# Patient Record
Sex: FEMALE | Race: WHITE | Employment: FULL TIME | URBAN - METROPOLITAN AREA
[De-identification: names, ages, dates, MRNs, and addresses within clinical notes are randomized per-mention and may not be internally consistent; named-entity substitution may affect disease eponyms.]

---

## 2017-01-17 ENCOUNTER — MYC MEDICAL ADVICE (OUTPATIENT)
Dept: FAMILY MEDICINE | Facility: CLINIC | Age: 47
End: 2017-01-17

## 2017-02-15 ENCOUNTER — OFFICE VISIT (OUTPATIENT)
Dept: FAMILY MEDICINE | Facility: CLINIC | Age: 47
End: 2017-02-15
Payer: COMMERCIAL

## 2017-02-15 VITALS
BODY MASS INDEX: 31.5 KG/M2 | HEIGHT: 66 IN | SYSTOLIC BLOOD PRESSURE: 122 MMHG | OXYGEN SATURATION: 98 % | DIASTOLIC BLOOD PRESSURE: 82 MMHG | HEART RATE: 96 BPM | TEMPERATURE: 98.6 F | WEIGHT: 196 LBS

## 2017-02-15 DIAGNOSIS — R52 BODY ACHES: ICD-10-CM

## 2017-02-15 DIAGNOSIS — R50.9 FEVER, UNSPECIFIED: Primary | ICD-10-CM

## 2017-02-15 DIAGNOSIS — R05.9 COUGH: ICD-10-CM

## 2017-02-15 DIAGNOSIS — R09.81 NASAL CONGESTION: ICD-10-CM

## 2017-02-15 LAB
FLUAV+FLUBV AG SPEC QL: NORMAL
FLUAV+FLUBV AG SPEC QL: NORMAL
SPECIMEN SOURCE: NORMAL

## 2017-02-15 PROCEDURE — 87804 INFLUENZA ASSAY W/OPTIC: CPT | Performed by: PHYSICIAN ASSISTANT

## 2017-02-15 PROCEDURE — 99213 OFFICE O/P EST LOW 20 MIN: CPT | Performed by: PHYSICIAN ASSISTANT

## 2017-02-15 NOTE — NURSING NOTE
Chief Complaint   Patient presents with     Otalgia     Generalized Body Aches     Fever     Cough    There were no vitals taken for this visit. Body Mass Index is There is no height or weight on file to calculate BMI.  BP completed using cuff size : large right arm  Natali Ortega MA

## 2017-02-15 NOTE — LETTER
[facility]  [address]  [facility] [address]  Phone: [phone]  Fax: [phone]    February 15, 2017        Milagro Frye  [address]   [facility] [address]          To whom it may concern:    RE: Milagro Frye    Patient was seen and treated today at our clinic. Please excuse from until afebrile for 24 hours not on fever reducers.    Please contact me for questions or concerns.      Sincerely,        Darline Anthony PA-C

## 2017-02-15 NOTE — MR AVS SNAPSHOT
After Visit Summary   2/15/2017    Milagro Frye    MRN: [id number]           Patient Information     Date Of Birth          1970        Visit Information        Provider Department      2/15/2017 3:40 PM Darline Anthony PA-C [facility]        Today's Diagnoses     Fever, unspecified    -  1    Body aches        Nasal congestion        Cough          Care Instructions    Neg for influenza.  Hx/exam still suggest this can be the case despite negative test.  Reviewed natural progression, potential complications, hygiene measures and public health risks.  Note provided for work.  Treatment is supportive.  Re-check if not improving as expected.    Electronically Signed By: Darline Anthony PA-C          Follow-ups after your visit        Who to contact     If you have questions or need follow up information about today's clinic visit or your schedule please contact [facility] directly at [phone].  Normal or non-critical lab and imaging results will be communicated to you by independenceIThart, letter or phone within 4 business days after the clinic has received the results. If you do not hear from us within 7 days, please contact the clinic through independenceIThart or phone. If you have a critical or abnormal lab result, we will notify you by phone as soon as possible.  Submit refill requests through "[employer]" or call your pharmacy and they will forward the refill request to us. Please allow 3 business days for your refill to be completed.          Additional Information About Your Visit        MyChart Information     "[employer]" gives you secure access to your electronic health record. If you see a primary care provider, you can also send messages to your care team and make appointments. If you have questions, please call your primary care clinic.  If you do not have a primary care provider, please call [phone] and they will assist you.        Care EveryWhere ID     This  "is your Care EveryWhere ID. This could be used by other organizations to access your [facility] medical records  [id number]        Your Vitals Were     Pulse Temperature Height Pulse Oximetry Breastfeeding? BMI (Body Mass Index)    96 98.6  F (37  C) (Oral) 5' 6\" (1.676 m) 98% No 31.64 kg/m2       Blood Pressure from Last 3 Encounters:   02/15/17 122/82   11/06/16 125/71   09/29/16 120/72    Weight from Last 3 Encounters:   02/15/17 196 lb (88.9 kg)   09/29/16 194 lb (88 kg)   04/06/16 199 lb (90.3 kg)              We Performed the Following     Influenza A/B antigen        Primary Care Provider    [facility]       No address on file        Thank you!     Thank you for choosing [facility]  for your care. Our goal is always to provide you with excellent care. Hearing back from our patients is one way we can continue to improve our services. Please take a few minutes to complete the written survey that you may receive in the mail after your visit with us. Thank you!             Your Updated Medication List - Protect others around you: Learn how to safely use, store and throw away your medicines at www.disposemymeds.org.          This list is accurate as of: 2/15/17  4:21 PM.  Always use your most recent med list.                   Brand Name Dispense Instructions for use    cetirizine 10 MG tablet    zyrTEC    30 tablet    Take 1 tablet (10 mg) by mouth every evening       DULoxetine 60 MG EC capsule    CYMBALTA    90 capsule    Take 1 capsule (60 mg) by mouth daily       GLUCOSAMINE SULFATE PO          HYDROcodone-acetaminophen 5-325 MG per tablet    NORCO    20 tablet    Take 1 tablet by mouth every 6 hours as needed for moderate to severe pain       meclizine 25 MG tablet    ANTIVERT    30 tablet    Take 1 tablet (25 mg) by mouth every 6 hours as needed for dizziness       nitroglycerin 0.1 MG/HR 24 hr patch    NITRODUR         ondansetron 8 MG ODT tab    ZOFRAN ODT    20 tablet    Take " 1 tablet (8 mg) by mouth every 8 hours as needed for nausea       tobramycin 0.3 % Oint ophthalmic ointment    TOBREX    3.5 g    Apply 1/2 inch ribbon of ointment       TYLENOL PO

## 2017-02-15 NOTE — PATIENT INSTRUCTIONS
Neg for influenza.  Hx/exam still suggest this can be the case despite negative test.  Reviewed natural progression, potential complications, hygiene measures and public health risks.  Note provided for work.  Treatment is supportive.  Re-check if not improving as expected.    Electronically Signed By: Darline Anthony PA-C

## 2017-02-15 NOTE — PROGRESS NOTES
SUBJECTIVE:                                                    Milagro Frye is a 46 year old female who presents to clinic today for the following health issues:      Acute Illness   Acute illness concerns: fever, headache, sore throat, cough.  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    Onset: started Sunday morning, but full blown awful by 3pm with body aches and respiratory sx.  Received flu vaccine this year.      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     Therapies Tried and outcome: tylenol and mucinex, taken last at 2    Problem list and histories reviewed & adjusted, as indicated.  Additional history: as documented  Problem list, Medication list, Allergies, and Medical/Social/Surgical histories reviewed in [facility] and updated as appropriate.    Current Outpatient Prescriptions   Medication     tobramycin (TOBREX) 0.3 % OINT ophthalmic ointment     DULoxetine (CYMBALTA) 60 MG capsule     meclizine (ANTIVERT) 25 MG tablet     ondansetron (ZOFRAN ODT) 8 MG disintegrating tablet     HYDROcodone-acetaminophen (NORCO) 5-325 MG per tablet     nitroglycerin (NITRODUR) 0.1 MG/HR     cetirizine (ZYRTEC) 10 MG tablet     GLUCOSAMINE SULFATE PO     Acetaminophen (TYLENOL PO)     No current facility-administered medications for this visit.        Allergies   Allergen Reactions     Sporanox [Imidazole Antifungals] Hives     Aspartame      Headaches and occasional rash     Bee Venom       "Coconut Oil      NOTE. Only allergic to meat of fruit.     Indomethacin      personality changes     Meperidine Hcl      Metronidazole      severe headaches     ROS:  Constitutional, HEENT, cardiovascular, pulmonary, gi and gu systems are negative, except as otherwise noted.    OBJECTIVE:                                                    /82 (BP Location: Right arm, Cuff Size: Adult Large)  Pulse 96  Temp 98.6  F (37  C) (Oral)  Ht 5' 6\" (1.676 m)  Wt 196 lb (88.9 kg)  SpO2 98%  Breastfeeding? No  BMI 31.64 kg/m2  Body mass index is 31.64 kg/(m^2).  GENERAL: healthy, alert and no distress  EYES: Eyes grossly normal to inspection, PERRL and conjunctivae and sclerae normal  HENT: ear canals and TM's normal, nose and mouth without ulcers or lesions  NECK: no adenopathy, no asymmetry, masses, or scars and thyroid normal to palpation  RESP: lungs clear to auscultation - no rales, rhonchi or wheezes  CV: regular rate and rhythm, normal S1 S2, no S3 or S4, no murmur, click or rub, no peripheral edema and peripheral pulses strong.    Diagnostic Test Results:  Results for orders placed or performed in visit on 02/15/17 (from the past 24 hour(s))   Influenza A/B antigen   Result Value Ref Range    Influenza A/B Agn Specimen Nasal     Influenza A  NEG     Negative   Test results must be correlated with clinical data. If necessary, results   should be confirmed by a molecular assay or viral culture.      Influenza B  NEG     Negative   Test results must be correlated with clinical data. If necessary, results   should be confirmed by a molecular assay or viral culture.          ASSESSMENT/PLAN:                                                        ICD-10-CM    1. Fever, unspecified R50.9 Influenza A/B antigen   2. Body aches R52    3. Nasal congestion R09.81    4. Cough R05    Outside treatment range for tamiflu.  See Patient Instructions  Patient Instructions   Neg for influenza.  Hx/exam still suggest this can be the " case despite negative test.  Reviewed natural progression, potential complications, hygiene measures and public health risks.  Note provided for work.  Treatment is supportive.  Re-check if not improving as expected.    Electronically Signed By: Darline Anthony PA-C

## 2017-06-01 ENCOUNTER — MYC MEDICAL ADVICE (OUTPATIENT)
Dept: FAMILY MEDICINE | Facility: CLINIC | Age: 47
End: 2017-06-01

## 2017-06-01 DIAGNOSIS — F33.1 MAJOR DEPRESSIVE DISORDER, RECURRENT EPISODE, MODERATE (H): ICD-10-CM

## 2017-06-01 DIAGNOSIS — F33.1 MAJOR DEPRESSIVE DISORDER, RECURRENT EPISODE, MODERATE (H): Primary | ICD-10-CM

## 2017-06-01 ASSESSMENT — ANXIETY QUESTIONNAIRES
4. TROUBLE RELAXING: SEVERAL DAYS
1. FEELING NERVOUS, ANXIOUS, OR ON EDGE: SEVERAL DAYS
GAD7 TOTAL SCORE: 8
3. WORRYING TOO MUCH ABOUT DIFFERENT THINGS: MORE THAN HALF THE DAYS
7. FEELING AFRAID AS IF SOMETHING AWFUL MIGHT HAPPEN: SEVERAL DAYS
5. BEING SO RESTLESS THAT IT IS HARD TO SIT STILL: NOT AT ALL
2. NOT BEING ABLE TO STOP OR CONTROL WORRYING: MORE THAN HALF THE DAYS
7. FEELING AFRAID AS IF SOMETHING AWFUL MIGHT HAPPEN: SEVERAL DAYS
6. BECOMING EASILY ANNOYED OR IRRITABLE: SEVERAL DAYS
GAD7 TOTAL SCORE: 0
GAD7 TOTAL SCORE: 0

## 2017-06-01 ASSESSMENT — PATIENT HEALTH QUESTIONNAIRE - PHQ9
10. IF YOU CHECKED OFF ANY PROBLEMS, HOW DIFFICULT HAVE THESE PROBLEMS MADE IT FOR YOU TO DO YOUR WORK, TAKE CARE OF THINGS AT HOME, OR GET ALONG WITH OTHER PEOPLE: SOMEWHAT DIFFICULT
SUM OF ALL RESPONSES TO PHQ QUESTIONS 1-9: 10
SUM OF ALL RESPONSES TO PHQ QUESTIONS 1-9: 10

## 2017-06-01 NOTE — TELEPHONE ENCOUNTER
DULoxetine (CYMBALTA) 60 MG capsule  Last Written Prescription Date: 10/15/2016  Last Fill Quantity: 90, # refills: 1  Last Office Visit with [facility] primary care provider:  2/15/2017        Last PHQ-9 score on record=   PHQ-9 SCORE 9/9/2016   Total Score -   Total Score 8       Due for a phq-9 and viraj 7   My chart message sent     Michelle Saez RN, BSN  [place]

## 2017-06-02 RX ORDER — DULOXETIN HYDROCHLORIDE 60 MG/1
CAPSULE, DELAYED RELEASE ORAL
Qty: 90 CAPSULE | Refills: 0 | Status: SHIPPED | OUTPATIENT
Start: 2017-06-02 | End: 2017-09-06

## 2017-06-02 RX ORDER — DULOXETIN HYDROCHLORIDE 30 MG/1
30 CAPSULE, DELAYED RELEASE ORAL DAILY
Qty: 30 CAPSULE | Refills: 1 | Status: SHIPPED | OUTPATIENT
Start: 2017-06-02 | End: 2017-09-06

## 2017-06-02 ASSESSMENT — PATIENT HEALTH QUESTIONNAIRE - PHQ9: SUM OF ALL RESPONSES TO PHQ QUESTIONS 1-9: 10

## 2017-06-02 NOTE — TELEPHONE ENCOUNTER
Please see refill request and my chart message below     Please advise     Thank you     Michelle Saez RN, BSN  [facility]

## 2017-06-02 NOTE — TELEPHONE ENCOUNTER
PHQ-9 SCORE 1/6/2016 9/9/2016 6/1/2017   Total Score - - -   Total Score MyChart - - 10 (Moderate depression)   Total Score 7 8 10     SELENA-7 SCORE 5/22/2015 1/6/2016 6/1/2017   Total Score 5 - -   Total Score - - 0 (minimal anxiety)   Total Score - 6 8       Please review and advise on refill     Thank you     Michelle Saez RN, BSN  [place] [facility]

## 2017-07-15 ENCOUNTER — OFFICE VISIT (OUTPATIENT)
Dept: URGENT CARE | Facility: URGENT CARE | Age: 47
End: 2017-07-15
Payer: OTHER MISCELLANEOUS

## 2017-07-15 VITALS
OXYGEN SATURATION: 97 % | HEART RATE: 77 BPM | BODY MASS INDEX: 31.64 KG/M2 | WEIGHT: 196 LBS | TEMPERATURE: 98 F | DIASTOLIC BLOOD PRESSURE: 70 MMHG | SYSTOLIC BLOOD PRESSURE: 110 MMHG

## 2017-07-15 DIAGNOSIS — M54.6 ACUTE MIDLINE THORACIC BACK PAIN: Primary | ICD-10-CM

## 2017-07-15 PROCEDURE — 99213 OFFICE O/P EST LOW 20 MIN: CPT | Performed by: NURSE PRACTITIONER

## 2017-07-15 RX ORDER — CYCLOBENZAPRINE HCL 10 MG
10 TABLET ORAL
Qty: 14 TABLET | Refills: 0 | Status: SHIPPED | OUTPATIENT
Start: 2017-07-15 | End: 2017-09-06

## 2017-07-15 NOTE — NURSING NOTE
"Milagro Frye is a 46 year old female.      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    Weight as of this encounter: 196 lb (88.9 kg).  Medication Reconciliation: complete      Questioned patient about current smoking habits.  Pt. has never smoked.      Tammie Varghese CMA      "

## 2017-07-15 NOTE — LETTER
[facility]  [address]  [facility] [address]  [phone]    July 15, 2017    Milagro Frye  [address]   [facility] [address]  [phone] (home) none (work)    : 1970      To Whom it may concern:    Milagro Frye was seen in Urgent care  today, July 15, 2017.  I expect her condition to improve over the next few days.  She may return to work next scheduled shift if she is feeling better.  Follow up with primary care provider as needed.     Sincerely,          Sulma HAMLIN

## 2017-07-15 NOTE — PROGRESS NOTES
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 She denies bowel and bladder problem no weakness or numbness of upper or lower  limbs. The pain is positional with lifting, without radiation down the skip or  legs. Prior history of back problems: recurrent self limited episodes of low back pain in the past.     OBJECTIVE:  Vital signs as noted above./70 (BP Location: Right arm, Cuff Size: Adult Large)  Pulse 77  Temp 98  F (36.7  C) (Oral)  Wt 196 lb (88.9 kg)  SpO2 97%  BMI 31.64 kg/m2  Patient appears well in no acute distress. There is mild paraspinal tenderness with palpation low thoracic spine  No antalgic gait noted.  Spine reveals no local tenderness or mass. Straight leg raise is negative at 90 degrees  bilaterally. DTR's, motor strength and sensation normal, including heel and toe gait.      ASSESSMENT:   Mid and low back pain vs spasm vs strain     PLAN:Rx Flexeril OTC Ibuprofen. No focal neurological finding on exam. For acute pain, rest, intermittent application of cold.  Proper lifting with avoidance of heavy lifting discussed. She is off the work for the remaining of weekend. Return  if these symptoms worsen f/u with primary care provider. Given RTW letter.

## 2017-07-15 NOTE — MR AVS SNAPSHOT
After Visit Summary   7/15/2017    Milagro Frye    MRN: [id number]           Patient Information     Date Of Birth          1970        Visit Information        Provider Department      7/15/2017 2:30 PM Sulma Henderson APRN CNP [facility]        Today's Diagnoses     Acute midline thoracic back pain    -  1       Follow-ups after your visit        Who to contact     If you have questions or need follow up information about today's clinic visit or your schedule please contact [facility] directly at [phone].  Normal or non-critical lab and imaging results will be communicated to you by Doc[employer], letter or phone within 4 business days after the clinic has received the results. If you do not hear from us within 7 days, please contact the clinic through [employer] or phone. If you have a critical or abnormal lab result, we will notify you by phone as soon as possible.  Submit refill requests through [employer] or call your pharmacy and they will forward the refill request to us. Please allow 3 business days for your refill to be completed.          Additional Information About Your Visit        MyChart Information     [employer] gives you secure access to your electronic health record. If you see a primary care provider, you can also send messages to your care team and make appointments. If you have questions, please call your primary care clinic.  If you do not have a primary care provider, please call [phone] and they will assist you.        Care EveryWhere ID     This is your Care EveryWhere ID. This could be used by other organizations to access your [facility] medical records  [id number]        Your Vitals Were     Pulse Temperature Pulse Oximetry BMI (Body Mass Index)          77 98  F (36.7  C) (Oral) 97% 31.64 kg/m2         Blood Pressure from Last 3 Encounters:   07/15/17 110/70   02/15/17 122/82   11/06/16 125/71    Weight from Last 3  Encounters:   07/15/17 196 lb (88.9 kg)   02/15/17 196 lb (88.9 kg)   09/29/16 194 lb (88 kg)              Today, you had the following     No orders found for display         Today's Medication Changes          These changes are accurate as of: 7/15/17  4:50 PM.  If you have any questions, ask your nurse or doctor.               Start taking these medicines.        Dose/Directions    cyclobenzaprine 10 MG tablet   Commonly known as:  FLEXERIL   Used for:  Acute midline thoracic back pain   Started by:  uSlma Henderson APRN CNP        Dose:  10 mg   Take 1 tablet (10 mg) by mouth nightly as needed for muscle spasms   Quantity:  14 tablet   Refills:  0            Where to get your medicines      These medications were sent to [facility] [address] [address] AT [facility] [address] [place] [address], [facility] [address]    Hours:  24-hours Phone:  [phone]     cyclobenzaprine 10 MG tablet                Primary Care Provider    [facility]       No address on file        Equal Access to Services     HARVEY VENEGAS AH: Hadii micah virgen hadasho Sojarett, waaxda luqadaha, qaybta kaalmada adeegyalauren, rj guillaume. So [facility] [phone].    ATENCIÓN: Si habla español, tiene a higgins disposición servicios gratuitos de asistencia lingüística. Llame al [phone].    We comply with applicable federal civil rights laws and [place] laws. We do not discriminate on the basis of race, color, national origin, age, disability sex, sexual orientation or gender identity.            Thank you!     Thank you for choosing [facility]  for your care. Our goal is always to provide you with excellent care. Hearing back from our patients is one way we can continue to improve our services. Please take a few minutes to complete the written survey that you may receive in the mail after your visit with us. Thank you!             Your Updated  Medication List - Protect others around you: Learn how to safely use, store and throw away your medicines at www.disposemymeds.org.          This list is accurate as of: 7/15/17  4:50 PM.  Always use your most recent med list.                   Brand Name Dispense Instructions for use Diagnosis    cetirizine 10 MG tablet    zyrTEC    30 tablet    Take 1 tablet (10 mg) by mouth every evening        cyclobenzaprine 10 MG tablet    FLEXERIL    14 tablet    Take 1 tablet (10 mg) by mouth nightly as needed for muscle spasms    Acute midline thoracic back pain       * DULoxetine 60 MG EC capsule    CYMBALTA    90 capsule    TAKE ONE CAPSULE BY MOUTH DAILY    Major depressive disorder, recurrent episode, moderate (H)       * DULoxetine 30 MG EC capsule    CYMBALTA    30 capsule    Take 1 capsule (30 mg) by mouth daily    Major depressive disorder, recurrent episode, moderate (H)       HYDROcodone-acetaminophen 5-325 MG per tablet    NORCO    20 tablet    Take 1 tablet by mouth every 6 hours as needed for moderate to severe pain    Flank pain       meclizine 25 MG tablet    ANTIVERT    30 tablet    Take 1 tablet (25 mg) by mouth every 6 hours as needed for dizziness    BPPV (benign paroxysmal positional vertigo), left       nitroGLYcerin 0.1 MG/HR 24 hr patch    NITRODUR          ondansetron 8 MG ODT tab    ZOFRAN ODT    20 tablet    Take 1 tablet (8 mg) by mouth every 8 hours as needed for nausea    BPPV (benign paroxysmal positional vertigo), left       TYLENOL PO           * Notice:  This list has 2 medication(s) that are the same as other medications prescribed for you. Read the directions carefully, and ask your doctor or other care provider to review them with you.

## 2017-07-18 ENCOUNTER — OFFICE VISIT (OUTPATIENT)
Dept: FAMILY MEDICINE | Facility: CLINIC | Age: 47
End: 2017-07-18
Payer: OTHER MISCELLANEOUS

## 2017-07-18 VITALS
HEART RATE: 96 BPM | HEIGHT: 66 IN | OXYGEN SATURATION: 99 % | SYSTOLIC BLOOD PRESSURE: 108 MMHG | DIASTOLIC BLOOD PRESSURE: 62 MMHG | BODY MASS INDEX: 31.57 KG/M2 | TEMPERATURE: 98 F | WEIGHT: 196.4 LBS

## 2017-07-18 DIAGNOSIS — M54.50 RIGHT-SIDED LOW BACK PAIN WITHOUT SCIATICA, UNSPECIFIED CHRONICITY: Primary | ICD-10-CM

## 2017-07-18 DIAGNOSIS — Z02.6 ENCOUNTER RELATED TO WORKER'S COMPENSATION CLAIM: ICD-10-CM

## 2017-07-18 PROCEDURE — 99213 OFFICE O/P EST LOW 20 MIN: CPT | Performed by: PHYSICIAN ASSISTANT

## 2017-07-18 RX ORDER — CYCLOBENZAPRINE HCL 10 MG
5-10 TABLET ORAL 3 TIMES DAILY PRN
Qty: 30 TABLET | Refills: 1 | Status: SHIPPED | OUTPATIENT
Start: 2017-07-18 | End: 2017-07-31

## 2017-07-18 NOTE — MR AVS SNAPSHOT
"              After Visit Summary   7/18/2017    Milagro Frye    MRN: [id number]           Patient Information     Date Of Birth          1970        Visit Information        Provider Department      7/18/2017 9:40 AM Bridgett Barry PA-C [facility]        Today's Diagnoses     Right-sided low back pain without sciatica, unspecified chronicity    -  1    Encounter related to worker's compensation claim           Follow-ups after your visit        Who to contact     If you have questions or need follow up information about today's clinic visit or your schedule please contact [facility] [place] directly at [phone].  Normal or non-critical lab and imaging results will be communicated to you by [employer], letter or phone within 4 business days after the clinic has received the results. If you do not hear from us within 7 days, please contact the clinic through [employer] or phone. If you have a critical or abnormal lab result, we will notify you by phone as soon as possible.  Submit refill requests through [email or web address] or call your pharmacy and they will forward the refill request to us. Please allow 3 business days for your refill to be completed.          Additional Information About Your Visit        MyChart Information     [email or web address] gives you secure access to your electronic health record. If you see a primary care provider, you can also send messages to your care team and make appointments. If you have questions, please call your primary care clinic.  If you do not have a primary care provider, please call [phone] and they will assist you.        Care EveryWhere ID     This is your Care EveryWhere ID. This could be used by other organizations to access your [place] medical records  [id number]        Your Vitals Were     Pulse Temperature Height Pulse Oximetry BMI (Body Mass Index)       96 98  F (36.7  C) (Oral) 5' 6\" (1.676 m) 99% 31.7 kg/m2        Blood Pressure from Last 3 " Encounters:   07/18/17 108/62   07/15/17 110/70   02/15/17 122/82    Weight from Last 3 Encounters:   07/18/17 196 lb 6.4 oz (89.1 kg)   07/15/17 196 lb (88.9 kg)   02/15/17 196 lb (88.9 kg)              Today, you had the following     No orders found for display         Today's Medication Changes          These changes are accurate as of: 7/18/17 12:54 PM.  If you have any questions, ask your nurse or doctor.               These medicines have changed or have updated prescriptions.        Dose/Directions    * cyclobenzaprine 10 MG tablet   Commonly known as:  FLEXERIL   This may have changed:  Another medication with the same name was added. Make sure you understand how and when to take each.   Used for:  Acute midline thoracic back pain        Dose:  10 mg   Take 1 tablet (10 mg) by mouth nightly as needed for muscle spasms   Quantity:  14 tablet   Refills:  0       * cyclobenzaprine 10 MG tablet   Commonly known as:  FLEXERIL   This may have changed:  You were already taking a medication with the same name, and this prescription was added. Make sure you understand how and when to take each.   Used for:  Right-sided low back pain without sciatica, unspecified chronicity        Dose:  5-10 mg   Take 0.5-1 tablets (5-10 mg) by mouth 3 times daily as needed for muscle spasms   Quantity:  30 tablet   Refills:  1       * Notice:  This list has 2 medication(s) that are the same as other medications prescribed for you. Read the directions carefully, and ask your doctor or other care provider to review them with you.         Where to get your medicines      These medications were sent to [facility] [id number]  [place], [place] - [address], [facility] [place], [place] [address]    Hours:  24-hours Phone:  [phone]     cyclobenzaprine 10 MG tablet                Primary Care Provider    [facility]       No address on file        Equal Access to Services      [facility]: Hadii aad ku rico Sogerardali, waaxda luqadaha, qaybta kaalmada adeegrg, rj mariein hayaaashlie ramoswilma moses matteo . So [facility] [phone].    ATENCIÓN: Si habla abilio, tiene a higgins disposición servicios gratuitos de asistencia lingüística. Llame al [phone].    We comply with applicable federal civil rights laws and [place] laws. We do not discriminate on the basis of race, color, national origin, age, disability sex, sexual orientation or gender identity.            Thank you!     Thank you for choosing [facility]  for your care. Our goal is always to provide you with excellent care. Hearing back from our patients is one way we can continue to improve our services. Please take a few minutes to complete the written survey that you may receive in the mail after your visit with us. Thank you!             Your Updated Medication List - Protect others around you: Learn how to safely use, store and throw away your medicines at www.disposemymeds.org.          This list is accurate as of: 7/18/17 12:54 PM.  Always use your most recent med list.                   Brand Name Dispense Instructions for use Diagnosis    cetirizine 10 MG tablet    zyrTEC    30 tablet    Take 1 tablet (10 mg) by mouth every evening        * cyclobenzaprine 10 MG tablet    FLEXERIL    14 tablet    Take 1 tablet (10 mg) by mouth nightly as needed for muscle spasms    Acute midline thoracic back pain       * cyclobenzaprine 10 MG tablet    FLEXERIL    30 tablet    Take 0.5-1 tablets (5-10 mg) by mouth 3 times daily as needed for muscle spasms    Right-sided low back pain without sciatica, unspecified chronicity       * DULoxetine 60 MG EC capsule    CYMBALTA    90 capsule    TAKE ONE CAPSULE BY MOUTH DAILY    Major depressive disorder, recurrent episode, moderate (H)       * DULoxetine 30 MG EC capsule    CYMBALTA    30 capsule    Take 1 capsule (30 mg) by mouth daily    Major depressive disorder, recurrent  episode, moderate (H)       HYDROcodone-acetaminophen 5-325 MG per tablet    NORCO    20 tablet    Take 1 tablet by mouth every 6 hours as needed for moderate to severe pain    Flank pain       meclizine 25 MG tablet    ANTIVERT    30 tablet    Take 1 tablet (25 mg) by mouth every 6 hours as needed for dizziness    BPPV (benign paroxysmal positional vertigo), left       nitroGLYcerin 0.1 MG/HR 24 hr patch    NITRODUR          ondansetron 8 MG ODT tab    ZOFRAN ODT    20 tablet    Take 1 tablet (8 mg) by mouth every 8 hours as needed for nausea    BPPV (benign paroxysmal positional vertigo), left       TYLENOL PO           * Notice:  This list has 4 medication(s) that are the same as other medications prescribed for you. Read the directions carefully, and ask your doctor or other care provider to review them with you.

## 2017-07-18 NOTE — LETTER
[facility]  [address]  [facility] [address] [phone]    REPORT OF WORK ABILITY    NOTE TO EMPLOYEE: You must promptly provide a copy of this report to your  employer or worker's compensation insurer, and Qualified Rehabilitation Consultant.    Date: 7/18/2017                     Employee Name: Milagro Frye         YOB: 1970  Medical Record Number: [id number]   Soc.Sec.No: [id number]  Employer: [employer]                 Date of Injury: 7/15/17    Diagnosis: Acute midline thoracic back pain  Work Related: yes    Permanent Partial Disability(PPD) likely: NO    EMPLOYEE IS ABLE TO WORK: with restrictions from 7/19/17 to 7/23/17 -  Full shift     RESTRICTIONS IF ANY:   Stand: Occasionally (2-4 hours)   Sit: Occasionally (2-4 hours)   Walk: Occasionally (2-4 hours)   Kneel/[place]: Not at all (0 hours)      Lift, carry no more than:  10 - 20 lb. Occasionally (2-4 hours)   May use hands repetitively for:    Simple grasping: yes      Pushing/pulling: yes    Fine manipulation:  yes    Firm grasping:  yes     Operate Foot controls:  Right foot: not applicable   Left foot: not applicable     Bend:  Not at all (0 hours)     Stoop: Not at all (0 hours)    Twist: Not at all (0 hours)     Reach Above Shoulders: Occasionally (2-4 hours)    OTHER RESTRICTIONS: None    TREATMENT PLAN/NOTES: change work position for comfort.    If work restrictions cannot be accommodated, patient is unable to work.    Bridgett Barry PA-C
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
## 2017-07-21 ENCOUNTER — OFFICE VISIT (OUTPATIENT)
Dept: FAMILY MEDICINE | Facility: CLINIC | Age: 47
End: 2017-07-21
Payer: COMMERCIAL

## 2017-07-21 ENCOUNTER — TELEPHONE (OUTPATIENT)
Dept: FAMILY MEDICINE | Facility: CLINIC | Age: 47
End: 2017-07-21

## 2017-07-21 VITALS
SYSTOLIC BLOOD PRESSURE: 122 MMHG | TEMPERATURE: 98.4 F | WEIGHT: 195.8 LBS | OXYGEN SATURATION: 97 % | HEART RATE: 103 BPM | BODY MASS INDEX: 31.47 KG/M2 | DIASTOLIC BLOOD PRESSURE: 72 MMHG | HEIGHT: 66 IN

## 2017-07-21 DIAGNOSIS — M54.50 ACUTE LEFT-SIDED LOW BACK PAIN WITHOUT SCIATICA: Primary | ICD-10-CM

## 2017-07-21 PROCEDURE — 99213 OFFICE O/P EST LOW 20 MIN: CPT | Performed by: PHYSICIAN ASSISTANT

## 2017-07-21 RX ORDER — METHYLPREDNISOLONE 4 MG
TABLET, DOSE PACK ORAL
Qty: 21 TABLET | Refills: 0 | Status: SHIPPED | OUTPATIENT
Start: 2017-07-21 | End: 2017-09-06

## 2017-07-21 NOTE — TELEPHONE ENCOUNTER
Please contact patient's pharmacy regarding Flexeril Rx sent on 7/18/17. I changed patient's dose to every 8 hours PRN at office visit on 7/18/17, so she is out of her initial Flexeril Rx. She should be allowed to fill new Flexeril Rx which was sent on 7/18/17.    Bridgett Barry PA-C

## 2017-07-21 NOTE — NURSING NOTE
"Chief Complaint   Patient presents with     Work Comp       Initial /72 (BP Location: Right arm, Patient Position: Sitting, Cuff Size: Adult Regular)  Pulse 103  Temp 98.4  F (36.9  C) (Oral)  Ht 5' 6\" (1.676 m)  Wt 195 lb 12.8 oz (88.8 kg)  SpO2 97%  BMI 31.6 kg/m2 Estimated body mass index is 31.6 kg/(m^2) as calculated from the following:    Height as of this encounter: 5' 6\" (1.676 m).    Weight as of this encounter: 195 lb 12.8 oz (88.8 kg).  Medication Reconciliation: complete   Patsy Lin CMA    "

## 2017-07-21 NOTE — MR AVS SNAPSHOT
After Visit Summary   7/21/2017    Milagro Frye    MRN: [id number]           Patient Information     Date Of Birth          1970        Visit Information        Provider Department      7/21/2017 10:40 AM Bridgett Barry PA-C [facility]        Today's Diagnoses     Right-sided low back pain without sciatica, unspecified chronicity    -  1       Follow-ups after your visit        Additional Services     [facility] PT, HAND, AND CHIROPRACTIC REFERRAL       **This order will print in the [facility] Scheduling Office**    Physical Therapy, Hand Therapy and Chiropractic Care are available through:    *[facility]  *[facility]  *[facility]    Call one number to schedule at any of the above locations: [phone].    Your provider has referred you to: Physical Therapy at [facility] or [facility]    Indication/Reason for Referral: Low Back Pain and Thoracic Pain  Onset of Illness: 7/15/17  Therapy Orders: Evaluate and Treat  Special Programs: None  Special Request: None    Mario Martin      Additional Comments for the Therapist or Chiropractor: Work comp    Please be aware that coverage of these services is subject to the terms and limitations of your health insurance plan.  Call member services at your health plan with any benefit or coverage questions.      Please bring the following to your appointment:    *Your personal calendar for scheduling future appointments  *Comfortable clothing                  Who to contact     If you have questions or need follow up information about today's clinic visit or your schedule please contact [facility] directly at [phone].  Normal or non-critical lab and imaging results will be communicated to you by MyChart, letter or phone within 4 business days after the clinic has received the results. If you do not hear from us within 7 days, please contact the clinic through MyChart or phone. If you  "have a critical or abnormal lab result, we will notify you by phone as soon as possible.  Submit refill requests through [employer] or call your pharmacy and they will forward the refill request to us. Please allow 3 business days for your refill to be completed.          Additional Information About Your Visit        ZAINA PHARMA[employer] Information     [employer] gives you secure access to your electronic health record. If you see a primary care provider, you can also send messages to your care team and make appointments. If you have questions, please call your primary care clinic.  If you do not have a primary care provider, please call [phone] and they will assist you.        Care EveryWhere ID     This is your Care EveryWhere ID. This could be used by other organizations to access your [facility] medical records  [id number]        Your Vitals Were     Pulse Temperature Height Pulse Oximetry BMI (Body Mass Index)       103 98.4  F (36.9  C) (Oral) 5' 6\" (1.676 m) 97% 31.6 kg/m2        Blood Pressure from Last 3 Encounters:   07/21/17 122/72   07/18/17 108/62   07/15/17 110/70    Weight from Last 3 Encounters:   07/21/17 195 lb 12.8 oz (88.8 kg)   07/18/17 196 lb 6.4 oz (89.1 kg)   07/15/17 196 lb (88.9 kg)              We Performed the Following     [facility] PT, HAND, AND CHIROPRACTIC REFERRAL          Today's Medication Changes          These changes are accurate as of: 7/21/17 11:25 AM.  If you have any questions, ask your nurse or doctor.               Start taking these medicines.        Dose/Directions    methylPREDNISolone 4 MG tablet   Commonly known as:  MEDROL DOSEPAK   Used for:  Right-sided low back pain without sciatica, unspecified chronicity        Follow package instructions   Quantity:  21 tablet   Refills:  0            Where to get your medicines      These medications were sent to [facility] [address] [address] AT [facility] [address] [address], [facility] " [id number]    Hours:  24-hours Phone:  [phone]     methylPREDNISolone 4 MG tablet                Primary Care Provider    [facility]       No address on file        Equal Access to Services     HARVEY VENEGAS : Ivan virgen rico Dumont, warodrickda luqaurelia, kelleta kachacorta hudson, rj moses laMamtaute guillaume. So [facility] [phone].    ATENCIÓN: Si habla español, tiene a higgins disposición servicios gratuitos de asistencia lingüística. Llame al [phone].    We comply with applicable federal civil rights laws and [place] laws. We do not discriminate on the basis of race, color, national origin, age, disability sex, sexual orientation or gender identity.            Thank you!     Thank you for choosing [facility]  for your care. Our goal is always to provide you with excellent care. Hearing back from our patients is one way we can continue to improve our services. Please take a few minutes to complete the written survey that you may receive in the mail after your visit with us. Thank you!             Your Updated Medication List - Protect others around you: Learn how to safely use, store and throw away your medicines at www.disposemymeds.org.          This list is accurate as of: 7/21/17 11:25 AM.  Always use your most recent med list.                   Brand Name Dispense Instructions for use Diagnosis    cetirizine 10 MG tablet    zyrTEC    30 tablet    Take 1 tablet (10 mg) by mouth every evening        * cyclobenzaprine 10 MG tablet    FLEXERIL    14 tablet    Take 1 tablet (10 mg) by mouth nightly as needed for muscle spasms    Acute midline thoracic back pain       * cyclobenzaprine 10 MG tablet    FLEXERIL    30 tablet    Take 0.5-1 tablets (5-10 mg) by mouth 3 times daily as needed for muscle spasms    Right-sided low back pain without sciatica, unspecified chronicity       * DULoxetine 60 MG EC capsule    CYMBALTA    90 capsule    TAKE ONE CAPSULE BY MOUTH  DAILY    Major depressive disorder, recurrent episode, moderate (H)       * DULoxetine 30 MG EC capsule    CYMBALTA    30 capsule    Take 1 capsule (30 mg) by mouth daily    Major depressive disorder, recurrent episode, moderate (H)       HYDROcodone-acetaminophen 5-325 MG per tablet    NORCO    20 tablet    Take 1 tablet by mouth every 6 hours as needed for moderate to severe pain    Flank pain       meclizine 25 MG tablet    ANTIVERT    30 tablet    Take 1 tablet (25 mg) by mouth every 6 hours as needed for dizziness    BPPV (benign paroxysmal positional vertigo), left       methylPREDNISolone 4 MG tablet    MEDROL DOSEPAK    21 tablet    Follow package instructions    Right-sided low back pain without sciatica, unspecified chronicity       nitroGLYcerin 0.1 MG/HR 24 hr patch    NITRODUR          ondansetron 8 MG ODT tab    ZOFRAN ODT    20 tablet    Take 1 tablet (8 mg) by mouth every 8 hours as needed for nausea    BPPV (benign paroxysmal positional vertigo), left       TYLENOL PO           * Notice:  This list has 4 medication(s) that are the same as other medications prescribed for you. Read the directions carefully, and ask your doctor or other care provider to review them with you.

## 2017-07-21 NOTE — LETTER
[facility]  [address]  [facility] [address] [phone]    REPORT OF WORK ABILITY    NOTE TO EMPLOYEE: You must promptly provide a copy of this report to your  employer or worker's compensation insurer, and Qualified Rehabilitation Consultant.    Date: 7/21/2017                     Employee Name: Milagro Frye         YOB: 1970  Medical Record Number: [id number]   Soc.Sec.No: [id number]  Employer: [employer]                 Date of Injury: 7/15/17    Diagnosis: Acute midline thoracic back pain  Work Related: yes    Permanent Partial Disability(PPD) likely: NO    EMPLOYEE IS ABLE TO WORK: with restrictions from 7/21/17 to 7/28/17 -  Full shift     RESTRICTIONS IF ANY:   Stand: Occasionally (2-4 hours)   Sit: Occasionally (2-4 hours)   Walk: Occasionally (2-4 hours)   Kneel/Foss: Not at all (0 hours)      Lift, carry no more than:  10 - 20 lb. Occasionally (2-4 hours)   May use hands repetitively for:    Simple grasping: yes      Pushing/pulling: yes    Fine manipulation:  yes    Firm grasping:  yes     Operate Foot controls:  Right foot: not applicable   Left foot: not applicable     Bend:  Not at all (0 hours)     Stoop: Not at all (0 hours)    Twist: Not at all (0 hours)     Reach Above Shoulders: Occasionally (2-4 hours)    OTHER RESTRICTIONS: None    TREATMENT PLAN/NOTES: change work position for comfort.    If work restrictions cannot be accommodated, patient is unable to work.    Bridgett Barry PA-C

## 2017-07-22 ENCOUNTER — HEALTH MAINTENANCE LETTER (OUTPATIENT)
Age: 47
End: 2017-07-22

## 2017-07-27 ENCOUNTER — MYC MEDICAL ADVICE (OUTPATIENT)
Dept: FAMILY MEDICINE | Facility: CLINIC | Age: 47
End: 2017-07-27

## 2017-07-31 ENCOUNTER — THERAPY VISIT (OUTPATIENT)
Dept: PHYSICAL THERAPY | Facility: CLINIC | Age: 47
End: 2017-07-31
Payer: OTHER MISCELLANEOUS

## 2017-07-31 ENCOUNTER — OFFICE VISIT (OUTPATIENT)
Dept: FAMILY MEDICINE | Facility: CLINIC | Age: 47
End: 2017-07-31
Payer: COMMERCIAL

## 2017-07-31 VITALS
WEIGHT: 198.7 LBS | SYSTOLIC BLOOD PRESSURE: 118 MMHG | BODY MASS INDEX: 31.93 KG/M2 | DIASTOLIC BLOOD PRESSURE: 76 MMHG | HEIGHT: 66 IN | HEART RATE: 104 BPM | OXYGEN SATURATION: 98 % | TEMPERATURE: 98.7 F

## 2017-07-31 DIAGNOSIS — M54.50 RIGHT-SIDED LOW BACK PAIN WITHOUT SCIATICA, UNSPECIFIED CHRONICITY: Primary | ICD-10-CM

## 2017-07-31 DIAGNOSIS — Z02.6 ENCOUNTER RELATED TO WORKER'S COMPENSATION CLAIM: ICD-10-CM

## 2017-07-31 DIAGNOSIS — M54.9 BACK PAIN: Primary | ICD-10-CM

## 2017-07-31 PROCEDURE — 97140 MANUAL THERAPY 1/> REGIONS: CPT | Mod: GP | Performed by: PHYSICAL THERAPIST

## 2017-07-31 PROCEDURE — 97161 PT EVAL LOW COMPLEX 20 MIN: CPT | Mod: GP | Performed by: PHYSICAL THERAPIST

## 2017-07-31 PROCEDURE — 97035 APP MDLTY 1+ULTRASOUND EA 15: CPT | Mod: GP | Performed by: PHYSICAL THERAPIST

## 2017-07-31 PROCEDURE — 99213 OFFICE O/P EST LOW 20 MIN: CPT | Performed by: PHYSICIAN ASSISTANT

## 2017-07-31 PROCEDURE — 97110 THERAPEUTIC EXERCISES: CPT | Mod: GP | Performed by: PHYSICAL THERAPIST

## 2017-07-31 RX ORDER — CYCLOBENZAPRINE HCL 10 MG
5-10 TABLET ORAL 3 TIMES DAILY PRN
Qty: 30 TABLET | Refills: 1 | Status: SHIPPED | OUTPATIENT
Start: 2017-07-31 | End: 2017-08-24

## 2017-07-31 NOTE — MR AVS SNAPSHOT
"              After Visit Summary   7/31/2017    Milagro Frye    MRN: [id number]           Patient Information     Date Of Birth          1970        Visit Information        Provider Department      7/31/2017 4:40 PM Bridgett Barry PA-C [facility]        Today's Diagnoses     Right-sided low back pain without sciatica, unspecified chronicity    -  1    Encounter related to worker's compensation claim           Follow-ups after your visit        Who to contact     If you have questions or need follow up information about today's clinic visit or your schedule please contact [facility] directly at [phone].  Normal or non-critical lab and imaging results will be communicated to you by [employer], letter or phone within 4 business days after the clinic has received the results. If you do not hear from us within 7 days, please contact the clinic through [employer] or phone. If you have a critical or abnormal lab result, we will notify you by phone as soon as possible.  Submit refill requests through [employer] or call your pharmacy and they will forward the refill request to us. Please allow 3 business days for your refill to be completed.          Additional Information About Your Visit        MyChart Information     [employer] gives you secure access to your electronic health record. If you see a primary care provider, you can also send messages to your care team and make appointments. If you have questions, please call your primary care clinic.  If you do not have a primary care provider, please call [phone] and they will assist you.        Care EveryWhere ID     This is your Care EveryWhere ID. This could be used by other organizations to access your [facility] medical records  [id number]        Your Vitals Were     Pulse Temperature Height Pulse Oximetry BMI (Body Mass Index)       104 98.7  F (37.1  C) (Oral) 5' 6\" (1.676 m) 98% 32.07 kg/m2        Blood Pressure from Last 3 " Encounters:   07/31/17 118/76   07/21/17 122/72   07/18/17 108/62    Weight from Last 3 Encounters:   07/31/17 198 lb 11.2 oz (90.1 kg)   07/21/17 195 lb 12.8 oz (88.8 kg)   07/18/17 196 lb 6.4 oz (89.1 kg)              Today, you had the following     No orders found for display         Where to get your medicines      These medications were sent to [facility] [address] [facility] [address] [place] [address] [address] AT [facility] [address] [address], [facility] [address]    Hours:  24-hours Phone:  [phone]     cyclobenzaprine 10 MG tablet          Primary Care Provider    [facility]       No address on file        Equal Access to Services     HARVEY VENEGAS AH: Ivan Dumont, waaxda luqadaha, qaybta kaalmada becca, rj guillaume. So [facility] [phone].    ATENCIÓN: Si habla español, tiene a higgins disposición servicios gratuitos de asistencia lingüística. Maggie al [phone].    We comply with applicable federal civil rights laws and [place] laws. We do not discriminate on the basis of race, color, national origin, age, disability sex, sexual orientation or gender identity.            Thank you!     Thank you for choosing [facility]  for your care. Our goal is always to provide you with excellent care. Hearing back from our patients is one way we can continue to improve our services. Please take a few minutes to complete the written survey that you may receive in the mail after your visit with us. Thank you!             Your Updated Medication List - Protect others around you: Learn how to safely use, store and throw away your medicines at www.disposemymeds.org.          This list is accurate as of: 7/31/17  7:28 PM.  Always use your most recent med list.                   Brand Name Dispense Instructions for use Diagnosis    cetirizine 10 MG tablet    zyrTEC    30 tablet    Take 1 tablet (10 mg) by mouth every evening         * cyclobenzaprine 10 MG tablet    FLEXERIL    14 tablet    Take 1 tablet (10 mg) by mouth nightly as needed for muscle spasms    Acute midline thoracic back pain       * cyclobenzaprine 10 MG tablet    FLEXERIL    30 tablet    Take 0.5-1 tablets (5-10 mg) by mouth 3 times daily as needed for muscle spasms    Right-sided low back pain without sciatica, unspecified chronicity       * DULoxetine 60 MG EC capsule    CYMBALTA    90 capsule    TAKE ONE CAPSULE BY MOUTH DAILY    Major depressive disorder, recurrent episode, moderate (H)       * DULoxetine 30 MG EC capsule    CYMBALTA    30 capsule    Take 1 capsule (30 mg) by mouth daily    Major depressive disorder, recurrent episode, moderate (H)       HYDROcodone-acetaminophen 5-325 MG per tablet    NORCO    20 tablet    Take 1 tablet by mouth every 6 hours as needed for moderate to severe pain    Flank pain       meclizine 25 MG tablet    ANTIVERT    30 tablet    Take 1 tablet (25 mg) by mouth every 6 hours as needed for dizziness    BPPV (benign paroxysmal positional vertigo), left       methylPREDNISolone 4 MG tablet    MEDROL DOSEPAK    21 tablet    Follow package instructions    Acute left-sided low back pain without sciatica       nitroGLYcerin 0.1 MG/HR 24 hr patch    NITRODUR          ondansetron 8 MG ODT tab    ZOFRAN ODT    20 tablet    Take 1 tablet (8 mg) by mouth every 8 hours as needed for nausea    BPPV (benign paroxysmal positional vertigo), left       TYLENOL PO           * Notice:  This list has 4 medication(s) that are the same as other medications prescribed for you. Read the directions carefully, and ask your doctor or other care provider to review them with you.

## 2017-07-31 NOTE — LETTER
[facility]  [address]  [facility] [address] [phone]    REPORT OF WORK ABILITY    NOTE TO EMPLOYEE: You must promptly provide a copy of this report to your  employer or worker's compensation insurer, and Qualified Rehabilitation Consultant.    Date: 7/31/2017                     Employee Name: Milagro Frye         YOB: 1970  Medical Record Number: [id number]   Soc.Sec.No: [id number]  Employer: [employer]                 Date of Injury: 7/15/17    Diagnosis: Acute midline thoracic back pain  Work Related: yes    Permanent Partial Disability(PPD) likely: NO    EMPLOYEE IS ABLE TO WORK: with restrictions from 7/28/17 to 8/7/17 -  Full shift--light duty     RESTRICTIONS IF ANY:   Stand: Occasionally (2-4 hours)   Sit: Occasionally (2-4 hours)   Walk: Occasionally (2-4 hours)   Kneel/[place]: Not at all (0 hours)      Lift, carry no more than:  10 - 20 lb. Occasionally (2-4 hours)   May use hands repetitively for:    Simple grasping: yes      Pushing/pulling: yes    Fine manipulation:  yes    Firm grasping:  yes     Operate Foot controls:  Right foot: not applicable   Left foot: not applicable     Bend:  Not at all (0 hours)     Stoop: Not at all (0 hours)    Twist: Not at all (0 hours)     Reach Above Shoulders: Occasionally (2-4 hours)    OTHER RESTRICTIONS: None    TREATMENT PLAN/NOTES: change work position for comfort.    If work restrictions cannot be accommodated, patient is unable to work.    Bridgett Barry PA-C

## 2017-07-31 NOTE — NURSING NOTE
"Chief Complaint   Patient presents with     Work Comp       Initial /76 (BP Location: Right arm, Patient Position: Sitting, Cuff Size: Adult Regular)  Pulse 104  Temp 98.7  F (37.1  C) (Oral)  Ht 5' 6\" (1.676 m)  Wt 198 lb 11.2 oz (90.1 kg)  SpO2 98%  BMI 32.07 kg/m2 Estimated body mass index is 32.07 kg/(m^2) as calculated from the following:    Height as of this encounter: 5' 6\" (1.676 m).    Weight as of this encounter: 198 lb 11.2 oz (90.1 kg).  Medication Reconciliation: complete   Patsy Lin MA  "
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
## 2017-07-31 NOTE — LETTER
My Depression Action Plan  Name: Milagro Frye   Date of Birth 1970  Date: 7/31/2017    My doctor: [facility] [place] [facility]   My clinic: [facility] [place]  [address]  [place] [address] [phone]          GREEN    ZONE   Good Control    What it looks like:     Things are going generally well. You have normal up s and down s. You may even feel depressed from time to time, but bad moods usually last less than a day.   What you need to do:  1. Continue to care for yourself (see self care plan)  2. Check your depression survival kit and update it as needed  3. Follow your physician s recommendations including any medication.  4. Do not stop taking medication unless you consult with your physician first.           YELLOW         ZONE Getting Worse    What it looks like:     Depression is starting to interfere with your life.     It may be hard to get out of bed; you may be starting to isolate yourself from others.    Symptoms of depression are starting to last most all day and this has happened for several days.     You may have suicidal thoughts but they are not constant.   What you need to do:     1. Call your care team, your response to treatment will improve if you keep your care team informed of your progress. Yellow periods are signs an adjustment may need to be made.     2. Continue your self-care, even if you have to fake it!    3. Talk to someone in your support network    4. Open up your depression survival kit           RED    ZONE Medical Alert - Get Help    What it looks like:     Depression is seriously interfering with your life.     You may experience these or other symptoms: You can t get out of bed most days, can t work or engage in other necessary activities, you have trouble taking care of basic hygiene, or basic responsibilities, thoughts of suicide or death that will not go away, self-injurious behavior.     What you need to do:  1. Call your care team and  request a same-day appointment. If they are not available (weekends or after hours) call your local crisis line, emergency room or 911.      Electronically signed by: Patsy Lni, July 31, 2017    Depression Self Care Plan / Survival Kit    Self-Care for Depression  Here s the deal. Your body and mind are really not as separate as most people think.  What you do and think affects how you feel and how you feel influences what you do and think. This means if you do things that people who feel good do, it will help you feel better.  Sometimes this is all it takes.  There is also a place for medication and therapy depending on how severe your depression is, so be sure to consult with your medical provider and/ or Behavioral Health Consultant if your symptoms are worsening or not improving.     In order to better manage my stress, I will:    Exercise  Get some form of exercise, every day. This will help reduce pain and release endorphins, the  feel good  chemicals in your brain. This is almost as good as taking antidepressants!  This is not the same as joining a gym and then never going! (they count on that by the way ) It can be as simple as just going for a walk or doing some gardening, anything that will get you moving.      Hygiene   Maintain good hygiene (Get out of bed in the morning, Make your bed, Brush your teeth, Take a shower, and Get dressed like you were going to work, even if you are unemployed).  If your clothes don't fit try to get ones that do.    Diet  I will strive to eat foods that are good for me, drink plenty of water, and avoid excessive sugar, caffeine, alcohol, and other mood-altering substances.  Some foods that are helpful in depression are: complex carbohydrates, B vitamins, flaxseed, fish or fish oil, fresh fruits and vegetables.    Psychotherapy  I agree to participate in Individual Therapy (if recommended).    Medication  If prescribed medications, I agree to take them.  Missing doses  can result in serious side effects.  I understand that drinking alcohol, or other illicit drug use, may cause potential side effects.  I will not stop my medication abruptly without first discussing it with my provider.    Staying Connected With Others  I will stay in touch with my friends, family members, and my primary care provider/team.    Use your imagination  Be creative.  We all have a creative side; it doesn t matter if it s oil painting, sand castles, or mud pies! This will also kick up the endorphins.    Witness Beauty  (AKA stop and smell the roses) Take a look outside, even in mid-winter. Notice colors, textures. Watch the squirrels and birds.     Service to others  Be of service to others.  There is always someone else in need.  By helping others we can  get out of ourselves  and remember the really important things.  This also provides opportunities for practicing all the other parts of the program.    Humor  Laugh and be silly!  Adjust your TV habits for less news and crime-drama and more comedy.    Control your stress  Try breathing deep, massage therapy, biofeedback, and meditation. Find time to relax each day.     My support system    Clinic Contact:  Phone number:    Contact 1:  Phone number:    Contact 2:  Phone number:    Sabianist/:  Phone number:    Therapist:  Phone number:    Local crisis center:    Phone number:    Other community support:  Phone number:

## 2017-07-31 NOTE — MR AVS SNAPSHOT
After Visit Summary   7/31/2017    Milagro Frye    MRN: [id number]           Patient Information     Date Of Birth          1970        Visit Information        Provider Department      7/31/2017 8:10 AM Niesha Acosta PT [facility]        Today's Diagnoses     Back pain    -  1       Follow-ups after your visit        Your next 10 appointments already scheduled     Jul 31, 2017  4:40 PM CDT   Office Visit with Bridgett Barry PA-C   [facility]    [address] [facility] [address] [phone]           Bring a current list of meds and any records pertaining to this visit. For Physicals, please bring immunization records and any forms needing to be filled out. Please arrive 10 minutes early to complete paperwork.              Who to contact     If you have questions or need follow up information about today's clinic visit or your schedule please contact [facility] [place] directly at [phone].  Normal or non-critical lab and imaging results will be communicated to you by CureTechhart, letter or phone within 4 business days after the clinic has received the results. If you do not hear from us within 7 days, please contact the clinic through Tapad or phone. If you have a critical or abnormal lab result, we will notify you by phone as soon as possible.  Submit refill requests through Tapad or call your pharmacy and they will forward the refill request to us. Please allow 3 business days for your refill to be completed.          Additional Information About Your Visit        CureTechharDhir [employer]s Information     Tapad gives you secure access to your electronic health record. If you see a primary care provider, you can also send messages to your care team and make appointments. If you have questions, please call your primary care clinic.  If you do not have a primary care provider, please call [phone] and  they will assist you.        Care EveryWhere ID     This is your Care EveryWhere ID. This could be used by other organizations to access your [facility] medical records  [id number]         Blood Pressure from Last 3 Encounters:   07/21/17 122/72   07/18/17 108/62   07/15/17 110/70    Weight from Last 3 Encounters:   07/21/17 88.8 kg (195 lb 12.8 oz)   07/18/17 89.1 kg (196 lb 6.4 oz)   07/15/17 88.9 kg (196 lb)              We Performed the Following     Manual Ther Tech, 1+Regions, EA 15 min     PT Eval, Low Complexity (57037)     Therapeutic Exercises     Ultrasound Therapy        Primary Care Provider    [facility]       No address on file        Equal Access to Services     HARVEY VENEGAS : Hadii micah pandeyo Sogerardali, waaxda luqadaha, qaybta kaalmada adeegyada, rj felipe . So [facility] [phone].    ATENCIÓN: Si habla español, tiene a higgins disposición servicios gratuitos de asistencia lingüística. Llame al [phone].    We comply with applicable federal civil rights laws and [place] laws. We do not discriminate on the basis of race, color, national origin, age, disability sex, sexual orientation or gender identity.            Thank you!     Thank you for choosing [facility] [place]  for your care. Our goal is always to provide you with excellent care. Hearing back from our patients is one way we can continue to improve our services. Please take a few minutes to complete the written survey that you may receive in the mail after your visit with us. Thank you!             Your Updated Medication List - Protect others around you: Learn how to safely use, store and throw away your medicines at www.disposemymeds.org.          This list is accurate as of: 7/31/17 10:13 AM.  Always use your most recent med list.                   Brand Name Dispense Instructions for use Diagnosis    cetirizine 10 MG tablet    zyrTEC    30 tablet    Take 1 tablet (10  mg) by mouth every evening        * cyclobenzaprine 10 MG tablet    FLEXERIL    14 tablet    Take 1 tablet (10 mg) by mouth nightly as needed for muscle spasms    Acute midline thoracic back pain       * cyclobenzaprine 10 MG tablet    FLEXERIL    30 tablet    Take 0.5-1 tablets (5-10 mg) by mouth 3 times daily as needed for muscle spasms    Right-sided low back pain without sciatica, unspecified chronicity       * DULoxetine 60 MG EC capsule    CYMBALTA    90 capsule    TAKE ONE CAPSULE BY MOUTH DAILY    Major depressive disorder, recurrent episode, moderate (H)       * DULoxetine 30 MG EC capsule    CYMBALTA    30 capsule    Take 1 capsule (30 mg) by mouth daily    Major depressive disorder, recurrent episode, moderate (H)       HYDROcodone-acetaminophen 5-325 MG per tablet    NORCO    20 tablet    Take 1 tablet by mouth every 6 hours as needed for moderate to severe pain    Flank pain       meclizine 25 MG tablet    ANTIVERT    30 tablet    Take 1 tablet (25 mg) by mouth every 6 hours as needed for dizziness    BPPV (benign paroxysmal positional vertigo), left       methylPREDNISolone 4 MG tablet    MEDROL DOSEPAK    21 tablet    Follow package instructions    Acute left-sided low back pain without sciatica       nitroGLYcerin 0.1 MG/HR 24 hr patch    NITRODUR          ondansetron 8 MG ODT tab    ZOFRAN ODT    20 tablet    Take 1 tablet (8 mg) by mouth every 8 hours as needed for nausea    BPPV (benign paroxysmal positional vertigo), left       TYLENOL PO           * Notice:  This list has 4 medication(s) that are the same as other medications prescribed for you. Read the directions carefully, and ask your doctor or other care provider to review them with you.

## 2017-07-31 NOTE — PROGRESS NOTES
Subjective:    Patient is a 46 year old female presenting with rehab back hpi. The history is provided by the patient. No  was used.   Milagro Frye is a 46 year old female with a lumbar condition.  Condition occurred with:  Lifting.  Condition occurred: at work.  This is a new condition  July 15th, 2017.    Patient reports pain:  Central lumbar spine and central thoracic spine.  Radiates to:  Other (wraps around to lower abdominal area).  Pain is described as sharp, shooting, stabbing and aching and is intermittent   Associated symptoms:  Loss of motion/stiffness. Pain is the same all the time.  Symptoms are exacerbated by bending, twisting, lifting, sitting and standing and relieved by ice, analgesics, muscle relaxants and rest.  Since onset symptoms are gradually improving.        General health as reported by patient is good.  Pertinent medical history includes:  Depression.  Medical allergies: yes.  Other surgeries include:  Orthopedic surgery and other.  Current medications:  Muscle relaxants, pain medication and anti-depressants.  Current occupation is Nurse  .  Patient is working in an alternate job.  Primary job tasks include:  Prolonged sitting, repetitive tasks, prolonged standing and lifting.    Barriers include:  None as reported by the patient.    Red flags:  Pain at rest/night.                        Objective:    System         Lumbar/SI Evaluation  ROM:    AROM Lumbar:   Flexion:            50% +  Ext:                    Min +   Side Bend:        Left:  Min +    Right:  50% +  Rotation:           Left:  Min+    Right:  Wnl,   Side Glide:        Left:     Right:         Strength: L hip ER 4/5 +, L knee flex 4/5 +, ext 4+/5  Lumbar Myotomes:  not assessed                    Lumbar Palpation:    Tenderness present at Left:    Erector Spinae  Tenderness present at Right: Erector Spinae      Spinal Segmental Conclusions:     Level: Hypo noted at T12 and L1                                                    General     ROS    Assessment/Plan:      Patient is a 46 year old female with lumbar complaints.    Patient has the following significant findings with corresponding treatment plan.                Diagnosis 1:  LBP  Pain -  hot/cold therapy, US and manual therapy  Decreased ROM/flexibility - manual therapy and therapeutic exercise  Decreased strength - therapeutic exercise and therapeutic activities    Therapy Evaluation Codes:   1) History comprised of:   Personal factors that impact the plan of care:      None.    Comorbidity factors that impact the plan of care are:      None.     Medications impacting care: None.  2) Examination of Body Systems comprised of:   Body structures and functions that impact the plan of care:      Lumbar spine.   Activity limitations that impact the plan of care are:      Bending, Sitting and Standing.  3) Clinical presentation characteristics are:   Stable/Uncomplicated.  4) Decision-Making    Low complexity using standardized patient assessment instrument and/or measureable assessment of functional outcome.  Cumulative Therapy Evaluation is: Low complexity.    Previous and current functional limitations:  (See Goal Flow Sheet for this information)    Short term and Long term goals: (See Goal Flow Sheet for this information)     Communication ability:  Patient appears to be able to clearly communicate and understand verbal and written communication and follow directions correctly.  Treatment Explanation - The following has been discussed with the patient:   RX ordered/plan of care  Anticipated outcomes  Possible risks and side effects  This patient would benefit from PT intervention to resume normal activities.   Rehab potential is good.    Frequency:  1 X week, once daily  Duration:  for 6 weeks  Discharge Plan:  Achieve all LTG.  Independent in home treatment program.  Reach maximal therapeutic benefit.    Please refer to the daily flowsheet for treatment  today, total treatment time and time spent performing 1:1 timed codes.

## 2017-08-10 ENCOUNTER — OFFICE VISIT (OUTPATIENT)
Dept: FAMILY MEDICINE | Facility: CLINIC | Age: 47
End: 2017-08-10
Payer: OTHER MISCELLANEOUS

## 2017-08-10 VITALS
HEIGHT: 66 IN | BODY MASS INDEX: 32 KG/M2 | HEART RATE: 115 BPM | DIASTOLIC BLOOD PRESSURE: 68 MMHG | OXYGEN SATURATION: 97 % | TEMPERATURE: 98.9 F | WEIGHT: 199.1 LBS | SYSTOLIC BLOOD PRESSURE: 114 MMHG

## 2017-08-10 DIAGNOSIS — M54.50 ACUTE MIDLINE LOW BACK PAIN WITHOUT SCIATICA: Primary | ICD-10-CM

## 2017-08-10 DIAGNOSIS — Z02.6 ENCOUNTER RELATED TO WORKER'S COMPENSATION CLAIM: ICD-10-CM

## 2017-08-10 PROCEDURE — 99213 OFFICE O/P EST LOW 20 MIN: CPT | Performed by: PHYSICIAN ASSISTANT

## 2017-08-10 NOTE — LETTER
[facility]  [address]  [facility] [address] [phone]    REPORT OF WORK ABILITY    NOTE TO EMPLOYEE: You must promptly provide a copy of this report to your  employer or worker's compensation insurer, and Qualified Rehabilitation Consultant.    Date: 8/10/17                   Employee Name: Milagro Frye         YOB: 1970  Medical Record Number: [id number]   Soc.Sec.No: [id number]  Employer: [employer]                 Date of Injury: 7/15/17    Diagnosis: Acute midline thoracic back pain  Work Related: yes    Permanent Partial Disability(PPD) likely: NO    EMPLOYEE IS ABLE TO WORK: with restrictions from 8/10/17 to 8/20/17 -  Full shift     RESTRICTIONS IF ANY:   Stand: Frequently (4-6 hours)   Sit: Full shift   Walk: Frequently (4-6 hours)   Kneel/Proctor: Occasionally (2-4 hours)    Lift, carry no more than:  10 - 20 lb. Occasionally (2-4 hours)   May use hands repetitively for:    Simple grasping: yes      Pushing/pulling: yes    Fine manipulation:  yes    Firm grasping:  yes     Operate Foot controls:  Right foot: not applicable   Left foot: not applicable     Bend:  Occasionally (2-4 hours)     Stoop: Occasionally (2-4 hours)   Twist: Not at all (0 hours)     Reach Above Shoulders: Frequently (4-6 hours)    OTHER RESTRICTIONS: Unable to transfer patients    TREATMENT PLAN/NOTES: change work position for comfort.    If work restrictions cannot be accommodated, patient is unable to work.    Bridgett Barry PA-C
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
## 2017-08-10 NOTE — MR AVS SNAPSHOT
"              After Visit Summary   8/10/2017    Milagro Frye    MRN: [id number]           Patient Information     Date Of Birth          1970        Visit Information        Provider Department      8/10/2017 1:40 PM Bridgett Barry PA-C [facility] [place]        Today's Diagnoses     Acute midline low back pain without sciatica    -  1    Encounter related to worker's compensation claim           Follow-ups after your visit        Who to contact     If you have questions or need follow up information about today's clinic visit or your schedule please contact [facility] [place] directly at [phone].  Normal or non-critical lab and imaging results will be communicated to you by EARTHTORYhart, letter or phone within 4 business days after the clinic has received the results. If you do not hear from us within 7 days, please contact the clinic through [facility] or phone. If you have a critical or abnormal lab result, we will notify you by phone as soon as possible.  Submit refill requests through [employer] or call your pharmacy and they will forward the refill request to us. Please allow 3 business days for your refill to be completed.          Additional Information About Your Visit        MyChart Information     [employer] gives you secure access to your electronic health record. If you see a primary care provider, you can also send messages to your care team and make appointments. If you have questions, please call your primary care clinic.  If you do not have a primary care provider, please call [phone] and they will assist you.        Care EveryWhere ID     This is your Care EveryWhere ID. This could be used by other organizations to access your [facility] medical records  [id number]        Your Vitals Were     Pulse Temperature Height Pulse Oximetry BMI (Body Mass Index)       115 98.9  F (37.2  C) (Oral) 5' 6\" (1.676 m) 97% 32.14 kg/m2        Blood Pressure from Last 3 Encounters:   08/10/17 " 114/68   07/31/17 118/76   07/21/17 122/72    Weight from Last 3 Encounters:   08/10/17 199 lb 1.6 oz (90.3 kg)   07/31/17 198 lb 11.2 oz (90.1 kg)   07/21/17 195 lb 12.8 oz (88.8 kg)              Today, you had the following     No orders found for display       Primary Care Provider    [facility]       No address on file        Equal Access to Services     HARVEY VENEGAS : Hadii aad ku hadasho Soomaali, waaxda luqadaha, qaybta kaalmada adeegyada, waxay mariein hayshaunn jono guillaume. So [facility] [phone].    ATENCIÓN: Si habla español, tiene a higgins disposición servicios gratuitos de asistencia lingüística. Llame al [phone].    We comply with applicable federal civil rights laws and [place] laws. We do not discriminate on the basis of race, color, national origin, age, disability sex, sexual orientation or gender identity.            Thank you!     Thank you for choosing [facility]  for your care. Our goal is always to provide you with excellent care. Hearing back from our patients is one way we can continue to improve our services. Please take a few minutes to complete the written survey that you may receive in the mail after your visit with us. Thank you!             Your Updated Medication List - Protect others around you: Learn how to safely use, store and throw away your medicines at www.disposemymeds.org.          This list is accurate as of: 8/10/17 11:59 PM.  Always use your most recent med list.                   Brand Name Dispense Instructions for use Diagnosis    cetirizine 10 MG tablet    zyrTEC    30 tablet    Take 1 tablet (10 mg) by mouth every evening        * cyclobenzaprine 10 MG tablet    FLEXERIL    14 tablet    Take 1 tablet (10 mg) by mouth nightly as needed for muscle spasms    Acute midline thoracic back pain       * cyclobenzaprine 10 MG tablet    FLEXERIL    30 tablet    Take 0.5-1 tablets (5-10 mg) by mouth 3 times daily as needed for muscle spasms     Right-sided low back pain without sciatica, unspecified chronicity       * DULoxetine 60 MG EC capsule    CYMBALTA    90 capsule    TAKE ONE CAPSULE BY MOUTH DAILY    Major depressive disorder, recurrent episode, moderate (H)       * DULoxetine 30 MG EC capsule    CYMBALTA    30 capsule    Take 1 capsule (30 mg) by mouth daily    Major depressive disorder, recurrent episode, moderate (H)       HYDROcodone-acetaminophen 5-325 MG per tablet    NORCO    20 tablet    Take 1 tablet by mouth every 6 hours as needed for moderate to severe pain    Flank pain       meclizine 25 MG tablet    ANTIVERT    30 tablet    Take 1 tablet (25 mg) by mouth every 6 hours as needed for dizziness    BPPV (benign paroxysmal positional vertigo), left       methylPREDNISolone 4 MG tablet    MEDROL DOSEPAK    21 tablet    Follow package instructions    Acute left-sided low back pain without sciatica       nitroGLYcerin 0.1 MG/HR 24 hr patch    NITRODUR          ondansetron 8 MG ODT tab    ZOFRAN ODT    20 tablet    Take 1 tablet (8 mg) by mouth every 8 hours as needed for nausea    BPPV (benign paroxysmal positional vertigo), left       TYLENOL PO           * Notice:  This list has 4 medication(s) that are the same as other medications prescribed for you. Read the directions carefully, and ask your doctor or other care provider to review them with you.

## 2017-08-10 NOTE — NURSING NOTE
"Chief Complaint   Patient presents with     Work Comp       Initial /68 (BP Location: Right arm, Patient Position: Sitting, Cuff Size: Adult Regular)  Pulse 115  Temp 98.9  F (37.2  C) (Oral)  Ht 5' 6\" (1.676 m)  Wt 199 lb 1.6 oz (90.3 kg)  SpO2 97%  BMI 32.14 kg/m2 Estimated body mass index is 32.14 kg/(m^2) as calculated from the following:    Height as of this encounter: 5' 6\" (1.676 m).    Weight as of this encounter: 199 lb 1.6 oz (90.3 kg).  Medication Reconciliation: complete   Patsy Lin MA  "

## 2017-08-18 DIAGNOSIS — F33.1 MAJOR DEPRESSIVE DISORDER, RECURRENT EPISODE, MODERATE (H): ICD-10-CM

## 2017-08-21 RX ORDER — DULOXETIN HYDROCHLORIDE 30 MG/1
CAPSULE, DELAYED RELEASE ORAL
Qty: 30 CAPSULE | Refills: 0 | OUTPATIENT
Start: 2017-08-21

## 2017-08-21 NOTE — TELEPHONE ENCOUNTER
Cymbalta    Last Written Prescription Date: 6/2/2017  Last Fill Quantity: 90, # refills: 0  Last Office Visit with [facility], [facility] or [facility] prescribing provider: 8/10/2017        BP Readings from Last 3 Encounters:   08/10/17 114/68   07/31/17 118/76   07/21/17 122/72     Pulse: (for Fetzima)  Creatinine   Date Value Ref Range Status   02/19/2016 0.98 0.52 - 1.04 mg/dL Final   ]    Last PHQ-9 score on record=   PHQ-9 SCORE 6/1/2017   Total Score -   Total Score MyChart 10 (Moderate depression)   Total Score 10

## 2017-08-24 ENCOUNTER — OFFICE VISIT (OUTPATIENT)
Dept: FAMILY MEDICINE | Facility: CLINIC | Age: 47
End: 2017-08-24
Payer: COMMERCIAL

## 2017-08-24 VITALS
WEIGHT: 202.2 LBS | OXYGEN SATURATION: 97 % | SYSTOLIC BLOOD PRESSURE: 112 MMHG | DIASTOLIC BLOOD PRESSURE: 78 MMHG | TEMPERATURE: 98.6 F | HEIGHT: 66 IN | HEART RATE: 117 BPM | BODY MASS INDEX: 32.5 KG/M2

## 2017-08-24 DIAGNOSIS — F33.1 MAJOR DEPRESSIVE DISORDER, RECURRENT EPISODE, MODERATE (H): ICD-10-CM

## 2017-08-24 DIAGNOSIS — Z02.6 ENCOUNTER RELATED TO WORKER'S COMPENSATION CLAIM: ICD-10-CM

## 2017-08-24 DIAGNOSIS — M54.50 RIGHT-SIDED LOW BACK PAIN WITHOUT SCIATICA, UNSPECIFIED CHRONICITY: Primary | ICD-10-CM

## 2017-08-24 PROCEDURE — 99213 OFFICE O/P EST LOW 20 MIN: CPT | Performed by: PHYSICIAN ASSISTANT

## 2017-08-24 RX ORDER — CYCLOBENZAPRINE HCL 10 MG
5-10 TABLET ORAL 3 TIMES DAILY PRN
Qty: 30 TABLET | Refills: 1 | Status: SHIPPED | OUTPATIENT
Start: 2017-08-24 | End: 2018-07-13

## 2017-08-24 RX ORDER — METHYLPREDNISOLONE 4 MG
TABLET, DOSE PACK ORAL
Qty: 21 TABLET | Refills: 0 | Status: SHIPPED | OUTPATIENT
Start: 2017-08-24 | End: 2017-09-06

## 2017-08-24 ASSESSMENT — ANXIETY QUESTIONNAIRES
6. BECOMING EASILY ANNOYED OR IRRITABLE: MORE THAN HALF THE DAYS
GAD7 TOTAL SCORE: 11
IF YOU CHECKED OFF ANY PROBLEMS ON THIS QUESTIONNAIRE, HOW DIFFICULT HAVE THESE PROBLEMS MADE IT FOR YOU TO DO YOUR WORK, TAKE CARE OF THINGS AT HOME, OR GET ALONG WITH OTHER PEOPLE: SOMEWHAT DIFFICULT
3. WORRYING TOO MUCH ABOUT DIFFERENT THINGS: MORE THAN HALF THE DAYS
7. FEELING AFRAID AS IF SOMETHING AWFUL MIGHT HAPPEN: SEVERAL DAYS
5. BEING SO RESTLESS THAT IT IS HARD TO SIT STILL: NOT AT ALL
2. NOT BEING ABLE TO STOP OR CONTROL WORRYING: MORE THAN HALF THE DAYS
1. FEELING NERVOUS, ANXIOUS, OR ON EDGE: MORE THAN HALF THE DAYS

## 2017-08-24 ASSESSMENT — PATIENT HEALTH QUESTIONNAIRE - PHQ9
5. POOR APPETITE OR OVEREATING: MORE THAN HALF THE DAYS
SUM OF ALL RESPONSES TO PHQ QUESTIONS 1-9: 5

## 2017-08-24 NOTE — LETTER
My Depression Action Plan  Name: Milagro Frye   Date of Birth 1970  Date: 8/24/2017    My doctor: [facility] [place] [facility]   My clinic: [facility] [place]  [address]  [place] [address] [phone]          GREEN    ZONE   Good Control    What it looks like:     Things are going generally well. You have normal up s and down s. You may even feel depressed from time to time, but bad moods usually last less than a day.   What you need to do:  1. Continue to care for yourself (see self care plan)  2. Check your depression survival kit and update it as needed  3. Follow your physician s recommendations including any medication.  4. Do not stop taking medication unless you consult with your physician first.           YELLOW         ZONE Getting Worse    What it looks like:     Depression is starting to interfere with your life.     It may be hard to get out of bed; you may be starting to isolate yourself from others.    Symptoms of depression are starting to last most all day and this has happened for several days.     You may have suicidal thoughts but they are not constant.   What you need to do:     1. Call your care team, your response to treatment will improve if you keep your care team informed of your progress. Yellow periods are signs an adjustment may need to be made.     2. Continue your self-care, even if you have to fake it!    3. Talk to someone in your support network    4. Open up your depression survival kit           RED    ZONE Medical Alert - Get Help    What it looks like:     Depression is seriously interfering with your life.     You may experience these or other symptoms: You can t get out of bed most days, can t work or engage in other necessary activities, you have trouble taking care of basic hygiene, or basic responsibilities, thoughts of suicide or death that will not go away, self-injurious behavior.     What you need to do:  1. Call your care team and  request a same-day appointment. If they are not available (weekends or after hours) call your local crisis line, emergency room or 911.      Electronically signed by: Patsy Lin, August 24, 2017    Depression Self Care Plan / Survival Kit    Self-Care for Depression  Here s the deal. Your body and mind are really not as separate as most people think.  What you do and think affects how you feel and how you feel influences what you do and think. This means if you do things that people who feel good do, it will help you feel better.  Sometimes this is all it takes.  There is also a place for medication and therapy depending on how severe your depression is, so be sure to consult with your medical provider and/ or Behavioral Health Consultant if your symptoms are worsening or not improving.     In order to better manage my stress, I will:    Exercise  Get some form of exercise, every day. This will help reduce pain and release endorphins, the  feel good  chemicals in your brain. This is almost as good as taking antidepressants!  This is not the same as joining a gym and then never going! (they count on that by the way ) It can be as simple as just going for a walk or doing some gardening, anything that will get you moving.      Hygiene   Maintain good hygiene (Get out of bed in the morning, Make your bed, Brush your teeth, Take a shower, and Get dressed like you were going to work, even if you are unemployed).  If your clothes don't fit try to get ones that do.    Diet  I will strive to eat foods that are good for me, drink plenty of water, and avoid excessive sugar, caffeine, alcohol, and other mood-altering substances.  Some foods that are helpful in depression are: complex carbohydrates, B vitamins, flaxseed, fish or fish oil, fresh fruits and vegetables.    Psychotherapy  I agree to participate in Individual Therapy (if recommended).    Medication  If prescribed medications, I agree to take them.  Missing  doses can result in serious side effects.  I understand that drinking alcohol, or other illicit drug use, may cause potential side effects.  I will not stop my medication abruptly without first discussing it with my provider.    Staying Connected With Others  I will stay in touch with my friends, family members, and my primary care provider/team.    Use your imagination  Be creative.  We all have a creative side; it doesn t matter if it s oil painting, sand castles, or mud pies! This will also kick up the endorphins.    Witness Beauty  (AKA stop and smell the roses) Take a look outside, even in mid-winter. Notice colors, textures. Watch the squirrels and birds.     Service to others  Be of service to others.  There is always someone else in need.  By helping others we can  get out of ourselves  and remember the really important things.  This also provides opportunities for practicing all the other parts of the program.    Humor  Laugh and be silly!  Adjust your TV habits for less news and crime-drama and more comedy.    Control your stress  Try breathing deep, massage therapy, biofeedback, and meditation. Find time to relax each day.     My support system    Clinic Contact:  Phone number:    Contact 1:  Phone number:    Contact 2:  Phone number:    Gnosticism/:  Phone number:    Therapist:  Phone number:    Local crisis center:    Phone number:    Other community support:  Phone number:

## 2017-08-24 NOTE — LETTER
[facility]  [address]  [facility] [id number] [phone]    REPORT OF WORK ABILITY    NOTE TO EMPLOYEE: You must promptly provide a copy of this report to your  employer or worker's compensation insurer, and Qualified Rehabilitation Consultant.    Date: 8/24/17                   Employee Name: Milagro Frye         YOB: 1970  Medical Record Number: [id number]   Soc.Sec.No: [id number]  Employer: [employer]                 Date of Injury: 7/15/17    Diagnosis: Acute midline thoracic back pain  Work Related: yes    Permanent Partial Disability(PPD) likely: NO    EMPLOYEE IS ABLE TO WORK: with restrictions from 8/24/17 - 9/3/17  Full shift     RESTRICTIONS IF ANY:   Stand: Frequently (4-6 hours)   Sit: Full shift   Walk: Frequently (4-6 hours)   Kneel/[place]: Occasionally (2-4 hours)    Lift, carry no more than:  10 - 20 lb. Occasionally (2-4 hours)   May use hands repetitively for:    Simple grasping: yes      Pushing/pulling: yes    Fine manipulation:  yes    Firm grasping:  yes     Operate Foot controls:  Right foot: not applicable   Left foot: not applicable     Bend:  Occasionally (2-4 hours)     Stoop: Occasionally (2-4 hours)   Twist: Not at all (0 hours)     Reach Above Shoulders: Frequently (4-6 hours)    OTHER RESTRICTIONS: Unable to transfer patients    TREATMENT PLAN/NOTES: change work position for comfort.    If work restrictions cannot be accommodated, patient is unable to work.    Bridgett Barry PA-C

## 2017-08-24 NOTE — NURSING NOTE
"Chief Complaint   Patient presents with     Work Comp       Initial /78 (BP Location: Right arm, Patient Position: Sitting, Cuff Size: Adult Regular)  Pulse 117  Temp 98.6  F (37  C) (Oral)  Ht 5' 6\" (1.676 m)  Wt 202 lb 3.2 oz (91.7 kg)  SpO2 97%  BMI 32.64 kg/m2 Estimated body mass index is 32.64 kg/(m^2) as calculated from the following:    Height as of this encounter: 5' 6\" (1.676 m).    Weight as of this encounter: 202 lb 3.2 oz (91.7 kg).  Medication Reconciliation: complete   Patsy Lin MA  "

## 2017-08-24 NOTE — MR AVS SNAPSHOT
"              After Visit Summary   8/24/2017    Milagro Frye    MRN: [id number]           Patient Information     Date Of Birth          1970        Visit Information        Provider Department      8/24/2017 8:40 AM Bridgett Barry [facility]        Today's Diagnoses     Right-sided low back pain without sciatica, unspecified chronicity    -  1    Major depressive disorder, recurrent episode, moderate (H)        Encounter related to worker's compensation claim           Follow-ups after your visit        Who to contact     If you have questions or need follow up information about today's clinic visit or your schedule please contact [facility] [place] directly at [phone].  Normal or non-critical lab and imaging results will be communicated to you by Spoolhart, letter or phone within 4 business days after the clinic has received the results. If you do not hear from us within 7 days, please contact the clinic through Spoolhart or phone. If you have a critical or abnormal lab result, we will notify you by phone as soon as possible.  Submit refill requests through [employer] or call your pharmacy and they will forward the refill request to us. Please allow 3 business days for your refill to be completed.          Additional Information About Your Visit        MyChart Information     [employer] gives you secure access to your electronic health record. If you see a primary care provider, you can also send messages to your care team and make appointments. If you have questions, please call your primary care clinic.  If you do not have a primary care provider, please call [phone] and they will assist you.        Care EveryWhere ID     This is your Care EveryWhere ID. This could be used by other organizations to access your [place] medical records  [id number]        Your Vitals Were     Pulse Temperature Height Pulse Oximetry BMI (Body Mass Index)       117 98.6  F (37  C) (Oral) 5' 6\" " (1.676 m) 97% 32.64 kg/m2        Blood Pressure from Last 3 Encounters:   08/24/17 112/78   08/10/17 114/68   07/31/17 118/76    Weight from Last 3 Encounters:   08/24/17 202 lb 3.2 oz (91.7 kg)   08/10/17 199 lb 1.6 oz (90.3 kg)   07/31/17 198 lb 11.2 oz (90.1 kg)              We Performed the Following     DEPRESSION ACTION PLAN (DAP)          Today's Medication Changes          These changes are accurate as of: 8/24/17  9:37 AM.  If you have any questions, ask your nurse or doctor.               These medicines have changed or have updated prescriptions.        Dose/Directions    * methylPREDNISolone 4 MG tablet   Commonly known as:  MEDROL DOSEPAK   This may have changed:  Another medication with the same name was added. Make sure you understand how and when to take each.   Used for:  Acute left-sided low back pain without sciatica   Changed by:  Bridgett Barry PA-C        Follow package instructions   Quantity:  21 tablet   Refills:  0       * methylPREDNISolone 4 MG tablet   Commonly known as:  MEDROL DOSEPAK   This may have changed:  You were already taking a medication with the same name, and this prescription was added. Make sure you understand how and when to take each.   Used for:  Right-sided low back pain without sciatica, unspecified chronicity   Changed by:  Bridgett Barry PA-C        Follow package instructions   Quantity:  21 tablet   Refills:  0       * Notice:  This list has 2 medication(s) that are the same as other medications prescribed for you. Read the directions carefully, and ask your doctor or other care provider to review them with you.         Where to get your medicines      These medications were sent to [facility] [address]  [place], [place] - [address] [facility] [address] [address], [facility] [address]    Hours:  24-hours Phone:  [phone]     cyclobenzaprine 10 MG tablet    methylPREDNISolone 4 MG tablet                Primary Care  Provider    [facility]       No address on file        Equal Access to Services     HARVEY VENEGAS : Hadii aad ku rico Sojarett, warodrickda luqadaha, qatereseta kalesleeda rachelharris, waxnae dina ummashlie ramoswilma muellerlashaun guillaume. So [facility] [phone].    ATENCIÓN: Si habla español, tiene a higgins disposición servicios gratuitos de asistencia lingüística. Llame al [phone].    We comply with applicable federal civil rights laws and [place] laws. We do not discriminate on the basis of race, color, national origin, age, disability sex, sexual orientation or gender identity.            Thank you!     Thank you for choosing [facility]  for your care. Our goal is always to provide you with excellent care. Hearing back from our patients is one way we can continue to improve our services. Please take a few minutes to complete the written survey that you may receive in the mail after your visit with us. Thank you!             Your Updated Medication List - Protect others around you: Learn how to safely use, store and throw away your medicines at www.disposemymeds.org.          This list is accurate as of: 8/24/17  9:37 AM.  Always use your most recent med list.                   Brand Name Dispense Instructions for use Diagnosis    cetirizine 10 MG tablet    zyrTEC    30 tablet    Take 1 tablet (10 mg) by mouth every evening        * cyclobenzaprine 10 MG tablet    FLEXERIL    14 tablet    Take 1 tablet (10 mg) by mouth nightly as needed for muscle spasms    Acute midline thoracic back pain       * cyclobenzaprine 10 MG tablet    FLEXERIL    30 tablet    Take 0.5-1 tablets (5-10 mg) by mouth 3 times daily as needed for muscle spasms    Right-sided low back pain without sciatica, unspecified chronicity       * DULoxetine 60 MG EC capsule    CYMBALTA    90 capsule    TAKE ONE CAPSULE BY MOUTH DAILY    Major depressive disorder, recurrent episode, moderate (H)       * DULoxetine 30 MG EC capsule    CYMBALTA     30 capsule    Take 1 capsule (30 mg) by mouth daily    Major depressive disorder, recurrent episode, moderate (H)       HYDROcodone-acetaminophen 5-325 MG per tablet    NORCO    20 tablet    Take 1 tablet by mouth every 6 hours as needed for moderate to severe pain    Flank pain       meclizine 25 MG tablet    ANTIVERT    30 tablet    Take 1 tablet (25 mg) by mouth every 6 hours as needed for dizziness    BPPV (benign paroxysmal positional vertigo), left       * methylPREDNISolone 4 MG tablet    MEDROL DOSEPAK    21 tablet    Follow package instructions    Acute left-sided low back pain without sciatica       * methylPREDNISolone 4 MG tablet    MEDROL DOSEPAK    21 tablet    Follow package instructions    Right-sided low back pain without sciatica, unspecified chronicity       nitroGLYcerin 0.1 MG/HR 24 hr patch    NITRODUR          ondansetron 8 MG ODT tab    ZOFRAN ODT    20 tablet    Take 1 tablet (8 mg) by mouth every 8 hours as needed for nausea    BPPV (benign paroxysmal positional vertigo), left       TYLENOL PO           * Notice:  This list has 6 medication(s) that are the same as other medications prescribed for you. Read the directions carefully, and ask your doctor or other care provider to review them with you.

## 2017-08-25 ASSESSMENT — ANXIETY QUESTIONNAIRES: GAD7 TOTAL SCORE: 11

## 2017-08-25 NOTE — TELEPHONE ENCOUNTER
Name of caller: Milagro  Relationship of Patient: Self    Reason for Call: Patient stated she is out of her medication and the pharmacy will not refill per previous note that its too soon to fill.     Best phone number to reach pt at is: [phone]  Ok to leave a message with medical info? Yes    Pharmacy preferred (if calling for a refill): [facility] [address]    Alexa Denton  [facility] [employer] [facility]-Patient Representative

## 2017-08-28 NOTE — TELEPHONE ENCOUNTER
Routing refill request to provider for review/approval because:  Labs out of range:  PHQ-9  Labs not current:  Creatinine  Alexa Bassett RN, BSN  [facility]

## 2017-09-06 ENCOUNTER — OFFICE VISIT (OUTPATIENT)
Dept: FAMILY MEDICINE | Facility: CLINIC | Age: 47
End: 2017-09-06
Payer: OTHER MISCELLANEOUS

## 2017-09-06 VITALS
WEIGHT: 203.8 LBS | TEMPERATURE: 98.6 F | SYSTOLIC BLOOD PRESSURE: 114 MMHG | DIASTOLIC BLOOD PRESSURE: 76 MMHG | BODY MASS INDEX: 32.75 KG/M2 | HEART RATE: 119 BPM | OXYGEN SATURATION: 98 % | HEIGHT: 66 IN

## 2017-09-06 DIAGNOSIS — Z23 NEED FOR PROPHYLACTIC VACCINATION AND INOCULATION AGAINST INFLUENZA: ICD-10-CM

## 2017-09-06 DIAGNOSIS — Z02.6 ENCOUNTER RELATED TO WORKER'S COMPENSATION CLAIM: ICD-10-CM

## 2017-09-06 DIAGNOSIS — M54.50 RIGHT-SIDED LOW BACK PAIN WITHOUT SCIATICA, UNSPECIFIED CHRONICITY: Primary | ICD-10-CM

## 2017-09-06 DIAGNOSIS — F33.1 MAJOR DEPRESSIVE DISORDER, RECURRENT EPISODE, MODERATE (H): ICD-10-CM

## 2017-09-06 PROCEDURE — 90686 IIV4 VACC NO PRSV 0.5 ML IM: CPT | Performed by: PHYSICIAN ASSISTANT

## 2017-09-06 PROCEDURE — 99213 OFFICE O/P EST LOW 20 MIN: CPT | Mod: 25 | Performed by: PHYSICIAN ASSISTANT

## 2017-09-06 PROCEDURE — 90471 IMMUNIZATION ADMIN: CPT | Performed by: PHYSICIAN ASSISTANT

## 2017-09-06 RX ORDER — DULOXETIN HYDROCHLORIDE 60 MG/1
60 CAPSULE, DELAYED RELEASE ORAL DAILY
Qty: 90 CAPSULE | Refills: 1 | Status: SHIPPED | OUTPATIENT
Start: 2017-09-06 | End: 2018-07-13

## 2017-09-06 RX ORDER — DULOXETIN HYDROCHLORIDE 30 MG/1
30 CAPSULE, DELAYED RELEASE ORAL DAILY
Qty: 90 CAPSULE | Refills: 1 | Status: SHIPPED | OUTPATIENT
Start: 2017-09-06 | End: 2018-04-30

## 2017-09-06 NOTE — NURSING NOTE
"Chief Complaint   Patient presents with     Work Comp     RECHECK       Initial /76  Pulse 119  Temp 98.6  F (37  C) (Oral)  Ht 5' 6\" (1.676 m)  Wt 203 lb 12.8 oz (92.4 kg)  SpO2 98%  BMI 32.89 kg/m2 Estimated body mass index is 32.89 kg/(m^2) as calculated from the following:    Height as of this encounter: 5' 6\" (1.676 m).    Weight as of this encounter: 203 lb 12.8 oz (92.4 kg).  Medication Reconciliation: complete  "
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
## 2017-09-06 NOTE — LETTER
[facility]  [address]  [facility] [address] [phone]    REPORT OF WORK ABILITY    NOTE TO EMPLOYEE: You must promptly provide a copy of this report to your  employer or worker's compensation insurer, and Qualified Rehabilitation Consultant.    Date: 9/6/17                  Employee Name: Milagro Frye         YOB: 1970  Medical Record Number: [id number]   Soc.Sec.No: [id number]  Employer: [employer]                 Date of Injury: 7/15/17    Diagnosis: Acute midline thoracic back pain  Work Related: yes    Permanent Partial Disability(PPD) likely: NO    EMPLOYEE IS ABLE TO WORK: without restrictions from 9/8/17 - 9/14/17  Full shift     RESTRICTIONS IF ANY:   No restrictions    TREATMENT PLAN/NOTES: change work position for comfort.      Bridgett Barry PA-C

## 2017-09-06 NOTE — MR AVS SNAPSHOT
After Visit Summary   9/6/2017    Milagro Frye    MRN: [id number]           Patient Information     Date Of Birth          1970        Visit Information        Provider Department      9/6/2017 8:40 AM Bridgett Barry PA-C [facility]        Today's Diagnoses     Right-sided low back pain without sciatica, unspecified chronicity    -  1    Encounter related to worker's compensation claim        Need for prophylactic vaccination and inoculation against influenza        Major depressive disorder, recurrent episode, moderate (H)           Follow-ups after your visit        Who to contact     If you have questions or need follow up information about today's clinic visit or your schedule please contact [facility] directly at [phone].  Normal or non-critical lab and imaging results will be communicated to you by [facility], letter or phone within 4 business days after the clinic has received the results. If you do not hear from us within 7 days, please contact the clinic through [facility] or phone. If you have a critical or abnormal lab result, we will notify you by phone as soon as possible.  Submit refill requests through [facility] or call your pharmacy and they will forward the refill request to us. Please allow 3 business days for your refill to be completed.          Additional Information About Your Visit        MyChart Information     [facility]abo gives you secure access to your electronic health record. If you see a primary care provider, you can also send messages to your care team and make appointments. If you have questions, please call your primary care clinic.  If you do not have a primary care provider, please call [phone] and they will assist you.        Care EveryWhere ID     This is your Care EveryWhere ID. This could be used by other organizations to access your [facility] medical records  [id number]        Your Vitals Were     Pulse Temperature Height  "Pulse Oximetry BMI (Body Mass Index)       119 98.6  F (37  C) (Oral) 5' 6\" (1.676 m) 98% 32.89 kg/m2        Blood Pressure from Last 3 Encounters:   09/06/17 114/76   08/24/17 112/78   08/10/17 114/68    Weight from Last 3 Encounters:   09/06/17 203 lb 12.8 oz (92.4 kg)   08/24/17 202 lb 3.2 oz (91.7 kg)   08/10/17 199 lb 1.6 oz (90.3 kg)              We Performed the Following     FLU VAC, SPLIT VIRUS IM > 3 YO (QUADRIVALENT) [97958]     Vaccine Administration, Initial [34631]          Today's Medication Changes          These changes are accurate as of: 9/6/17 11:03 AM.  If you have any questions, ask your nurse or doctor.               These medicines have changed or have updated prescriptions.        Dose/Directions    * DULoxetine 60 MG EC capsule   Commonly known as:  CYMBALTA   This may have changed:  See the new instructions.   Used for:  Major depressive disorder, recurrent episode, moderate (H)   Changed by:  Bridgett Barry PA-C        Dose:  60 mg   Take 1 capsule (60 mg) by mouth daily   Quantity:  90 capsule   Refills:  1       * DULoxetine 30 MG EC capsule   Commonly known as:  CYMBALTA   This may have changed:  Another medication with the same name was changed. Make sure you understand how and when to take each.   Used for:  Major depressive disorder, recurrent episode, moderate (H)   Changed by:  Bridgett Barry PA-C        Dose:  30 mg   Take 1 capsule (30 mg) by mouth daily   Quantity:  90 capsule   Refills:  1       * Notice:  This list has 2 medication(s) that are the same as other medications prescribed for you. Read the directions carefully, and ask your doctor or other care provider to review them with you.         Where to get your medicines      These medications were sent to [facility] [id number]  [place] [address] [address] [address] [address] [address], [facility] [address]    Hours:  24-hours Phone:  [phone]     DULoxetine 30 MG EC " capsule    DULoxetine 60 MG EC capsule                Primary Care Provider    [facility]       No address on file        Equal Access to Services     HARVEY VENEGAS : Hadii micah virgen rico Dumont, warodrickda luqadaha, qaybta kaalmada becca, rj mariein hayaaashlie ramoswilma moses matteo guillaume. So [facility] [phone].    ATENCIÓN: Si habla español, tiene a higgins disposición servicios gratuitos de asistencia lingüística. Llame al [phone].    We comply with applicable federal civil rights laws and [place] laws. We do not discriminate on the basis of race, color, national origin, age, disability sex, sexual orientation or gender identity.            Thank you!     Thank you for choosing [facility]  for your care. Our goal is always to provide you with excellent care. Hearing back from our patients is one way we can continue to improve our services. Please take a few minutes to complete the written survey that you may receive in the mail after your visit with us. Thank you!             Your Updated Medication List - Protect others around you: Learn how to safely use, store and throw away your medicines at www.disposemymeds.org.          This list is accurate as of: 9/6/17 11:03 AM.  Always use your most recent med list.                   Brand Name Dispense Instructions for use Diagnosis    cetirizine 10 MG tablet    zyrTEC    30 tablet    Take 1 tablet (10 mg) by mouth every evening        cyclobenzaprine 10 MG tablet    FLEXERIL    30 tablet    Take 0.5-1 tablets (5-10 mg) by mouth 3 times daily as needed for muscle spasms    Right-sided low back pain without sciatica, unspecified chronicity       * DULoxetine 60 MG EC capsule    CYMBALTA    90 capsule    Take 1 capsule (60 mg) by mouth daily    Major depressive disorder, recurrent episode, moderate (H)       * DULoxetine 30 MG EC capsule    CYMBALTA    90 capsule    Take 1 capsule (30 mg) by mouth daily    Major depressive disorder, recurrent  episode, moderate (H)       meclizine 25 MG tablet    ANTIVERT    30 tablet    Take 1 tablet (25 mg) by mouth every 6 hours as needed for dizziness    BPPV (benign paroxysmal positional vertigo), left       nitroGLYcerin 0.1 MG/HR 24 hr patch    NITRODUR          ondansetron 8 MG ODT tab    ZOFRAN ODT    20 tablet    Take 1 tablet (8 mg) by mouth every 8 hours as needed for nausea    BPPV (benign paroxysmal positional vertigo), left       TYLENOL PO           * Notice:  This list has 2 medication(s) that are the same as other medications prescribed for you. Read the directions carefully, and ask your doctor or other care provider to review them with you.

## 2017-09-06 NOTE — PROGRESS NOTES
Injectable Influenza Immunization Documentation    1.  Is the person to be vaccinated sick today?  No    2. Does the person to be vaccinated have an allergy to eggs or to a component of the vaccine?  No    3. Has the person to be vaccinated today ever had a serious reaction to influenza vaccine in the past?  No    4. Has the person to be vaccinated ever had Guillain-New Century syndrome?  No     Form completed by feng Benitez CMA

## 2017-09-21 ENCOUNTER — TELEPHONE (OUTPATIENT)
Dept: FAMILY MEDICINE | Facility: CLINIC | Age: 47
End: 2017-09-21

## 2017-09-21 DIAGNOSIS — Z11.1 SCREENING FOR TUBERCULOSIS: ICD-10-CM

## 2017-09-21 DIAGNOSIS — Z11.1 SCREENING EXAMINATION FOR PULMONARY TUBERCULOSIS: Primary | ICD-10-CM

## 2017-09-21 NOTE — TELEPHONE ENCOUNTER
Name of caller: Milagro  Relationship of Patient: Self    Reason for Call: Patient would like a TB Gold test taken on Monday in our lab, she is scheduled for 930am. She just needs an order placed in her chart.     Best phone number to reach pt at is: [phone]  Ok to leave a message with medical info? Yes    Pharmacy preferred (if calling for a refill): [facility]

## 2017-09-21 NOTE — TELEPHONE ENCOUNTER
Quantifieron labs pended.  Please order if appropriate,  Fani Soriano RN - Triage  [facility]

## 2017-09-25 DIAGNOSIS — Z11.1 SCREENING FOR TUBERCULOSIS: ICD-10-CM

## 2017-09-25 PROCEDURE — 36415 COLL VENOUS BLD VENIPUNCTURE: CPT | Performed by: PHYSICIAN ASSISTANT

## 2017-09-25 PROCEDURE — 86480 TB TEST CELL IMMUN MEASURE: CPT | Performed by: PHYSICIAN ASSISTANT

## 2017-09-27 LAB
M TB TUBERC IFN-G BLD QL: NEGATIVE
M TB TUBERC IFN-G/MITOGEN IGNF BLD: 0 IU/ML

## 2017-09-27 NOTE — PROGRESS NOTES
Dear Milagro,    Your TB test came back normal.    Please contact the clinic if you have additional questions.  Thank you.    Sincerely,    Bridgett Barry PA-C

## 2017-10-21 ENCOUNTER — OFFICE VISIT (OUTPATIENT)
Dept: URGENT CARE | Facility: URGENT CARE | Age: 47
End: 2017-10-21
Payer: COMMERCIAL

## 2017-10-21 VITALS
WEIGHT: 203 LBS | HEART RATE: 76 BPM | BODY MASS INDEX: 32.77 KG/M2 | DIASTOLIC BLOOD PRESSURE: 80 MMHG | OXYGEN SATURATION: 99 % | SYSTOLIC BLOOD PRESSURE: 120 MMHG | RESPIRATION RATE: 16 BRPM | TEMPERATURE: 98.2 F

## 2017-10-21 DIAGNOSIS — Z23 NEED FOR VACCINATION: Primary | ICD-10-CM
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
## 2017-10-21 DIAGNOSIS — S61.258A CAT BITE OF INDEX FINGER, INITIAL ENCOUNTER: ICD-10-CM

## 2017-10-21 PROCEDURE — 99213 OFFICE O/P EST LOW 20 MIN: CPT | Mod: 25 | Performed by: FAMILY MEDICINE

## 2017-10-21 PROCEDURE — 90471 IMMUNIZATION ADMIN: CPT | Performed by: FAMILY MEDICINE

## 2017-10-21 PROCEDURE — 90715 TDAP VACCINE 7 YRS/> IM: CPT | Performed by: FAMILY MEDICINE

## 2017-10-21 NOTE — NURSING NOTE
"Milagro Frye is a 47 year old female.      Chief Complaint   Patient presents with     Urgent Care     Cat Bite     pt is here for a cat bite in her L 2nd finger - pt's own cat and is UTD on shots  - pt did clean it at home with hibiclens        Initial /80 (BP Location: Right arm, Cuff Size: Adult Large)  Pulse 76  Temp 98.2  F (36.8  C) (Oral)  Resp 16  Wt 203 lb (92.1 kg)  SpO2 99%  BMI 32.77 kg/m2 Estimated body mass index is 32.77 kg/(m^2) as calculated from the following:    Height as of 9/6/17: 5' 6\" (1.676 m).    Weight as of this encounter: 203 lb (92.1 kg).  Medication Reconciliation: complete      Questioned patient about current smoking habits.  Pt. has never smoked.      Tammie Varghese CMA      "

## 2017-10-21 NOTE — MR AVS SNAPSHOT
After Visit Summary   10/21/2017    Milagro Frye    MRN: [id number]           Patient Information     Date Of Birth          1970        Visit Information        Provider Department      10/21/2017 4:45 PM Delicia Acosta MD [facility] URGENT CARE        Today's Diagnoses     Need for vaccination    -  1    Cat bite of index finger, initial encounter          Care Instructions      Cat Bite    A cat bite can cause a wound deep enough to break the skin. In such cases, the wound is cleaned and then sometimes closed. If the wound is closed it is usually not closed completely. This is so that fluid can drain if the wound becomes infected. Often the wound is left open to heal. In addition to wound care, a tetanus shot may be given, if needed.  Home care    Wash your hands well with soap and warm water before and after caring for the wound. This helps lower the risk of infection.    Care for the wound as directed. If a dressing was applied to the wound, be sure to change it as directed.    If the wound bleeds, place a clean, soft cloth on the wound. Then firmly apply pressure until the bleeding stops. This may take up to 5 minutes. Do not release the pressure and look at the wound during this time.    Always get medical attention for cat bites on the hand. They are highly likely to become infected.    Most wounds heal within 10 days. But an infection can occur even with proper treatment. So be sure to check the wound daily for signs of infection (see below).    Antibiotics may be prescribed. These help prevent or treat infection. If you re given antibiotics, take them as directed. Also be sure to complete the medicines.  Rabies prevention  Rabies is a virus that can be carried in certain animals. These can include domestic animals such as cats and dogs. Pets fully vaccinated against rabies (2 shots) are at very low risk of infection. But because human rabies is almost always fatal, any  biting pet should be confined for 10 days as an extra precaution. In general, if there is a risk for rabies, the following steps may need to be taken:    If someone s pet cat has bitten you, it should be kept in a secure area for the next 10 days to watch for signs of illness. (If the pet owner won t allow this, contact your local animal control center.) If the cat becomes ill or dies during that time, contact your local animal control center at once so the animal may be tested for rabies. If the cat stays healthy for the next 10 days, there is no danger of rabies in the animal or you.    If a stray cat bit you, contact your local animal control center. They can give information on capture, quarantine, and animal rabies testing.    If you can t find the animal that bit you in the next 2 days, and if rabies exists in your area, you may need to receive the rabies vaccine series. Call your healthcare provider right away. Or return to the emergency department promptly.    All animal bites should be reported to the local animal control center. If you were not given a form to fill out, you can report this yourself.  Follow-up care  Follow up with your healthcare provider, or as directed.  When to seek medical advice  Call your healthcare provider right away if any of these occur:    Signs of infection:    Spreading redness or warmth from the wound    Increased pain or swelling    Fever of 100.4 F (38 C) or higher, or as directed by your healthcare provider    Colored fluid or pus draining from the wound    Enlarged lymph nodes above the area that was bitten, such as lymph nodes in the armpit if you were bitten on the hand or arm. This may be a sign of cat-scratch disease (cat-scratch fever).    Signs of rabies infection:    Headache    Confusion    Strange behavior    Increased salivating or drooling    Seizure    Decreased ability to move any body part near the bite area    Bleeding that can't be stopped after 5 minutes  of firm pressure  Date Last Reviewed: 3/23/2015    1254-4308 [employer]. [address] [place] [address], [place] [address]. All rights reserved. This information is not intended as a substitute for professional medical care. Always follow your healthcare professional's instructions.                Follow-ups after your visit        Who to contact     If you have questions or need follow up information about today's clinic visit or your schedule please contact [facility] URGENT [facility]ARE directly at [phone].  Normal or non-critical lab and imaging results will be communicated to you by [employer], letter or phone within 4 business days after the clinic has received the results. If you do not hear from us within 7 days, please contact the clinic through Talkbitst or phone. If you have a critical or abnormal lab result, we will notify you by phone as soon as possible.  Submit refill requests through Skritter or call your pharmacy and they will forward the refill request to us. Please allow 3 business days for your refill to be completed.          Additional Information About Your Visit        [employer] Information     [employer] gives you secure access to your electronic health record. If you see a primary care provider, you can also send messages to your care team and make appointments. If you have questions, please call your primary care clinic.  If you do not have a primary care provider, please call [phone] and they will assist you.        Care EveryWhere ID     This is your Care EveryWhere ID. This could be used by other organizations to access your [facility] medical records  [id number]        Your Vitals Were     Pulse Temperature Respirations Pulse Oximetry BMI (Body Mass Index)       76 98.2  F (36.8  C) (Oral) 16 99% 32.77 kg/m2        Blood Pressure from Last 3 Encounters:   10/21/17 120/80   09/06/17 114/76   08/24/17 112/78    Weight from Last 3 Encounters:   10/21/17 203 lb (92.1 kg)   09/06/17  203 lb 12.8 oz (92.4 kg)   08/24/17 202 lb 3.2 oz (91.7 kg)              We Performed the Following     ADMIN 1st VACCINE     TDAP VACCINE (ADACEL)          Today's Medication Changes          These changes are accurate as of: 10/21/17  5:13 PM.  If you have any questions, ask your nurse or doctor.               Start taking these medicines.        Dose/Directions    amoxicillin-clavulanate 875-125 MG per tablet   Commonly known as:  AUGMENTIN   Used for:  Cat bite of index finger, initial encounter   Started by:  Delicia Acosta MD        Dose:  1 tablet   Take 1 tablet by mouth 2 times daily   Quantity:  20 tablet   Refills:  0            Where to get your medicines      These medications were sent to [facility] [address] [place] [address] 42 AT [facility] [address] & [address], [facility] [address]    Hours:  24-hours Phone:  [phone]     amoxicillin-clavulanate 875-125 MG per tablet                Primary Care Provider Office Phone # Fax #    [facility] [phone] [address]  [place] [address]        Equal Access to Services     EDGARDO VENEGAS AH: Hadii aad ku hadasho Soomaali, waaxda luqadaha, qaybta kaalmada adeegyada, waxay idiin hayaan adeeg aurelia laelvia ah. So [facility] [phone].    ATENCIÓN: Si habla español, tiene a higgins disposición servicios gratuitos de asistencia lingüística. [facility] [phone].    We comply with applicable federal civil rights laws and [place] laws. We do not discriminate on the basis of race, color, national origin, age, disability, sex, sexual orientation, or gender identity.            Thank you!     Thank you for choosing [facility] URGENT CARE  for your care. Our goal is always to provide you with excellent care. Hearing back from our patients is one way we can continue to improve our services. Please take a few minutes to complete the written survey that you may receive in the mail after  your visit with us. Thank you!             Your Updated Medication List - Protect others around you: Learn how to safely use, store and throw away your medicines at www.disposemymeds.org.          This list is accurate as of: 10/21/17  5:13 PM.  Always use your most recent med list.                   Brand Name Dispense Instructions for use Diagnosis    amoxicillin-clavulanate 875-125 MG per tablet    AUGMENTIN    20 tablet    Take 1 tablet by mouth 2 times daily    Cat bite of index finger, initial encounter       cetirizine 10 MG tablet    zyrTEC    30 tablet    Take 1 tablet (10 mg) by mouth every evening        cyclobenzaprine 10 MG tablet    FLEXERIL    30 tablet    Take 0.5-1 tablets (5-10 mg) by mouth 3 times daily as needed for muscle spasms    Right-sided low back pain without sciatica, unspecified chronicity       * DULoxetine 60 MG EC capsule    CYMBALTA    90 capsule    Take 1 capsule (60 mg) by mouth daily    Major depressive disorder, recurrent episode, moderate (H)       * DULoxetine 30 MG EC capsule    CYMBALTA    90 capsule    Take 1 capsule (30 mg) by mouth daily    Major depressive disorder, recurrent episode, moderate (H)       meclizine 25 MG tablet    ANTIVERT    30 tablet    Take 1 tablet (25 mg) by mouth every 6 hours as needed for dizziness    BPPV (benign paroxysmal positional vertigo), left       nitroGLYcerin 0.1 MG/HR 24 hr patch    NITRODUR          ondansetron 8 MG ODT tab    ZOFRAN ODT    20 tablet    Take 1 tablet (8 mg) by mouth every 8 hours as needed for nausea    BPPV (benign paroxysmal positional vertigo), left       TYLENOL PO           * Notice:  This list has 2 medication(s) that are the same as other medications prescribed for you. Read the directions carefully, and ask your doctor or other care provider to review them with you.
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
## 2017-10-21 NOTE — PATIENT INSTRUCTIONS
Cat Bite    A cat bite can cause a wound deep enough to break the skin. In such cases, the wound is cleaned and then sometimes closed. If the wound is closed it is usually not closed completely. This is so that fluid can drain if the wound becomes infected. Often the wound is left open to heal. In addition to wound care, a tetanus shot may be given, if needed.  Home care    Wash your hands well with soap and warm water before and after caring for the wound. This helps lower the risk of infection.    Care for the wound as directed. If a dressing was applied to the wound, be sure to change it as directed.    If the wound bleeds, place a clean, soft cloth on the wound. Then firmly apply pressure until the bleeding stops. This may take up to 5 minutes. Do not release the pressure and look at the wound during this time.    Always get medical attention for cat bites on the hand. They are highly likely to become infected.    Most wounds heal within 10 days. But an infection can occur even with proper treatment. So be sure to check the wound daily for signs of infection (see below).    Antibiotics may be prescribed. These help prevent or treat infection. If you re given antibiotics, take them as directed. Also be sure to complete the medicines.  Rabies prevention  Rabies is a virus that can be carried in certain animals. These can include domestic animals such as cats and dogs. Pets fully vaccinated against rabies (2 shots) are at very low risk of infection. But because human rabies is almost always fatal, any biting pet should be confined for 10 days as an extra precaution. In general, if there is a risk for rabies, the following steps may need to be taken:    If someone s pet cat has bitten you, it should be kept in a secure area for the next 10 days to watch for signs of illness. (If the pet owner won t allow this, contact your local animal control center.) If the cat becomes ill or dies during that time, contact your  local animal control center at once so the animal may be tested for rabies. If the cat stays healthy for the next 10 days, there is no danger of rabies in the animal or you.    If a stray cat bit you, contact your local animal control center. They can give information on capture, quarantine, and animal rabies testing.    If you can t find the animal that bit you in the next 2 days, and if rabies exists in your area, you may need to receive the rabies vaccine series. Call your healthcare provider right away. Or return to the emergency department promptly.    All animal bites should be reported to the local animal control center. If you were not given a form to fill out, you can report this yourself.  Follow-up care  Follow up with your healthcare provider, or as directed.  When to seek medical advice  Call your healthcare provider right away if any of these occur:    Signs of infection:    Spreading redness or warmth from the wound    Increased pain or swelling    Fever of 100.4 F (38 C) or higher, or as directed by your healthcare provider    Colored fluid or pus draining from the wound    Enlarged lymph nodes above the area that was bitten, such as lymph nodes in the armpit if you were bitten on the hand or arm. This may be a sign of cat-scratch disease (cat-scratch fever).    Signs of rabies infection:    Headache    Confusion    Strange behavior    Increased salivating or drooling    Seizure    Decreased ability to move any body part near the bite area    Bleeding that can't be stopped after 5 minutes of firm pressure  Date Last Reviewed: 3/23/2015    3692-1785 [employer]. [address] [place] [address], [place] [address]. All rights reserved. This information is not intended as a substitute for professional medical care. Always follow your healthcare professional's instructions.

## 2017-10-22 ENCOUNTER — HOSPITAL ENCOUNTER (EMERGENCY)
Facility: CLINIC | Age: 47
Discharge: HOME | End: 2017-10-22
Attending: EMERGENCY MEDICINE | Admitting: EMERGENCY MEDICINE
Payer: COMMERCIAL

## 2017-10-22 VITALS
HEART RATE: 105 BPM | DIASTOLIC BLOOD PRESSURE: 79 MMHG | SYSTOLIC BLOOD PRESSURE: 124 MMHG | OXYGEN SATURATION: 100 % | TEMPERATURE: 97.2 F | RESPIRATION RATE: 16 BRPM
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
## 2017-10-22 LAB
ANION GAP SERPL CALCULATED.3IONS-SCNC: 5 MMOL/L (ref 3–14)
BASOPHILS # BLD AUTO: 0 10E9/L (ref 0–0.2)
BASOPHILS NFR BLD AUTO: 0.3 %
BUN SERPL-MCNC: 14 MG/DL (ref 7–30)
CALCIUM SERPL-MCNC: 8.7 MG/DL (ref 8.5–10.1)
CHLORIDE SERPL-SCNC: 106 MMOL/L (ref 94–109)
CO2 SERPL-SCNC: 28 MMOL/L (ref 20–32)
CREAT SERPL-MCNC: 0.7 MG/DL (ref 0.52–1.04)
DIFFERENTIAL METHOD BLD: NORMAL
EOSINOPHIL # BLD AUTO: 0.2 10E9/L (ref 0–0.7)
EOSINOPHIL NFR BLD AUTO: 2.3 %
ERYTHROCYTE [DISTWIDTH] IN BLOOD BY AUTOMATED COUNT: 11.7 % (ref 10–15)
GFR SERPL CREATININE-BSD FRML MDRD: 90 ML/MIN/1.7M2
GLUCOSE SERPL-MCNC: 99 MG/DL (ref 70–99)
HCT VFR BLD AUTO: 39.8 % (ref 35–47)
HGB BLD-MCNC: 13.4 G/DL (ref 11.7–15.7)
IMM GRANULOCYTES # BLD: 0 10E9/L (ref 0–0.4)
IMM GRANULOCYTES NFR BLD: 0.1 %
LYMPHOCYTES # BLD AUTO: 1.8 10E9/L (ref 0.8–5.3)
LYMPHOCYTES NFR BLD AUTO: 24.9 %
MCH RBC QN AUTO: 30 PG (ref 26.5–33)
MCHC RBC AUTO-ENTMCNC: 33.7 G/DL (ref 31.5–36.5)
MCV RBC AUTO: 89 FL (ref 78–100)
MONOCYTES # BLD AUTO: 0.6 10E9/L (ref 0–1.3)
MONOCYTES NFR BLD AUTO: 7.9 %
NEUTROPHILS # BLD AUTO: 4.7 10E9/L (ref 1.6–8.3)
NEUTROPHILS NFR BLD AUTO: 64.5 %
NRBC # BLD AUTO: 0 10*3/UL
NRBC BLD AUTO-RTO: 0 /100
PLATELET # BLD AUTO: 204 10E9/L (ref 150–450)
POTASSIUM SERPL-SCNC: 3.7 MMOL/L (ref 3.4–5.3)
RBC # BLD AUTO: 4.46 10E12/L (ref 3.8–5.2)
SODIUM SERPL-SCNC: 139 MMOL/L (ref 133–144)
WBC # BLD AUTO: 7.2 10E9/L (ref 4–11)

## 2017-10-22 PROCEDURE — 99284 EMERGENCY DEPT VISIT MOD MDM: CPT | Mod: 25

## 2017-10-22 PROCEDURE — 87040 BLOOD CULTURE FOR BACTERIA: CPT | Performed by: EMERGENCY MEDICINE

## 2017-10-22 PROCEDURE — 25000128 H RX IP 250 OP 636: Performed by: EMERGENCY MEDICINE

## 2017-10-22 PROCEDURE — 25000132 ZZH RX MED GY IP 250 OP 250 PS 637: Performed by: EMERGENCY MEDICINE

## 2017-10-22 PROCEDURE — 85025 COMPLETE CBC W/AUTO DIFF WBC: CPT | Performed by: EMERGENCY MEDICINE

## 2017-10-22 PROCEDURE — 76882 US LMTD JT/FCL EVL NVASC XTR: CPT | Mod: LT

## 2017-10-22 PROCEDURE — 80048 BASIC METABOLIC PNL TOTAL CA: CPT | Performed by: EMERGENCY MEDICINE

## 2017-10-22 PROCEDURE — 96365 THER/PROPH/DIAG IV INF INIT: CPT

## 2017-10-22 PROCEDURE — 96366 THER/PROPH/DIAG IV INF ADDON: CPT

## 2017-10-22 RX ORDER — HYDROCODONE BITARTRATE AND ACETAMINOPHEN 5; 325 MG/1; MG/1
1-2 TABLET ORAL EVERY 4 HOURS PRN
Qty: 10 TABLET | Refills: 0 | Status: SHIPPED | OUTPATIENT
Start: 2017-10-22 | End: 2019-08-29

## 2017-10-22 RX ORDER — CLINDAMYCIN HCL 300 MG
300 CAPSULE ORAL 4 TIMES DAILY
Qty: 28 CAPSULE | Refills: 0 | Status: SHIPPED | OUTPATIENT
Start: 2017-10-22 | End: 2017-10-29

## 2017-10-22 RX ORDER — ACETAMINOPHEN 325 MG/1
650 TABLET ORAL ONCE
Status: COMPLETED | OUTPATIENT
Start: 2017-10-22 | End: 2017-10-22

## 2017-10-22 RX ORDER — LEVOFLOXACIN 500 MG/1
500 TABLET, FILM COATED ORAL DAILY
Qty: 7 TABLET | Refills: 0 | Status: SHIPPED | OUTPATIENT
Start: 2017-10-22 | End: 2017-10-26

## 2017-10-22 RX ADMIN — TAZOBACTAM SODIUM AND PIPERACILLIN SODIUM 3.38 G: 375; 3 INJECTION, SOLUTION INTRAVENOUS at 10:25

## 2017-10-22 RX ADMIN — ACETAMINOPHEN 650 MG: 325 TABLET, FILM COATED ORAL at 11:41

## 2017-10-22 ASSESSMENT — ENCOUNTER SYMPTOMS
WOUND: 1
COLOR CHANGE: 1

## 2017-10-22 NOTE — ED PROVIDER NOTES
History     Chief Complaint:  Cat Bite      HPI   Milagro Frye is a 47 year old female who presents with a cat bite. Yesterday morning she reached in between the dog and cat and she sustained a puncture wound to the left pointer finger. She also endorses purulent drainage. She went to Urgent Care at 1700 yesterday, and they prescribed Augmentin and told her if she had a fever or increasing redness she should return to the ED. She notes a fever of 100 last night and that the area is redder this morning. She notes this has happened before when she was 13.     Allergies:   Sporanox  Aspartame  Bee venom  Coconut oil  Indomethacin  Meperidine  Metronidazole     Medications:    Augmentin  Cymbalta  Flexeril  Antivert  Zofran  Nitroglycerin  Zyrtec  Tylenol     Past Medical History:    Allergic rhinitis  Cataracts  Depression  Complications with anesthesia  Herpes zoster  Migraines  Renal calculus     Past Surgical History:    C-sections  Hysterectomy  Arthroscopic surgery, both knees  Tonsillectomy  Martha teeth  Tubal ligation  Cyst removed from back of thigh    Family History:    HTN  Liver abscess  Hypothyroidism  Depression  Anxiety  Cancer    Social History:  Marital Status:   Presents to the ED with her   Tobacco Use: Never  Alcohol Use: Infrequently   PCP: [facility]     Review of Systems   Skin: Positive for color change and wound.   All other systems reviewed and are negative.      Physical Exam   First Vitals:  BP: 124/79  Pulse: 105  Temp: 97.2  F (36.2  C)  Resp: 16  SpO2: 100 %      Physical Exam  General: Patient is alert and interactive when I enter the room  Head:  The scalp, face, and head appear normal  Eyes:  Conjunctivae are normal  ENT:    The nose is normal    Pinnae are normal    External acoustic canals are normal  Neck:  Trachea midline  CV:  Pulses are normal.    Resp:  No respiratory distress   Abdomen:      Soft, non-tender, non-distended  Musc:  Normal  muscular tone    No major joint effusions    No asymmetric leg swelling    Good capillary refill noted  Skin:  Puncture wound to left pointer finger. 1-2 mm proximal to PIP. Erythema extending   from dorsum middle phalanx to mid dorsum of hand. No tenderness along flexor   sheath. No pain with passive extension. No diffuse swelling as it is just localized to the   dorsum of the finger. Flexion and extension intact without pain. No rash or lesions noted. Sensation intact.   Neuro: Speech is normal and fluent. Face is symmetric.     Moving all extremities well.   Psych: Awake. Alert.  Normal affect.  Appropriate interactions.      Emergency Department Course     Procedures:      Limited Bedside Screening Ultrasound     PERFORMED BY: Dr. Santos  BODY AREA IMAGED: Left pointer finger  INDICATIONS: Cat bite  FINDINGS: Cellulitis    Laboratory:  CBC:  WBC 7.2, HGB 13.4, , otherwise WNL  BMP: WNL (Creatinine 0.70)  Blood culture: Pending    Interventions:  1025: Zosyn, 3.375 g, IV injection  1141: Tylenol, 650 mg, oral     Emergency Department Course:  Nursing notes and vitals reviewed.  I performed an exam of the patient as documented above.  The above workup was undertaken.  1038: I performed the bedside US; see procedure note above.   1126: I consulted with Dr. Martinez of orthopedics.  1129: I rechecked the patient and discussed results.  1229: I rechecked the patient and discussed results.    Findings and plan explained to the Patient. Patient discharged home, status improved, with instructions regarding supportive care, medications, and reasons to return as well as the importance of close follow-up was reviewed. Patient was prescribed clindamycin, Levaquin and Norco.    Impression & Plan      Medical Decision Making:  Milagro Frye is a 47 year old female presents with a cat bite. Vitals were unremarkable except for mild tachycardia. Exam revealed erythematous bite on the dorsum of her left hand  involving her second finger. There was a puncture would just proximal to the PIP not on the PIP itself. She has no signs of flexor tenosynovitis which is the main concern for her hand. She does have worsening erythema of her hand despite Augmentin. I did an US. I couldn't see any fluctuance or area that would require drainage. I touched base with hand surgery. They recommended marking the hand by and thinks it's reasonable for her to do warm soaks, antibiotics and splinting to help with decreased moving of the hand. She should then follow up with hand surgery clinic. I gave her IV Zosyn as she possibly failed Augmentin and should take a stronger antibiotic. IV Zosyn would be broader than Unasyn. We did an outline of the erythema and placed her in a position of comfort on the splint and gave her Dr. Martinez's number to follow up with hand surgery. I do not think she flexor tenosynovitis, seeing as she has none of the classic signs for this. Puncture wound on the dorsum of the hand is proximal to the joint so I do not think it has yet involved the volar aspect of the finger. However, it is possible so I gave her careful return precautions. I changed her antibiotics to clindamycin and levofloxacin and gave her pain medications and care return precautions.     Diagnosis:    ICD-10-CM    1. Cat bite, initial encounter W55.01XA        Disposition:  Discharged to home.     Discharge Medications:  New Prescriptions    CLINDAMYCIN (CLEOCIN) 300 MG CAPSULE    Take 1 capsule (300 mg) by mouth 4 times daily for 7 days    HYDROCODONE-ACETAMINOPHEN (NORCO) 5-325 MG PER TABLET    Take 1-2 tablets by mouth every 4 hours as needed for moderate to severe pain    LEVOFLOXACIN (LEVAQUIN) 500 MG TABLET    Take 1 tablet (500 mg) by mouth daily for 7 days       Vaishali YOUNG, am serving as a scribe on 10/22/2017 at 9:49 AM to personally document services performed by Dr. Santos based on my observations and the provider's statements  to me.   [facility] EMERGENCY DEPARTMENT       Jennifer Santos MD  10/22/17 2053

## 2017-10-22 NOTE — ED AVS SNAPSHOT
[facility] Emergency Department    [address]    [facility] [address]    Phone:  [phone]    Fax:  [phone]                                       Milagro Frye   MRN: [id number]    Department:  [facility] Emergency Department   Date of Visit:  10/22/2017           After Visit Summary Signature Page     I have received my discharge instructions, and my questions have been answered. I have discussed any challenges I see with this plan with the nurse or doctor.    ..........................................................................................................................................  Patient/Patient Representative Signature      ..........................................................................................................................................  Patient Representative Print Name and Relationship to Patient    ..................................................               ................................................  Date                                            Time    ..........................................................................................................................................  Reviewed by Signature/Title    ...................................................              ..............................................  Date                                                            Time

## 2017-10-22 NOTE — ED NOTES
Pt arrives to ED with cat bite to right hand she was attempting to break fight between her cat and dog, pt reports she was seen in UC taken 2 doses of augmentin but redness, pain and swelling in worsening. CMS intact. A/ox 3, ABC's intact.

## 2017-10-22 NOTE — ED AVS SNAPSHOT
[facility] Emergency Department    [address]    [facility] [address]    Phone:  [phone]    Fax:  [phone]                                       Milagro Frye   MRN: [id number]    Department:  [facility] Emergency Department   Date of Visit:  10/22/2017           Patient Information     Date Of Birth          1970        Your diagnoses for this visit were:     Cat bite, initial encounter        You were seen by Jennifer Santos MD.      Follow-up Information     Follow up with Jen Martinez MD In 1 day.    Specialty:  Orthopedics    Contact information:    [facility]  [address] [address]  [phone]          Follow up with [facility] Emergency Department.    Specialty:  EMERGENCY MEDICINE    Why:  If symptoms worsen    Contact information:    [address]  [facility] [id number] [phone]        Discharge Instructions       Warm soaks 20 minutes after taking antibiotics.   Discharge Instructions  Cellulitis    Cellulitis is an infection of the skin that occurs when bacteria enter the skin.   Symptoms are generally redness, swelling, warmth and pain.  Your infection appeared to be appropriate to treat at home with antibiotics.  However, sometimes your infection may be worse than it seemed at first, or may worsen with time. If you have new or worse symptoms, you may need to be seen again in the Emergency Department or by your primary doctor.    Return to the Emergency Department if:    The redness, pain, or swelling gets a lot worse.  If the red area was marked, return if it is red beyond the marked area.    You are unable to get your antibiotics, or are vomiting them up or you can t take them.    You are feeling more ill, weak or lightheaded.    You start to run a new fever (temperature >101).    Anything else about the infection worries or concerns you.  Treatment:    Start your  "antibiotics right away, and take them as prescribed. Be sure to finish the whole prescription, even if you are better.    Apply a heating pad, warm packs, or warm water soaks to the infected area for 15 minutes at a time, at least 3 times a day. Do not use a heating pad on your feet or legs if you have diabetes. Do not sleep with a heating pad on, since this can cause burns or skin injury.    Rest your injured area for at least 1-2 days. After that you may start using your extremity again as long as there is not too much pain.     Raise the injured area above the level of your heart as much as possible in the first 1-2 days.    Tylenol  (acetaminophen), Motrin  (ibuprofen), or Advil  (ibuprofen) may help may help reduce pain and fever and may help you feel more comfortable. Be sure to read and follow the package directions, and ask your doctor if you have questions.    Follow-up with your doctor:    Re-check in clinic within 2-3 days.  Probiotics: If you have been given an antibiotic, you may want to also take a probiotic pill or eat yogurt with live cultures. Probiotics have \"good bacteria\" to help your intestines stay healthy. Studies have shown that probiotics help prevent diarrhea and other intestine problems (including C. diff infection) when you take antibiotics. You can buy these without a prescription in the pharmacy section of the store.     If you were given a prescription for medicine here today, be sure to read all of the information (including the package insert) that comes with your prescription.  This will include important information about the medicine, its side effects, and any warnings that you need to know about.  The pharmacist who fills the prescription can provide more information and answer questions you may have about the medicine.  If you have questions or concerns that the pharmacist cannot address, please call or return to the Emergency Department.     Opioid Medication " Information    Pain medications are among the most commonly prescribed medicines, so we are including this information for all our patients. If you did not receive pain medication or get a prescription for pain medicine, you can ignore it.     You may have been given a prescription for an opioid (narcotic) pain medicine and/or have received a pain medicine while here in the Emergency Department. These medicines can make you drowsy or impaired. You must not drive, operate dangerous equipment, or engage in any other dangerous activities while taking these medications. If you drive while taking these medications, you could be arrested for DUI, or driving under the influence. Do not drink any alcohol while you are taking these medications.     Opioid pain medications can cause addiction. If you have a history of chemical dependency of any type, you are at a higher risk of becoming addicted to pain medications.  Only take these prescribed medications to treat your pain when all other options have been tried. Take it for as short a time and as few doses as possible. Store your pain pills in a secure place, as they are frequently stolen and provide a dangerous opportunity for children or visitors in your house to start abusing these powerful medications. We will not replace any lost or stolen medicine.  As soon as your pain is better, you should flush all your remaining medication.     Many prescription pain medications contain Tylenol  (acetaminophen), including Vicodin , Tylenol #3 , Norco , Lortab , and Percocet .  You should not take any extra pills of Tylenol  if you are using these prescription medications or you can get very sick.  Do not ever take more than 3000 mg of acetaminophen in any 24 hour period.    All opioids tend to cause constipation. Drink plenty of water and eat foods that have a lot of fiber, such as fruits, vegetables, prune juice, apple juice and high fiber cereal.  Take a laxative if you don t  move your bowels at least every other day. Miralax , Milk of Magnesia, Colace , or Senna  can be used to keep you regular.      Remember that you can always come back to the Emergency Department if you are not able to see your regular doctor in the amount of time listed above, if you get any new symptoms, or if there is anything that worries you.      24 Hour Appointment Hotline       To make an appointment at any [facility], call [phone] ([phone]). If you don't have a family doctor or clinic, we will help you find one. [facility] are conveniently located to serve the needs of you and your family.             Review of your medicines      START taking        Dose / Directions Last dose taken    clindamycin 300 MG capsule   Commonly known as:  CLEOCIN   Dose:  300 mg   Quantity:  28 capsule        Take 1 capsule (300 mg) by mouth 4 times daily for 7 days   Refills:  0        HYDROcodone-acetaminophen 5-325 MG per tablet   Commonly known as:  NORCO   Dose:  1-2 tablet   Quantity:  10 tablet        Take 1-2 tablets by mouth every 4 hours as needed for moderate to severe pain   Refills:  0        levofloxacin 500 MG tablet   Commonly known as:  LEVAQUIN   Dose:  500 mg   Quantity:  7 tablet        Take 1 tablet (500 mg) by mouth daily for 7 days   Refills:  0          Our records show that you are taking the medicines listed below. If these are incorrect, please call your family doctor or clinic.        Dose / Directions Last dose taken    amoxicillin-clavulanate 875-125 MG per tablet   Commonly known as:  AUGMENTIN   Dose:  1 tablet   Quantity:  20 tablet        Take 1 tablet by mouth 2 times daily   Refills:  0        cetirizine 10 MG tablet   Commonly known as:  zyrTEC   Dose:  10 mg   Quantity:  30 tablet        Take 1 tablet (10 mg) by mouth every evening   Refills:  1        cyclobenzaprine 10 MG tablet   Commonly known as:  FLEXERIL   Dose:  5-10 mg   Quantity:  30 tablet        Take  0.5-1 tablets (5-10 mg) by mouth 3 times daily as needed for muscle spasms   Refills:  1        * DULoxetine 60 MG EC capsule   Commonly known as:  CYMBALTA   Dose:  60 mg   Quantity:  90 capsule        Take 1 capsule (60 mg) by mouth daily   Refills:  1        * DULoxetine 30 MG EC capsule   Commonly known as:  CYMBALTA   Dose:  30 mg   Quantity:  90 capsule        Take 1 capsule (30 mg) by mouth daily   Refills:  1        meclizine 25 MG tablet   Commonly known as:  ANTIVERT   Dose:  25 mg   Quantity:  30 tablet        Take 1 tablet (25 mg) by mouth every 6 hours as needed for dizziness   Refills:  1        nitroGLYcerin 0.1 MG/HR 24 hr patch   Commonly known as:  NITRODUR        Refills:  0        ondansetron 8 MG ODT tab   Commonly known as:  ZOFRAN ODT   Dose:  8 mg   Quantity:  20 tablet        Take 1 tablet (8 mg) by mouth every 8 hours as needed for nausea   Refills:  1        TYLENOL PO        Refills:  0        * Notice:  This list has 2 medication(s) that are the same as other medications prescribed for you. Read the directions carefully, and ask your doctor or other care provider to review them with you.            Prescriptions were sent or printed at these locations (3 Prescriptions)                   Other Prescriptions                Printed at Department/Unit printer (3 of 3)         levofloxacin (LEVAQUIN) 500 MG tablet               clindamycin (CLEOCIN) 300 MG capsule               HYDROcodone-acetaminophen (NORCO) 5-325 MG per tablet                Procedures and tests performed during your visit     Basic metabolic panel    Blood culture ONE site    CBC with platelets differential    POC US SOFT TISSUE      Orders Needing Specimen Collection     None      Pending Results     Date and Time Order Name Status Description    10/22/2017 1034 POC US SOFT TISSUE In process     10/22/2017 0954 Blood culture ONE site In process             Pending Culture Results     Date and Time Order Name Status  Description    10/22/2017 0954 Blood culture ONE site In process             Pending Results Instructions     If you had any lab results that were not finalized at the time of your Discharge, you can call the ED Lab Result RN at [phone]. You will be contacted by this team for any positive Lab results or changes in treatment. The nurses are available 7 days a week from 10A to 6:30P.  You can leave a message 24 hours per day and they will return your call.        Test Results From Your Hospital Stay        10/22/2017 10:37 AM      Component Results     Component Value Ref Range & Units Status    WBC 7.2 4.0 - 11.0 10e9/L Final    RBC Count 4.46 3.8 - 5.2 10e12/L Final    Hemoglobin 13.4 11.7 - 15.7 g/dL Final    Hematocrit 39.8 35.0 - 47.0 % Final    MCV 89 78 - 100 fl Final    MCH 30.0 26.5 - 33.0 pg Final    MCHC 33.7 31.5 - 36.5 g/dL Final    RDW 11.7 10.0 - 15.0 % Final    Platelet Count 204 150 - 450 10e9/L Final    Diff Method Automated Method  Final    % Neutrophils 64.5 % Final    % Lymphocytes 24.9 % Final    % Monocytes 7.9 % Final    % Eosinophils 2.3 % Final    % Basophils 0.3 % Final    % Immature Granulocytes 0.1 % Final    Nucleated RBCs 0 0 /100 Final    Absolute Neutrophil 4.7 1.6 - 8.3 10e9/L Final    Absolute Lymphocytes 1.8 0.8 - 5.3 10e9/L Final    Absolute Monocytes 0.6 0.0 - 1.3 10e9/L Final    Absolute Eosinophils 0.2 0.0 - 0.7 10e9/L Final    Absolute Basophils 0.0 0.0 - 0.2 10e9/L Final    Abs Immature Granulocytes 0.0 0 - 0.4 10e9/L Final    Absolute Nucleated RBC 0.0  Final         10/22/2017 10:51 AM      Component Results     Component Value Ref Range & Units Status    Sodium 139 133 - 144 mmol/L Final    Potassium 3.7 3.4 - 5.3 mmol/L Final    Chloride 106 94 - 109 mmol/L Final    Carbon Dioxide 28 20 - 32 mmol/L Final    Anion Gap 5 3 - 14 mmol/L Final    Glucose 99 70 - 99 mg/dL Final    Urea Nitrogen 14 7 - 30 mg/dL Final    Creatinine 0.70 0.52 - 1.04 mg/dL Final    GFR Estimate  90 >60 mL/min/1.7m2 Final    Non  GFR Calc    GFR Estimate If Black >90 >60 mL/min/1.7m2 Final    African American GFR Calc    Calcium 8.7 8.5 - 10.1 mg/dL Final         10/22/2017 10:35 AM         10/22/2017 10:34 AM      Result not yet available     Exam Begun                Clinical Quality Measure: Blood Pressure Screening     Your blood pressure was checked while you were in the emergency department today. The last reading we obtained was  BP: 124/79 . Please read the guidelines below about what these numbers mean and what you should do about them.  If your systolic blood pressure (the top number) is less than 120 and your diastolic blood pressure (the bottom number) is less than 80, then your blood pressure is normal. There is nothing more that you need to do about it.  If your systolic blood pressure (the top number) is 120-139 or your diastolic blood pressure (the bottom number) is 80-89, your blood pressure may be higher than it should be. You should have your blood pressure rechecked within a year by a primary care provider.  If your systolic blood pressure (the top number) is 140 or greater or your diastolic blood pressure (the bottom number) is 90 or greater, you may have high blood pressure. High blood pressure is treatable, but if left untreated over time it can put you at risk for heart attack, stroke, or kidney failure. You should have your blood pressure rechecked by a primary care provider within the next 4 weeks.  If your provider in the emergency department today gave you specific instructions to follow-up with your doctor or provider even sooner than that, you should follow that instruction and not wait for up to 4 weeks for your follow-up visit.        Thank you for choosing [facility]       Thank you for choosing [facility] for your care. Our goal is always to provide you with excellent care. Hearing back from our patients is one way we can continue to improve our services. Please  take a few minutes to complete the written survey that you may receive in the mail after you visit with us. Thank you!        [employer] Information     [employer] gives you secure access to your electronic health record. If you see a primary care provider, you can also send messages to your care team and make appointments. If you have questions, please call your primary care clinic.  If you do not have a primary care provider, please call [phone] and they will assist you.        Care EveryWhere ID     This is your Care EveryWhere ID. This could be used by other organizations to access your [place] medical records  [id number]        Equal Access to Services     [facility] : Ivan Dumont, ofelia ha, lilibeth hudson, rj guillaume. So [facility] [phone].    ATENCIÓN: Si habla español, tiene a higgins disposición servicios gratuitos de asistencia lingüística. Jankiame al [phone].    We comply with applicable federal civil rights laws and [place] laws. We do not discriminate on the basis of race, color, national origin, age, disability, sex, sexual orientation, or gender identity.            After Visit Summary       This is your record. Keep this with you and show to your community pharmacist(s) and doctor(s) at your next visit.

## 2017-10-22 NOTE — DISCHARGE INSTRUCTIONS
Warm soaks 20 minutes after taking antibiotics.   Discharge Instructions  Cellulitis    Cellulitis is an infection of the skin that occurs when bacteria enter the skin.   Symptoms are generally redness, swelling, warmth and pain.  Your infection appeared to be appropriate to treat at home with antibiotics.  However, sometimes your infection may be worse than it seemed at first, or may worsen with time. If you have new or worse symptoms, you may need to be seen again in the Emergency Department or by your primary doctor.    Return to the Emergency Department if:    The redness, pain, or swelling gets a lot worse.  If the red area was marked, return if it is red beyond the marked area.    You are unable to get your antibiotics, or are vomiting them up or you can t take them.    You are feeling more ill, weak or lightheaded.    You start to run a new fever (temperature >101).    Anything else about the infection worries or concerns you.  Treatment:    Start your antibiotics right away, and take them as prescribed. Be sure to finish the whole prescription, even if you are better.    Apply a heating pad, warm packs, or warm water soaks to the infected area for 15 minutes at a time, at least 3 times a day. Do not use a heating pad on your feet or legs if you have diabetes. Do not sleep with a heating pad on, since this can cause burns or skin injury.    Rest your injured area for at least 1-2 days. After that you may start using your extremity again as long as there is not too much pain.     Raise the injured area above the level of your heart as much as possible in the first 1-2 days.    Tylenol  (acetaminophen), Motrin  (ibuprofen), or Advil  (ibuprofen) may help may help reduce pain and fever and may help you feel more comfortable. Be sure to read and follow the package directions, and ask your doctor if you have questions.    Follow-up with your doctor:    Re-check in clinic within 2-3 days.  Probiotics: If you have  "been given an antibiotic, you may want to also take a probiotic pill or eat yogurt with live cultures. Probiotics have \"good bacteria\" to help your intestines stay healthy. Studies have shown that probiotics help prevent diarrhea and other intestine problems (including C. diff infection) when you take antibiotics. You can buy these without a prescription in the pharmacy section of the store.     If you were given a prescription for medicine here today, be sure to read all of the information (including the package insert) that comes with your prescription.  This will include important information about the medicine, its side effects, and any warnings that you need to know about.  The pharmacist who fills the prescription can provide more information and answer questions you may have about the medicine.  If you have questions or concerns that the pharmacist cannot address, please call or return to the Emergency Department.     Opioid Medication Information    Pain medications are among the most commonly prescribed medicines, so we are including this information for all our patients. If you did not receive pain medication or get a prescription for pain medicine, you can ignore it.     You may have been given a prescription for an opioid (narcotic) pain medicine and/or have received a pain medicine while here in the Emergency Department. These medicines can make you drowsy or impaired. You must not drive, operate dangerous equipment, or engage in any other dangerous activities while taking these medications. If you drive while taking these medications, you could be arrested for DUI, or driving under the influence. Do not drink any alcohol while you are taking these medications.     Opioid pain medications can cause addiction. If you have a history of chemical dependency of any type, you are at a higher risk of becoming addicted to pain medications.  Only take these prescribed medications to treat your pain when all " other options have been tried. Take it for as short a time and as few doses as possible. Store your pain pills in a secure place, as they are frequently stolen and provide a dangerous opportunity for children or visitors in your house to start abusing these powerful medications. We will not replace any lost or stolen medicine.  As soon as your pain is better, you should flush all your remaining medication.     Many prescription pain medications contain Tylenol  (acetaminophen), including Vicodin , Tylenol #3 , Norco , Lortab , and Percocet .  You should not take any extra pills of Tylenol  if you are using these prescription medications or you can get very sick.  Do not ever take more than 3000 mg of acetaminophen in any 24 hour period.    All opioids tend to cause constipation. Drink plenty of water and eat foods that have a lot of fiber, such as fruits, vegetables, prune juice, apple juice and high fiber cereal.  Take a laxative if you don t move your bowels at least every other day. Miralax , Milk of Magnesia, Colace , or Senna  can be used to keep you regular.      Remember that you can always come back to the Emergency Department if you are not able to see your regular doctor in the amount of time listed above, if you get any new symptoms, or if there is anything that worries you.

## 2017-10-22 NOTE — LETTER
To Whom it may concern:      Milagro Frye was seen in our Emergency Department today, 10/22/17.  I expect her condition to improve over the next 3 days.  She may return to work/school when improved.    Sincerely,        Jerrell Mcintosh RN

## 2017-10-25 ENCOUNTER — TELEPHONE (OUTPATIENT)
Dept: FAMILY MEDICINE | Facility: CLINIC | Age: 47
End: 2017-10-25

## 2017-10-25 ASSESSMENT — PATIENT HEALTH QUESTIONNAIRE - PHQ9: SUM OF ALL RESPONSES TO PHQ QUESTIONS 1-9: 22

## 2017-10-25 NOTE — TELEPHONE ENCOUNTER
Per Huddle with [facility]-patient should be seen.  Appt tomorrow at 8:40.  Patient will go to ED PRN- prior to appt  Wendy Weaver RN- Triage [employer]

## 2017-10-25 NOTE — PROGRESS NOTES
SUBJECTIVE:   Milagro Frye is a 47 year old female who presents to clinic today for the following health issues:    ED/UC Followup:    Facility:  [facility]   Date of visit: 10/22/17  Reason for visit: Cat Bite   Current Status: redness and swelling gone , but still having fevers ( last night 101.2 ,100.6 )  During the early part of day no fevers but by mid afternoon it spikes up and pain in the joint ,   Not taking Levaquin 500 mg due to having suicidal ideas     Taking only clindamycin but forgot to take it this morning          [facility] Admission on 10/22/17:    Pt was seen at [facility] on 10/22/17 secondary to sustaining a puncture wound from a cat bite on her left pointer finger. US was done that did not show any fluctuance or area that would require drainage. Pt was given IV Zosyn, and put in a splint. Pt did not show any symptoms of flexor tenosynovitis at the ER. Pt was put on clindamycin and levofloxacin, as well as put on Norco upon discharge. Of note, pt was seen on 10/21/17 by UC and was prescribed Augmentin, was told to follow up with ED if she developed a fever, or increasing redness in wound area.      Today's visit (10/26/17):    Pt notes redness and swelling in her wound area have both decreased. She is here today because of symptoms she is experiencing with Levaquin. She discontinued Levaquin due to experiencing suicidal ideations, spent all day yesterday thinking about ways to kill herself, she did not have these symptoms before being prescribed Levaquin, her depression was well manageable before being on Levaquin. She is also still experiencing joint pain in her left pointer finger. Last night she had fever of 101.2 and 100.6. No chest pain, SOB, or cough. She feels achy. Pt was given 2 doses of Augmentin by .        Problem list and histories reviewed & adjusted, as indicated.  Additional history: as documented    Reviewed and updated as needed this visit by  "clinical staff  Tobacco  Allergies  Meds  Med Hx  Surg Hx  Fam Hx  Soc Hx      Reviewed and updated as needed this visit by Provider       ROS:  Constitutional, HEENT, cardiovascular, pulmonary, gi and gu systems are negative, except as otherwise noted.    This document serves as a record of the services and decisions personally performed and made by Karen Weiler, MD. It was created on her behalf by Frank Barry, a trained medical scribe. The creation of this document is based on the provider's statements to the medical scribe.  Frank Barry 9:54 AM October 26, 2017    OBJECTIVE:   /70  Pulse 101  Temp 98.5  F (36.9  C) (Oral)  Ht 1.676 m (5' 6\")  Wt 90.7 kg (200 lb)  SpO2 97%  BMI 32.28 kg/m2  Body mass index is 32.28 kg/(m^2).  GENERAL: healthy, alert and no distress  EYES: Eyes grossly normal to inspection, PERRL and conjunctivae and sclerae normal  HENT: ear canals and TM's normal, nose and mouth without ulcers or lesions  NECK: no adenopathy, no asymmetry, masses, or scars and thyroid normal to palpation  RESP: lungs clear to auscultation - no rales, rhonchi or wheezes  CV: regular rate and rhythm, normal S1 S2, no S3 or S4, no murmur, click or rub, no peripheral edema and peripheral pulses strong  ABDOMEN: soft, nontender, no hepatosplenomegaly, no masses and bowel sounds normal  MS: puncture wound below the left index finger PIP joint, no drainage from wound, pain and edema near MCP joint, some tenderness with flexion and extension of MCP joint.  SKIN: no suspicious lesions or rashes  NEURO: Normal strength and tone, mentation intact and speech normal  PSYCH: mentation appears normal, affect normal/bright  LYMPH: no cervical, supraclavicular, axillary, or inguinal adenopathy    Diagnostic Test Results:  Results for orders placed or performed in visit on 10/26/17 (from the past 24 hour(s))   CBC with platelets and differential   Result Value Ref Range    WBC 6.1 4.0 - 11.0 10e9/L    RBC " Count 4.68 3.8 - 5.2 10e12/L    Hemoglobin 14.1 11.7 - 15.7 g/dL    Hematocrit 42.5 35.0 - 47.0 %    MCV 91 78 - 100 fl    MCH 30.1 26.5 - 33.0 pg    MCHC 33.2 31.5 - 36.5 g/dL    RDW 12.2 10.0 - 15.0 %    Platelet Count 215 150 - 450 10e9/L    Diff Method Automated Method     % Neutrophils 62.0 %    % Lymphocytes 27.3 %    % Monocytes 7.5 %    % Eosinophils 2.9 %    % Basophils 0.3 %    Absolute Neutrophil 3.8 1.6 - 8.3 10e9/L    Absolute Lymphocytes 1.7 0.8 - 5.3 10e9/L    Absolute Monocytes 0.5 0.0 - 1.3 10e9/L    Absolute Eosinophils 0.2 0.0 - 0.7 10e9/L    Absolute Basophils 0.0 0.0 - 0.2 10e9/L   ESR: Erythrocyte sedimentation rate   Result Value Ref Range    Sed Rate 20 0 - 20 mm/h       ASSESSMENT/PLAN:     (W55.01XD) Cat bite, subsequent encounter  (primary encounter diagnosis)  Comment: Cat bite occurred on 10/21/17. Patient's symptoms are still not improving even after Augmentin, IV Zosyn, Clindamycin, and Levofloxacin. Will take pt off Levofloxacin secondary to pt experiencing suicidal ideations while being on it. Discussed treatment options, will draw blood work today for further evaluation, will have pt see hand surgeon Dr. Reveles with [facility] for further evaluation and treatment of her symptoms. ?may need debridement  Plan: CBC with platelets and differential, ESR:         Erythrocyte sedimentation rate        Follow up based on labs.    (L03.012) Cellulitis of finger of left hand  Comment: Will have pt see hand surgeon at [facility] for further evaluation and treatment.  Plan: Follow up if needed.    The information in this document, created by the medical scribe for me, accurately reflects the services I personally performed and the decisions made by me. I have reviewed and approved this document for accuracy prior to leaving the patient care area.  October 26, 2017 9:54 AM    Karen Weiler, MD  [facility]

## 2017-10-25 NOTE — TELEPHONE ENCOUNTER
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   She went to the ED on Sunday because she had red streaks and fever.  She is now on Levaquin and Clindamycin. ED and UC Notes in chart.     She is calling to report that she now has a temp of 100.7.  She has not had a fever since Tuesday night.  She has been on both of these antibiotics since Sunday. Finger is looking better, less swollen-almost normal looking, not red,  on joint.     She has been doing warm soaks with each antibiotic dose- 4x/day.  She is also feeling a little emotional and she is thinking this may be from Levaquin. She has been feeling this way x 3 days which coincides with starting the Levaquin  I did a PHQ9 and it was very elevated: 22 with suicidal ideation.       Last PHQ-9 score on record=   PHQ-9 SCORE 10/25/2017   Total Score -   Total Score MyChart -   Total Score 22     Routing to Dr Weiler/Tyrel Weaver RN- Triage [employer]                 She is willing to continue on the Levaquin

## 2017-10-26 ENCOUNTER — OFFICE VISIT (OUTPATIENT)
Dept: FAMILY MEDICINE | Facility: CLINIC | Age: 47
End: 2017-10-26
Payer: COMMERCIAL

## 2017-10-26 ENCOUNTER — TRANSFERRED RECORDS (OUTPATIENT)
Dept: HEALTH INFORMATION MANAGEMENT | Facility: CLINIC | Age: 47
End: 2017-10-26

## 2017-10-26 VITALS
HEART RATE: 101 BPM | BODY MASS INDEX: 32.14 KG/M2 | HEIGHT: 66 IN | OXYGEN SATURATION: 97 % | SYSTOLIC BLOOD PRESSURE: 104 MMHG | DIASTOLIC BLOOD PRESSURE: 70 MMHG | WEIGHT: 200 LBS | TEMPERATURE: 98.5 F
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
## 2017-10-26 DIAGNOSIS — L03.012 CELLULITIS OF FINGER OF LEFT HAND: ICD-10-CM

## 2017-10-26 LAB
BASOPHILS # BLD AUTO: 0 10E9/L (ref 0–0.2)
BASOPHILS NFR BLD AUTO: 0.3 %
DIFFERENTIAL METHOD BLD: NORMAL
EOSINOPHIL # BLD AUTO: 0.2 10E9/L (ref 0–0.7)
EOSINOPHIL NFR BLD AUTO: 2.9 %
ERYTHROCYTE [DISTWIDTH] IN BLOOD BY AUTOMATED COUNT: 12.2 % (ref 10–15)
ERYTHROCYTE [SEDIMENTATION RATE] IN BLOOD BY WESTERGREN METHOD: 20 MM/H (ref 0–20)
HCT VFR BLD AUTO: 42.5 % (ref 35–47)
HGB BLD-MCNC: 14.1 G/DL (ref 11.7–15.7)
LYMPHOCYTES # BLD AUTO: 1.7 10E9/L (ref 0.8–5.3)
LYMPHOCYTES NFR BLD AUTO: 27.3 %
MCH RBC QN AUTO: 30.1 PG (ref 26.5–33)
MCHC RBC AUTO-ENTMCNC: 33.2 G/DL (ref 31.5–36.5)
MCV RBC AUTO: 91 FL (ref 78–100)
MONOCYTES # BLD AUTO: 0.5 10E9/L (ref 0–1.3)
MONOCYTES NFR BLD AUTO: 7.5 %
NEUTROPHILS # BLD AUTO: 3.8 10E9/L (ref 1.6–8.3)
NEUTROPHILS NFR BLD AUTO: 62 %
PLATELET # BLD AUTO: 215 10E9/L (ref 150–450)
RBC # BLD AUTO: 4.68 10E12/L (ref 3.8–5.2)
WBC # BLD AUTO: 6.1 10E9/L (ref 4–11)

## 2017-10-26 PROCEDURE — 85025 COMPLETE CBC W/AUTO DIFF WBC: CPT | Performed by: FAMILY MEDICINE

## 2017-10-26 PROCEDURE — 99213 OFFICE O/P EST LOW 20 MIN: CPT | Performed by: FAMILY MEDICINE

## 2017-10-26 PROCEDURE — 85652 RBC SED RATE AUTOMATED: CPT | Performed by: FAMILY MEDICINE

## 2017-10-26 PROCEDURE — 36415 COLL VENOUS BLD VENIPUNCTURE: CPT | Performed by: FAMILY MEDICINE

## 2017-10-26 NOTE — PROGRESS NOTES
Dear Milagro,    Your blood counts and inflammatory marker (ESR) were within normal limits.    Please contact the clinic if you have additional questions.  Thank you.    Sincerely,    Bridgett Barry PA-C  Physician extender for Dr. Karen Weiler

## 2017-10-26 NOTE — MR AVS SNAPSHOT
"              After Visit Summary   10/26/2017    Milagro Frye    MRN: [id number]           Patient Information     Date Of Birth          1970        Visit Information        Provider Department      10/26/2017 8:40 AM Weiler, Karen, MD [facility] [place]        Care Instructions    [facility] in [place].          Follow-ups after your visit        Who to contact     If you have questions or need follow up information about today's clinic visit or your schedule please contact [facility] [place] directly at [phone].  Normal or non-critical lab and imaging results will be communicated to you by [employer], letter or phone within 4 business days after the clinic has received the results. If you do not hear from us within 7 days, please contact the clinic through [employer] or phone. If you have a critical or abnormal lab result, we will notify you by phone as soon as possible.  Submit refill requests through [employer] or call your pharmacy and they will forward the refill request to us. Please allow 3 business days for your refill to be completed.          Additional Information About Your Visit        MyChart Information     [employer] gives you secure access to your electronic health record. If you see a primary care provider, you can also send messages to your care team and make appointments. If you have questions, please call your primary care clinic.  If you do not have a primary care provider, please call [phone] and they will assist you.        Care EveryWhere ID     This is your Care EveryWhere ID. This could be used by other organizations to access your [place] medical records  [id number]        Your Vitals Were     Pulse Temperature Height Pulse Oximetry BMI (Body Mass Index)       101 98.5  F (36.9  C) (Oral) 5' 6\" (1.676 m) 97% 32.28 kg/m2        Blood Pressure from Last 3 Encounters:   10/26/17 104/70   10/22/17 124/79   10/21/17 120/80    Weight " from Last 3 Encounters:   10/26/17 200 lb (90.7 kg)   10/21/17 203 lb (92.1 kg)   09/06/17 203 lb 12.8 oz (92.4 kg)              Today, you had the following     No orders found for display       Primary Care Provider Office Phone # Fax #    Karen Weiler, [phone] [address]  [facility] [address]        Equal Access to Services     [facility]: Hadii aad ku hadasho Soomaali, waaxda luqadaha, qaybta kaalmada adeegyada, waxay idiin hayaan adeeg kharash la'shaunn . So [facility] [phone].    ATENCIÓN: Si esaula espjasmyne, tiene a higgins disposición servicios gratuitos de asistencia lingüística. [facility] [phone].    We comply with applicable federal civil rights laws and [place] laws. We do not discriminate on the basis of race, color, national origin, age, disability, sex, sexual orientation, or gender identity.            Thank you!     Thank you for choosing [facility]  for your care. Our goal is always to provide you with excellent care. Hearing back from our patients is one way we can continue to improve our services. Please take a few minutes to complete the written survey that you may receive in the mail after your visit with us. Thank you!             Your Updated Medication List - Protect others around you: Learn how to safely use, store and throw away your medicines at www.disposemymeds.org.          This list is accurate as of: 10/26/17  9:42 AM.  Always use your most recent med list.                   Brand Name Dispense Instructions for use Diagnosis    amoxicillin-clavulanate 875-125 MG per tablet    AUGMENTIN    20 tablet    Take 1 tablet by mouth 2 times daily    Cat bite of index finger, initial encounter       cetirizine 10 MG tablet    zyrTEC    30 tablet    Take 1 tablet (10 mg) by mouth every evening        clindamycin 300 MG capsule    CLEOCIN    28 capsule    Take 1 capsule (300 mg) by mouth 4 times daily for 7 days        cyclobenzaprine 10 MG tablet     FLEXERIL    30 tablet    Take 0.5-1 tablets (5-10 mg) by mouth 3 times daily as needed for muscle spasms    Right-sided low back pain without sciatica, unspecified chronicity       * DULoxetine 60 MG EC capsule    CYMBALTA    90 capsule    Take 1 capsule (60 mg) by mouth daily    Major depressive disorder, recurrent episode, moderate (H)       * DULoxetine 30 MG EC capsule    CYMBALTA    90 capsule    Take 1 capsule (30 mg) by mouth daily    Major depressive disorder, recurrent episode, moderate (H)       HYDROcodone-acetaminophen 5-325 MG per tablet    NORCO    10 tablet    Take 1-2 tablets by mouth every 4 hours as needed for moderate to severe pain        meclizine 25 MG tablet    ANTIVERT    30 tablet    Take 1 tablet (25 mg) by mouth every 6 hours as needed for dizziness    BPPV (benign paroxysmal positional vertigo), left       nitroGLYcerin 0.1 MG/HR 24 hr patch    NITRODUR          ondansetron 8 MG ODT tab    ZOFRAN ODT    20 tablet    Take 1 tablet (8 mg) by mouth every 8 hours as needed for nausea    BPPV (benign paroxysmal positional vertigo), left       TYLENOL PO           * Notice:  This list has 2 medication(s) that are the same as other medications prescribed for you. Read the directions carefully, and ask your doctor or other care provider to review them with you.

## 2017-10-26 NOTE — NURSING NOTE
"Chief Complaint   Patient presents with     ER F/U       Initial /70  Pulse 101  Temp 98.5  F (36.9  C) (Oral)  Ht 5' 6\" (1.676 m)  Wt 200 lb (90.7 kg)  SpO2 97%  BMI 32.28 kg/m2 Estimated body mass index is 32.28 kg/(m^2) as calculated from the following:    Height as of this encounter: 5' 6\" (1.676 m).    Weight as of this encounter: 200 lb (90.7 kg).  Medication Reconciliation: complete   Sindy Knapp Certified Medical Assistant    "

## 2017-10-28 LAB
BACTERIA SPEC CULT: NO GROWTH
Lab: NORMAL
SPECIMEN SOURCE: NORMAL

## 2017-12-29 ENCOUNTER — TELEPHONE (OUTPATIENT)
Dept: FAMILY MEDICINE | Facility: CLINIC | Age: 47
End: 2017-12-29

## 2017-12-29 NOTE — TELEPHONE ENCOUNTER
Date Forms was received: December 29, 2017    Forms received by: Fax    Last office visit: 10/26/2017    Purpose of Form:  Health Care Provider Report    When the form is due:  ASAP    How the form needs to be returned for patient:  Fax    Form currently placed  ARIELLE desk space.  Please let TC know if pt needs to be seen or if we can complete this based off the notes from her visits.  She was seen 7/18/2017, 7/21/2017, 7/31/2017, 8/10/2017, 8/24/2017 and 9/6/2017.  Seema Tubbs

## 2018-04-30 DIAGNOSIS — F33.1 MAJOR DEPRESSIVE DISORDER, RECURRENT EPISODE, MODERATE (H): ICD-10-CM

## 2018-04-30 RX ORDER — DULOXETIN HYDROCHLORIDE 30 MG/1
30 CAPSULE, DELAYED RELEASE ORAL DAILY
Qty: 30 CAPSULE | Refills: 0 | Status: SHIPPED | OUTPATIENT
Start: 2018-04-30 | End: 2018-07-13

## 2018-04-30 NOTE — TELEPHONE ENCOUNTER
Routing refill request to provider for review/approval because:  Out of range:  PHQ-9  Patient needs to be seen because:  Due for depression/anxiety follow up and needs to establish care  Alexa Bassett RN, BSN  [facility]

## 2018-04-30 NOTE — TELEPHONE ENCOUNTER
30 day refill given.  Pharmacy to inform patient that she needs to be seen to establish new primary care.    Chart reviewed.  Rx sent to pt's preferred pharmacy.       [id number] - [place], [place] - [address] [facility] [address] [id number] - [place], [place] - [address] [facility] [address]    Fran Santos MD

## 2018-06-02 ENCOUNTER — TRANSFERRED RECORDS (OUTPATIENT)
Dept: HEALTH INFORMATION MANAGEMENT | Facility: CLINIC | Age: 48
End: 2018-06-02

## 2018-06-06 ENCOUNTER — TRANSFERRED RECORDS (OUTPATIENT)
Dept: HEALTH INFORMATION MANAGEMENT | Facility: CLINIC | Age: 48
End: 2018-06-06

## 2018-06-19 ENCOUNTER — TRANSFERRED RECORDS (OUTPATIENT)
Dept: HEALTH INFORMATION MANAGEMENT | Facility: CLINIC | Age: 48
End: 2018-06-19

## 2018-07-01 ENCOUNTER — TRANSFERRED RECORDS (OUTPATIENT)
Dept: HEALTH INFORMATION MANAGEMENT | Facility: CLINIC | Age: 48
End: 2018-07-01

## 2018-07-13 ENCOUNTER — OFFICE VISIT (OUTPATIENT)
Dept: FAMILY MEDICINE | Facility: CLINIC | Age: 48
End: 2018-07-13
Payer: COMMERCIAL

## 2018-07-13 VITALS — BODY MASS INDEX: 32.12 KG/M2 | WEIGHT: 199 LBS

## 2018-07-13 DIAGNOSIS — M54.50 RIGHT-SIDED LOW BACK PAIN WITHOUT SCIATICA, UNSPECIFIED CHRONICITY: ICD-10-CM

## 2018-07-13 DIAGNOSIS — Z12.31 VISIT FOR SCREENING MAMMOGRAM: Primary | ICD-10-CM

## 2018-07-13 DIAGNOSIS — Z13.6 CARDIOVASCULAR SCREENING; LDL GOAL LESS THAN 160: ICD-10-CM

## 2018-07-13 DIAGNOSIS — F33.1 MAJOR DEPRESSIVE DISORDER, RECURRENT EPISODE, MODERATE (H): ICD-10-CM

## 2018-07-13 DIAGNOSIS — Z13.1 SCREENING FOR DIABETES MELLITUS: ICD-10-CM

## 2018-07-13 PROCEDURE — 99213 OFFICE O/P EST LOW 20 MIN: CPT | Performed by: FAMILY MEDICINE

## 2018-07-13 RX ORDER — CYCLOBENZAPRINE HCL 10 MG
5-10 TABLET ORAL 3 TIMES DAILY PRN
Qty: 30 TABLET | Refills: 1 | Status: SHIPPED | OUTPATIENT
Start: 2018-07-13 | End: 2019-04-15

## 2018-07-13 RX ORDER — DULOXETIN HYDROCHLORIDE 60 MG/1
60 CAPSULE, DELAYED RELEASE ORAL DAILY
Qty: 90 CAPSULE | Refills: 1 | Status: SHIPPED | OUTPATIENT
Start: 2018-07-13 | End: 2019-03-13

## 2018-07-13 RX ORDER — DULOXETIN HYDROCHLORIDE 30 MG/1
30 CAPSULE, DELAYED RELEASE ORAL DAILY
Qty: 90 CAPSULE | Refills: 1 | Status: SHIPPED | OUTPATIENT
Start: 2018-07-13 | End: 2019-03-13

## 2018-07-13 ASSESSMENT — ANXIETY QUESTIONNAIRES
1. FEELING NERVOUS, ANXIOUS, OR ON EDGE: SEVERAL DAYS
6. BECOMING EASILY ANNOYED OR IRRITABLE: SEVERAL DAYS
2. NOT BEING ABLE TO STOP OR CONTROL WORRYING: NOT AT ALL
7. FEELING AFRAID AS IF SOMETHING AWFUL MIGHT HAPPEN: NOT AT ALL
IF YOU CHECKED OFF ANY PROBLEMS ON THIS QUESTIONNAIRE, HOW DIFFICULT HAVE THESE PROBLEMS MADE IT FOR YOU TO DO YOUR WORK, TAKE CARE OF THINGS AT HOME, OR GET ALONG WITH OTHER PEOPLE: NOT DIFFICULT AT ALL
GAD7 TOTAL SCORE: 3
3. WORRYING TOO MUCH ABOUT DIFFERENT THINGS: SEVERAL DAYS
5. BEING SO RESTLESS THAT IT IS HARD TO SIT STILL: NOT AT ALL

## 2018-07-13 ASSESSMENT — PATIENT HEALTH QUESTIONNAIRE - PHQ9: 5. POOR APPETITE OR OVEREATING: NOT AT ALL

## 2018-07-13 NOTE — MR AVS SNAPSHOT
After Visit Summary   7/13/2018    Milagro Frye    MRN: [id number]           Patient Information     Date Of Birth          1970        Visit Information        Provider Department      7/13/2018 11:00 AM Ernie Cordova DO [facility] [place]        Today's Diagnoses     Visit for screening mammogram    -  1    Major depressive disorder, recurrent episode, moderate (H)        Screening for diabetes mellitus        CARDIOVASCULAR SCREENING; LDL GOAL LESS THAN 160          Care Instructions    1. Schedule lab only appointment for fasting labs.          Follow-ups after your visit        Follow-up notes from your care team     Return in about 6 months (around 1/13/2019) for depression/anxiety follow up.      Future tests that were ordered for you today     Open Future Orders        Priority Expected Expires Ordered    MA SCREENING DIGITAL BILAT - Future  (s+30) Routine  7/13/2019 7/13/2018    Lipid panel reflex to direct LDL Fasting Routine  7/13/2019 7/13/2018    Comprehensive metabolic panel Routine  7/13/2019 7/13/2018            Who to contact     If you have questions or need follow up information about today's clinic visit or your schedule please contact [facility] [place] directly at [phone].  Normal or non-critical lab and imaging results will be communicated to you by MyChart, letter or phone within 4 business days after the clinic has received the results. If you do not hear from us within 7 days, please contact the clinic through SiTunehart or phone. If you have a critical or abnormal lab result, we will notify you by phone as soon as possible.  Submit refill requests through IMT or call your pharmacy and they will forward the refill request to us. Please allow 3 business days for your refill to be completed.          Additional Information About Your Visit        MyChart Information     IMT gives you secure access to your electronic health record. If you see a  primary care provider, you can also send messages to your care team and make appointments. If you have questions, please call your primary care clinic.  If you do not have a primary care provider, please call [phone] and they will assist you.        Care EveryWhere ID     This is your Care EveryWhere ID. This could be used by other organizations to access your [facility] medical records  [id number]        Your Vitals Were     BMI (Body Mass Index)                   32.12 kg/m2            Blood Pressure from Last 3 Encounters:   10/26/17 104/70   10/22/17 124/79   10/21/17 120/80    Weight from Last 3 Encounters:   07/13/18 199 lb (90.3 kg)   10/26/17 200 lb (90.7 kg)   10/21/17 203 lb (92.1 kg)              We Performed the Following     DEPRESSION ACTION PLAN (DAP)          Today's Medication Changes          These changes are accurate as of 7/13/18 11:47 AM.  If you have any questions, ask your nurse or doctor.               Stop taking these medicines if you haven't already. Please contact your care team if you have questions.     amoxicillin-clavulanate 875-125 MG per tablet   Commonly known as:  AUGMENTIN                Where to get your medicines      These medications were sent to [facility] [address] [place] [address] AT [place] [address] & [address] [address]    Hours:  24-hours Phone:  [phone]     DULoxetine 30 MG EC capsule    DULoxetine 60 MG EC capsule                Primary Care Provider Fax #    Provider Not In System [phone]                Equal Access to Services     HARVEY VENEGAS AH: Hadii micah pandeyo Sojarett, waaxda luqadaha, qaybta kaalmada rj hudson. So [facility] [phone].    ATENCIÓN: Si habla español, tiene a higgins disposición servicios gratuitos de asistencia lingüística. Llame al [phone].    We comply with applicable federal civil rights laws and [place] laws. We do not  discriminate on the basis of race, color, national origin, age, disability, sex, sexual orientation, or gender identity.            Thank you!     Thank you for choosing [facility]  for your care. Our goal is always to provide you with excellent care. Hearing back from our patients is one way we can continue to improve our services. Please take a few minutes to complete the written survey that you may receive in the mail after your visit with us. Thank you!             Your Updated Medication List - Protect others around you: Learn how to safely use, store and throw away your medicines at www.disposemymeds.org.          This list is accurate as of 7/13/18 11:47 AM.  Always use your most recent med list.                   Brand Name Dispense Instructions for use Diagnosis    cetirizine 10 MG tablet    zyrTEC    30 tablet    Take 1 tablet (10 mg) by mouth every evening        cyclobenzaprine 10 MG tablet    FLEXERIL    30 tablet    Take 0.5-1 tablets (5-10 mg) by mouth 3 times daily as needed for muscle spasms    Right-sided low back pain without sciatica, unspecified chronicity       * DULoxetine 30 MG EC capsule    CYMBALTA    90 capsule    Take 1 capsule (30 mg) by mouth daily    Major depressive disorder, recurrent episode, moderate (H)       * DULoxetine 60 MG EC capsule    CYMBALTA    90 capsule    Take 1 capsule (60 mg) by mouth daily    Major depressive disorder, recurrent episode, moderate (H)       HYDROcodone-acetaminophen 5-325 MG per tablet    NORCO    10 tablet    Take 1-2 tablets by mouth every 4 hours as needed for moderate to severe pain        meclizine 25 MG tablet    ANTIVERT    30 tablet    Take 1 tablet (25 mg) by mouth every 6 hours as needed for dizziness    BPPV (benign paroxysmal positional vertigo), left       nitroGLYcerin 0.1 MG/HR 24 hr patch    NITRODUR          ondansetron 8 MG ODT tab    ZOFRAN ODT    20 tablet    Take 1 tablet (8 mg) by mouth every 8 hours as needed for  nausea    BPPV (benign paroxysmal positional vertigo), left       TYLENOL PO           * Notice:  This list has 2 medication(s) that are the same as other medications prescribed for you. Read the directions carefully, and ask your doctor or other care provider to review them with you.
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
## 2018-07-13 NOTE — LETTER
My Depression Action Plan  Name: Milagro Frye   Date of Birth 1970  Date: 7/13/2018    My doctor: System, Provider Not In   My clinic: [facility] [place]  [address]  [place] [address] [phone]          GREEN    ZONE   Good Control    What it looks like:     Things are going generally well. You have normal up s and down s. You may even feel depressed from time to time, but bad moods usually last less than a day.   What you need to do:  1. Continue to care for yourself (see self care plan)  2. Check your depression survival kit and update it as needed  3. Follow your physician s recommendations including any medication.  4. Do not stop taking medication unless you consult with your physician first.           YELLOW         ZONE Getting Worse    What it looks like:     Depression is starting to interfere with your life.     It may be hard to get out of bed; you may be starting to isolate yourself from others.    Symptoms of depression are starting to last most all day and this has happened for several days.     You may have suicidal thoughts but they are not constant.   What you need to do:     1. Call your care team, your response to treatment will improve if you keep your care team informed of your progress. Yellow periods are signs an adjustment may need to be made.     2. Continue your self-care, even if you have to fake it!    3. Talk to someone in your support network    4. Open up your depression survival kit           RED    ZONE Medical Alert - Get Help    What it looks like:     Depression is seriously interfering with your life.     You may experience these or other symptoms: You can t get out of bed most days, can t work or engage in other necessary activities, you have trouble taking care of basic hygiene, or basic responsibilities, thoughts of suicide or death that will not go away, self-injurious behavior.     What you need to do:  1. Call your care team and  request a same-day appointment. If they are not available (weekends or after hours) call your local crisis line, emergency room or 911.            Depression Self Care Plan / Survival Kit    Self-Care for Depression  Here s the deal. Your body and mind are really not as separate as most people think.  What you do and think affects how you feel and how you feel influences what you do and think. This means if you do things that people who feel good do, it will help you feel better.  Sometimes this is all it takes.  There is also a place for medication and therapy depending on how severe your depression is, so be sure to consult with your medical provider and/ or Behavioral Health Consultant if your symptoms are worsening or not improving.     In order to better manage my stress, I will:    Exercise  Get some form of exercise, every day. This will help reduce pain and release endorphins, the  feel good  chemicals in your brain. This is almost as good as taking antidepressants!  This is not the same as joining a gym and then never going! (they count on that by the way ) It can be as simple as just going for a walk or doing some gardening, anything that will get you moving.      Hygiene   Maintain good hygiene (Get out of bed in the morning, Make your bed, Brush your teeth, Take a shower, and Get dressed like you were going to work, even if you are unemployed).  If your clothes don't fit try to get ones that do.    Diet  I will strive to eat foods that are good for me, drink plenty of water, and avoid excessive sugar, caffeine, alcohol, and other mood-altering substances.  Some foods that are helpful in depression are: complex carbohydrates, B vitamins, flaxseed, fish or fish oil, fresh fruits and vegetables.    Psychotherapy  I agree to participate in Individual Therapy (if recommended).    Medication  If prescribed medications, I agree to take them.  Missing doses can result in serious side effects.  I understand that  drinking alcohol, or other illicit drug use, may cause potential side effects.  I will not stop my medication abruptly without first discussing it with my provider.    Staying Connected With Others  I will stay in touch with my friends, family members, and my primary care provider/team.    Use your imagination  Be creative.  We all have a creative side; it doesn t matter if it s oil painting, sand castles, or mud pies! This will also kick up the endorphins.    Witness Beauty  (AKA stop and smell the roses) Take a look outside, even in mid-winter. Notice colors, textures. Watch the squirrels and birds.     Service to others  Be of service to others.  There is always someone else in need.  By helping others we can  get out of ourselves  and remember the really important things.  This also provides opportunities for practicing all the other parts of the program.    Humor  Laugh and be silly!  Adjust your TV habits for less news and crime-drama and more comedy.    Control your stress  Try breathing deep, massage therapy, biofeedback, and meditation. Find time to relax each day.     My support system    Clinic Contact:  Phone number:    Contact 1:  Phone number:    Contact 2:  Phone number:    Worship/:  Phone number:    Therapist:  Phone number:    Local crisis center:    Phone number:    Other community support:  Phone number:

## 2018-07-14 ASSESSMENT — PATIENT HEALTH QUESTIONNAIRE - PHQ9: SUM OF ALL RESPONSES TO PHQ QUESTIONS 1-9: 5

## 2018-07-14 ASSESSMENT — ANXIETY QUESTIONNAIRES: GAD7 TOTAL SCORE: 3

## 2018-07-29 ENCOUNTER — HEALTH MAINTENANCE LETTER (OUTPATIENT)
Age: 48
End: 2018-07-29

## 2018-07-31 DIAGNOSIS — Z13.6 CARDIOVASCULAR SCREENING; LDL GOAL LESS THAN 160: ICD-10-CM

## 2018-07-31 DIAGNOSIS — Z13.1 SCREENING FOR DIABETES MELLITUS: ICD-10-CM

## 2018-07-31 LAB
ALBUMIN SERPL-MCNC: 3.2 G/DL (ref 3.4–5)
ALP SERPL-CCNC: 94 U/L (ref 40–150)
ALT SERPL W P-5'-P-CCNC: 24 U/L (ref 0–50)
ANION GAP SERPL CALCULATED.3IONS-SCNC: 8 MMOL/L (ref 3–14)
AST SERPL W P-5'-P-CCNC: 19 U/L (ref 0–45)
BILIRUB SERPL-MCNC: 0.3 MG/DL (ref 0.2–1.3)
BUN SERPL-MCNC: 10 MG/DL (ref 7–30)
CALCIUM SERPL-MCNC: 8.5 MG/DL (ref 8.5–10.1)
CHLORIDE SERPL-SCNC: 106 MMOL/L (ref 94–109)
CHOLEST SERPL-MCNC: 239 MG/DL
CO2 SERPL-SCNC: 26 MMOL/L (ref 20–32)
CREAT SERPL-MCNC: 0.7 MG/DL (ref 0.52–1.04)
GFR SERPL CREATININE-BSD FRML MDRD: 90 ML/MIN/1.7M2
GLUCOSE SERPL-MCNC: 91 MG/DL (ref 70–99)
HDLC SERPL-MCNC: 55 MG/DL
LDLC SERPL CALC-MCNC: 153 MG/DL
NONHDLC SERPL-MCNC: 184 MG/DL
POTASSIUM SERPL-SCNC: 4 MMOL/L (ref 3.4–5.3)
PROT SERPL-MCNC: 6.7 G/DL (ref 6.8–8.8)
SODIUM SERPL-SCNC: 140 MMOL/L (ref 133–144)
TRIGL SERPL-MCNC: 155 MG/DL

## 2018-07-31 PROCEDURE — 80061 LIPID PANEL: CPT | Performed by: FAMILY MEDICINE

## 2018-07-31 PROCEDURE — 80053 COMPREHEN METABOLIC PANEL: CPT | Performed by: FAMILY MEDICINE

## 2018-07-31 PROCEDURE — 36415 COLL VENOUS BLD VENIPUNCTURE: CPT | Performed by: FAMILY MEDICINE

## 2018-09-05 ENCOUNTER — TELEPHONE (OUTPATIENT)
Dept: FAMILY MEDICINE | Facility: CLINIC | Age: 48
End: 2018-09-05

## 2018-09-05 DIAGNOSIS — Z11.1 SCREENING EXAMINATION FOR PULMONARY TUBERCULOSIS: Primary | ICD-10-CM

## 2018-09-05 NOTE — TELEPHONE ENCOUNTER
Reason for Call: Request for an order or referral:    Order or referral being requested: Mantoux Blood Lab test    Date needed: as soon as possible    Has the patient been seen by the PCP for this problem? YES    Additional comments: Pt called this morning and wanted to come into the lab today for a mantoux blood test, however, there were no orders in the system for her. Please place these before the pt comes into the clinic tomorrow 09/06/2018 @ 4pm. Thank you.    Phone number Patient can be reached at:  Home number on file [phone] (home)    Best Time:      Can we leave a detailed message on this number?  YES    Call taken on 9/5/2018 at 11:56 AM by Darlyn Snyder

## 2018-09-05 NOTE — TELEPHONE ENCOUNTER
"Patient is requesting the mantoux blood testing stating she has skin reactions and does not want to test as a \"false positive\" because of a skin reaction.  Patient states she is aware this test may be more expensive and willing to pay for the test to get more accurate results.      Will route to provider for approval.    Yadira Rubi RN  [employer] [facility]      "

## 2018-09-06 NOTE — TELEPHONE ENCOUNTER
Future order placed for quantiferon gold TB test. Please let Milagro know she should schedule lab only visit to get this completed.

## 2018-09-06 NOTE — TELEPHONE ENCOUNTER
Called pt and left detailed message letting her know order was placed.  Can keep appt as scheduled.  Seema Tubbs

## 2018-09-10 DIAGNOSIS — Z11.1 SCREENING EXAMINATION FOR PULMONARY TUBERCULOSIS: ICD-10-CM

## 2018-09-10 PROCEDURE — 86480 TB TEST CELL IMMUN MEASURE: CPT | Performed by: FAMILY MEDICINE

## 2018-09-11 LAB
GAMMA INTERFERON BACKGROUND BLD IA-ACNC: 0 IU/ML
M TB IFN-G BLD-IMP: NEGATIVE
M TB IFN-G CD4+ BCKGRND COR BLD-ACNC: 0 IU/ML
MITOGEN IGNF BCKGRD COR BLD-ACNC: 0 IU/ML
MITOGEN IGNF BCKGRD COR BLD-ACNC: 0 IU/ML

## 2018-10-07 ENCOUNTER — TRANSFERRED RECORDS (OUTPATIENT)
Dept: HEALTH INFORMATION MANAGEMENT | Facility: CLINIC | Age: 48
End: 2018-10-07

## 2018-10-17 ENCOUNTER — TRANSFERRED RECORDS (OUTPATIENT)
Dept: HEALTH INFORMATION MANAGEMENT | Facility: CLINIC | Age: 48
End: 2018-10-17

## 2018-10-25 ENCOUNTER — TRANSFERRED RECORDS (OUTPATIENT)
Dept: HEALTH INFORMATION MANAGEMENT | Facility: CLINIC | Age: 48
End: 2018-10-25

## 2018-11-15 ENCOUNTER — TRANSFERRED RECORDS (OUTPATIENT)
Dept: HEALTH INFORMATION MANAGEMENT | Facility: CLINIC | Age: 48
End: 2018-11-15

## 2018-12-18 ENCOUNTER — TRANSFERRED RECORDS (OUTPATIENT)
Dept: HEALTH INFORMATION MANAGEMENT | Facility: CLINIC | Age: 48
End: 2018-12-18

## 2019-01-14 ENCOUNTER — HOSPITAL ENCOUNTER (EMERGENCY)
Facility: CLINIC | Age: 49
Discharge: HOME | End: 2019-01-14
Attending: EMERGENCY MEDICINE | Admitting: EMERGENCY MEDICINE
Payer: OTHER MISCELLANEOUS

## 2019-01-14 VITALS
DIASTOLIC BLOOD PRESSURE: 73 MMHG | OXYGEN SATURATION: 97 % | SYSTOLIC BLOOD PRESSURE: 128 MMHG | HEART RATE: 90 BPM | TEMPERATURE: 98.7 F

## 2019-01-14 DIAGNOSIS — Z77.21 EXPOSURE TO BLOOD OR BODY FLUID: ICD-10-CM

## 2019-01-14 PROCEDURE — 99281 EMR DPT VST MAYX REQ PHY/QHP: CPT | Performed by: EMERGENCY MEDICINE

## 2019-01-14 PROCEDURE — 99282 EMERGENCY DEPT VISIT SF MDM: CPT | Mod: Z6 | Performed by: EMERGENCY MEDICINE

## 2019-01-14 NOTE — ED PROVIDER NOTES
History     Chief Complaint   Patient presents with     Body Fluid Exposure     HPI  Milagro Frye is a 48 year old female who presents emergency department from the seventh floor for further evaluation of an exposure.  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  Patient reports that she immediately cleaned the area and flushed her eyes with 1 L normal saline and then presented to the emergency department.  No other complaints, no other exposures.    I have reviewed the Medications, Allergies, Past Medical and Surgical History, and Social History in the Epic system.    Review of Systems   Skin:        Body fluid exposure   All other systems reviewed and are negative.      Physical Exam   BP: 128/73  Pulse: 90  Temp: 98.7  F (37.1  C)  Weight: (pt declined)  SpO2: 97 %      Physical Exam   Constitutional: She is oriented to person, place, and time. She appears well-developed. No distress.   HENT:   Head: Normocephalic and atraumatic.   Mouth/Throat: Oropharynx is clear and moist.   Eyes: Conjunctivae and EOM are normal. Pupils are equal, round, and reactive to light.   Neck: Normal range of motion. Neck supple.   Cardiovascular: Normal rate, regular rhythm and normal heart sounds.   Pulmonary/Chest: Effort normal and breath sounds normal. No respiratory distress. She has no wheezes. She has no rales.   Abdominal: Soft. She exhibits no distension. There is no tenderness. There is no rebound and no guarding.   Musculoskeletal: Normal range of motion. She exhibits no tenderness or deformity.   Neurological: She is alert and oriented to person, place, and time. No cranial nerve deficit. Coordination normal.   Skin: Skin is warm and dry. No rash noted.   Psychiatric: She has a normal mood and affect. Her behavior is normal.       ED Course        Procedures               Labs Ordered and Resulted from Time of ED  Arrival Up to the Time of Departure from the ED - No data to display         Assessments & Plan (with Medical Decision Making)   Milagro Frye is a 48 year old female who presents emergency department from the seventh floor for further evaluation of an exposure.  Upon arrival patient is well-appearing, afebrile, no distress.  Patient hemodynamically stable.  Patient with a body fluid exposure to her bilateral eyes and has already irrigated prior to arrival.  At this time blood was drawn and patient encouraged to follow-up with employee health.  Return precautions discussed.  Patient understands and agrees with the plan.    I have reviewed the nursing notes.    I have reviewed the findings, diagnosis, plan and need for follow up with the patient.       Medication List      There are no discharge medications for this visit.         Final diagnoses:   Exposure to blood or body fluid       1/14/2019   [facility], [place], EMERGENCY DEPARTMENT     Samantha Marquez MD  01/14/19 0614

## 2019-01-14 NOTE — ED TRIAGE NOTES
Pt was exposed on 7C to a pts abdominal drainage from a ARIELLE drain. SHE IS HERE FOR EXPOSURE testing.

## 2019-01-14 NOTE — ED NOTES
Pt stated that her eyes were already irrigated on the floor and she did not want them irrigated again.

## 2019-01-14 NOTE — DISCHARGE INSTRUCTIONS
Please follow up with employee health for further evaluation and your results. Please return to the ER if you develop and worsening symptoms.

## 2019-01-14 NOTE — ED AVS SNAPSHOT
[facility], [place], Emergency Department  [address] [place] [id number]  Phone:  [phone]                                    Milagro Frye   MRN: [id number]    Department:  [facility], Emergency Department   Date of Visit:  1/14/2019           After Visit Summary Signature Page    I have received my discharge instructions, and my questions have been answered. I have discussed any challenges I see with this plan with the nurse or doctor.    ..........................................................................................................................................  Patient/Patient Representative Signature      ..........................................................................................................................................  Patient Representative Print Name and Relationship to Patient    ..................................................               ................................................  Date                                   Time    ..........................................................................................................................................  Reviewed by Signature/Title    ...................................................              ..............................................  Date                                               Time          22EPIC Rev 08/18

## 2019-02-19 ENCOUNTER — OFFICE VISIT (OUTPATIENT)
Dept: FAMILY MEDICINE | Facility: CLINIC | Age: 49
End: 2019-02-19
Payer: COMMERCIAL

## 2019-02-19 ENCOUNTER — MYC MEDICAL ADVICE (OUTPATIENT)
Dept: FAMILY MEDICINE | Facility: CLINIC | Age: 49
End: 2019-02-19

## 2019-02-19 VITALS
HEART RATE: 99 BPM | SYSTOLIC BLOOD PRESSURE: 136 MMHG | BODY MASS INDEX: 32.62 KG/M2 | HEIGHT: 66 IN | WEIGHT: 203 LBS | DIASTOLIC BLOOD PRESSURE: 72 MMHG | OXYGEN SATURATION: 95 % | TEMPERATURE: 98.7 F

## 2019-02-19 DIAGNOSIS — R52 GENERALIZED BODY ACHES: ICD-10-CM

## 2019-02-19 DIAGNOSIS — J10.1 INFLUENZA A: Primary | ICD-10-CM

## 2019-02-19 DIAGNOSIS — R05.9 COUGH: ICD-10-CM

## 2019-02-19 DIAGNOSIS — Z12.31 ENCOUNTER FOR SCREENING MAMMOGRAM FOR BREAST CANCER: ICD-10-CM

## 2019-02-19 LAB
FLUAV+FLUBV AG SPEC QL: NEGATIVE
FLUAV+FLUBV AG SPEC QL: POSITIVE
SPECIMEN SOURCE: ABNORMAL

## 2019-02-19 PROCEDURE — 99213 OFFICE O/P EST LOW 20 MIN: CPT | Performed by: INTERNAL MEDICINE

## 2019-02-19 PROCEDURE — 87804 INFLUENZA ASSAY W/OPTIC: CPT | Performed by: INTERNAL MEDICINE

## 2019-02-19 RX ORDER — AZITHROMYCIN 250 MG/1
TABLET, FILM COATED ORAL
Qty: 6 TABLET | Refills: 0 | Status: SHIPPED | OUTPATIENT
Start: 2019-02-19 | End: 2019-04-15

## 2019-02-19 ASSESSMENT — ANXIETY QUESTIONNAIRES
6. BECOMING EASILY ANNOYED OR IRRITABLE: SEVERAL DAYS
2. NOT BEING ABLE TO STOP OR CONTROL WORRYING: SEVERAL DAYS
5. BEING SO RESTLESS THAT IT IS HARD TO SIT STILL: SEVERAL DAYS
GAD7 TOTAL SCORE: 7
3. WORRYING TOO MUCH ABOUT DIFFERENT THINGS: SEVERAL DAYS
1. FEELING NERVOUS, ANXIOUS, OR ON EDGE: SEVERAL DAYS
7. FEELING AFRAID AS IF SOMETHING AWFUL MIGHT HAPPEN: SEVERAL DAYS
IF YOU CHECKED OFF ANY PROBLEMS ON THIS QUESTIONNAIRE, HOW DIFFICULT HAVE THESE PROBLEMS MADE IT FOR YOU TO DO YOUR WORK, TAKE CARE OF THINGS AT HOME, OR GET ALONG WITH OTHER PEOPLE: SOMEWHAT DIFFICULT

## 2019-02-19 ASSESSMENT — PATIENT HEALTH QUESTIONNAIRE - PHQ9
SUM OF ALL RESPONSES TO PHQ QUESTIONS 1-9: 4
5. POOR APPETITE OR OVEREATING: SEVERAL DAYS

## 2019-02-19 ASSESSMENT — MIFFLIN-ST. JEOR: SCORE: 1567.55
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
## 2019-02-19 NOTE — LETTER
[facility]          [address] [place] [address]                            [phone]  Fax: [phone]    Milagro Frye  [address] [facility] [address]    [id number]    February 19, 2019      To whom it may concern    Please excuse Milagro Frye from work on February 19th through the 22nd due to illness.  If you have any other questions or concerns please feel free to contact me at anytime.        Sincerely,        Kristal Braden MD

## 2019-02-20 ASSESSMENT — ANXIETY QUESTIONNAIRES: GAD7 TOTAL SCORE: 7

## 2019-02-20 NOTE — RESULT ENCOUNTER NOTE
Results discussed directly with patient while patient was present. Any further details documented in the note.    Erna Braden MD

## 2019-02-22 ENCOUNTER — TELEPHONE (OUTPATIENT)
Dept: FAMILY MEDICINE | Facility: CLINIC | Age: 49
End: 2019-02-22

## 2019-02-22 DIAGNOSIS — J22 LOWER RESPIRATORY TRACT INFECTION: Primary | ICD-10-CM

## 2019-02-22 RX ORDER — DOXYCYCLINE HYCLATE 100 MG
100 TABLET ORAL 2 TIMES DAILY
Qty: 14 TABLET | Refills: 0 | Status: SHIPPED | OUTPATIENT
Start: 2019-02-22 | End: 2019-03-01

## 2019-02-22 NOTE — TELEPHONE ENCOUNTER
Called patient and gave information below:     Let's try Doxycycline 100 mg twice daily for 7 days. Call on Monday if not improving.    Patient stated an understanding and agreed with plan.    Vashti SUAREZN, RN   [facility]

## 2019-02-22 NOTE — TELEPHONE ENCOUNTER
"Patient would like to switch antibiotics at this time.     Avoid Augmentin because patient states it \"tears her to bits\"   And cannot take Levaquin has psychological reaction     Pharmacy: [facility] in [place]. Pended.     Routing to PCP for further review/recommendations/orders.      Call back number: [phone]      Vashti SUAREZN, RN   [facility]       "

## 2019-02-22 NOTE — TELEPHONE ENCOUNTER
Office visit on 2/19/19 has influenza A and has fluids in lungs and was given Azithromycin. 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 Feeling a heavy, burning sensation as well as hears more crackles in her lungs.     Wants to know if Dr. Braden would like to switch medications or have her come in again.     Routing to PCP for further review/recommendations/orders.    Call back number: [phone]    Vashti SUAREZN, RN   [facility]

## 2019-02-22 NOTE — TELEPHONE ENCOUNTER
I know it's getting late but is she able to come in and see a provider today? A chest x-raw would be worthwhile. Otherwise could switch antibiotics for the weekend.

## 2019-03-04 ENCOUNTER — HOSPITAL ENCOUNTER (OUTPATIENT)
Dept: MAMMOGRAPHY | Facility: CLINIC | Age: 49
Discharge: HOME | End: 2019-03-04
Attending: INTERNAL MEDICINE | Admitting: INTERNAL MEDICINE
Payer: COMMERCIAL

## 2019-03-04 DIAGNOSIS — Z12.31 ENCOUNTER FOR SCREENING MAMMOGRAM FOR BREAST CANCER: ICD-10-CM

## 2019-03-04 PROCEDURE — 77063 BREAST TOMOSYNTHESIS BI: CPT

## 2019-03-13 DIAGNOSIS — F33.1 MAJOR DEPRESSIVE DISORDER, RECURRENT EPISODE, MODERATE (H): ICD-10-CM

## 2019-03-13 NOTE — TELEPHONE ENCOUNTER
Patient called at 4:34 and has to leave town in the am on 3/14 and is hoping that the scripts can be okayed by then.  Explained Dr Gustafson left early today but would ask if we cuold vary the 72 luma policy for her, with no promises but to try.

## 2019-03-13 NOTE — LETTER
[facility]  [address]  [facility] [address] [phone]  March 19, 2019    Milagro Frye  [address] [facility] [address]    Dear Milagro,    We were able to refill the medication you need for a one time bee refill, but we will need to see you in the office before we can provide any further refills.  Please call [phone] to schedule an appointment for a medication follow up at your earliest convenience.  This can be done via an [employer] in your [email or web address] account or a phone visit as well.  If you have any questions please let me know.    Thank you for choosing [facility]

## 2019-03-14 NOTE — TELEPHONE ENCOUNTER
Routing refill request to provider for review/approval because:  PHQ-9 and SELENA-7 were completed on 2/19/19 when patient was seen by Dr. Braden for influenza, however, she has not had an office visit to address depression since July 2018. Please advise if OK to refill. Thank you.  Alexa Bassett, DANIELAN, RN  [facility]

## 2019-03-14 NOTE — TELEPHONE ENCOUNTER
"Requested Prescriptions   Pending Prescriptions Disp Refills     DULoxetine (CYMBALTA) 30 MG capsule [Pharmacy Med Name: DULOXETINE DR 30MG CAPSULES]  Last Written Prescription Date:  7/13/2018  Last Fill Quantity: 90 capsule,  # refills: 1   Last office visit: 7/13/2018 with prescribing provider:  Art     Future Office Visit:       90 capsule 0     Sig: TAKE 1 CAPSULE(30 MG) BY MOUTH DAILY    Serotonin-Norepinephrine Reuptake Inhibitors  Passed - 3/13/2019  4:36 PM       Passed - Blood pressure under 140/90 in past 12 months    BP Readings from Last 3 Encounters:   02/19/19 136/72   01/14/19 128/73   10/26/17 104/70            Passed - PHQ-9 score of less than 5 in past 6 months    Please review last PHQ-9 score.     PHQ-9 SCORE 10/25/2017 7/13/2018 2/19/2019   PHQ-9 Total Score - - -   PHQ-9 Total Score MyChart - - -   PHQ-9 Total Score 22 5 4     SELENA-7 SCORE 8/24/2017 7/13/2018 2/19/2019   Total Score - - -   Total Score - - -   Total Score 11 3 7          Passed - Medication is active on med list       Passed - Patient is age 18 or older       Passed - No active pregnancy on record       Passed - No positive pregnancy test in past 12 months       Passed - Recent (6 mo) or future (30 days) visit within the authorizing provider's specialty    Patient had office visit in the last 6 months or has a visit in the next 30 days with authorizing provider or within the authorizing provider's specialty.  See \"Patient Info\" tab in inbasket, or \"Choose Columns\" in Meds & Orders section of the refill encounter.                      DULoxetine (CYMBALTA) 60 MG capsule [Pharmacy Med Name: DULOXETINE DR 60MG CAPSULES]  Last Written Prescription Date:  7/13/2018  Last Fill Quantity: 90 capsule,  # refills: 1   Last office visit: 7/13/2018 with prescribing provider:  Art     Future Office Visit:        0     Sig: TAKE 1 CAPSULE(60 MG) BY MOUTH DAILY    Serotonin-Norepinephrine Reuptake Inhibitors  Passed - 3/13/2019  4:36 " "PM       Passed - Blood pressure under 140/90 in past 12 months    BP Readings from Last 3 Encounters:   02/19/19 136/72   01/14/19 128/73   10/26/17 104/70            Passed - PHQ-9 score of less than 5 in past 6 months    Please review last PHQ-9 score.     PHQ-9 SCORE 10/25/2017 7/13/2018 2/19/2019   PHQ-9 Total Score - - -   PHQ-9 Total Score MyChart - - -   PHQ-9 Total Score 22 5 4     SELENA-7 SCORE 8/24/2017 7/13/2018 2/19/2019   Total Score - - -   Total Score - - -   Total Score 11 3 7          Passed - Medication is active on med list       Passed - Patient is age 18 or older       Passed - No active pregnancy on record       Passed - No positive pregnancy test in past 12 months       Passed - Recent (6 mo) or future (30 days) visit within the authorizing provider's specialty    Patient had office visit in the last 6 months or has a visit in the next 30 days with authorizing provider or within the authorizing provider's specialty.  See \"Patient Info\" tab in inbasket, or \"Choose Columns\" in Meds & Orders section of the refill encounter.            "

## 2019-03-15 RX ORDER — DULOXETIN HYDROCHLORIDE 60 MG/1
CAPSULE, DELAYED RELEASE ORAL
Qty: 30 CAPSULE | Refills: 0 | Status: SHIPPED | OUTPATIENT
Start: 2019-03-15 | End: 2019-04-15

## 2019-03-15 RX ORDER — DULOXETIN HYDROCHLORIDE 30 MG/1
CAPSULE, DELAYED RELEASE ORAL
Qty: 30 CAPSULE | Refills: 0 | Status: SHIPPED | OUTPATIENT
Start: 2019-03-15 | End: 2019-04-15

## 2019-03-15 NOTE — TELEPHONE ENCOUNTER
30 day refill signed. She is due for follow up on depression. This could be a phone or eVisit as well as in office.    Ernie Cordova,   3/15/2019 2:10 PM

## 2019-04-01 ENCOUNTER — MYC MEDICAL ADVICE (OUTPATIENT)
Dept: FAMILY MEDICINE | Facility: CLINIC | Age: 49
End: 2019-04-01

## 2019-04-02 NOTE — TELEPHONE ENCOUNTER
Please see [employer] message and advise.     Patient had mammogram on 3/4/19.     Routing to PCP for further review/recommendations/orders.    Vashti SUAREZN, RN   [facility]

## 2019-04-05 ENCOUNTER — APPOINTMENT (OUTPATIENT)
Dept: MRI IMAGING | Facility: CLINIC | Age: 49
End: 2019-04-05
Attending: EMERGENCY MEDICINE
Payer: OTHER MISCELLANEOUS

## 2019-04-05 ENCOUNTER — HOSPITAL ENCOUNTER (EMERGENCY)
Facility: CLINIC | Age: 49
Discharge: HOME | End: 2019-04-06
Attending: EMERGENCY MEDICINE | Admitting: EMERGENCY MEDICINE
Payer: OTHER MISCELLANEOUS

## 2019-04-05 DIAGNOSIS — H81.399 PERIPHERAL VERTIGO, UNSPECIFIED LATERALITY: ICD-10-CM

## 2019-04-05 LAB
ANION GAP SERPL CALCULATED.3IONS-SCNC: 5 MMOL/L (ref 3–14)
BASOPHILS # BLD AUTO: 0 10E9/L (ref 0–0.2)
BASOPHILS NFR BLD AUTO: 0.3 %
BUN SERPL-MCNC: 15 MG/DL (ref 7–30)
CALCIUM SERPL-MCNC: 9 MG/DL (ref 8.5–10.1)
CHLORIDE SERPL-SCNC: 104 MMOL/L (ref 94–109)
CO2 SERPL-SCNC: 28 MMOL/L (ref 20–32)
CREAT SERPL-MCNC: 0.74 MG/DL (ref 0.52–1.04)
DIFFERENTIAL METHOD BLD: NORMAL
EOSINOPHIL # BLD AUTO: 0.1 10E9/L (ref 0–0.7)
EOSINOPHIL NFR BLD AUTO: 1.8 %
ERYTHROCYTE [DISTWIDTH] IN BLOOD BY AUTOMATED COUNT: 11.7 % (ref 10–15)
GFR SERPL CREATININE-BSD FRML MDRD: >90 ML/MIN/{1.73_M2}
GLUCOSE SERPL-MCNC: 91 MG/DL (ref 70–99)
HCT VFR BLD AUTO: 43.2 % (ref 35–47)
HGB BLD-MCNC: 13.9 G/DL (ref 11.7–15.7)
IMM GRANULOCYTES # BLD: 0 10E9/L (ref 0–0.4)
IMM GRANULOCYTES NFR BLD: 0.3 %
LYMPHOCYTES # BLD AUTO: 1.5 10E9/L (ref 0.8–5.3)
LYMPHOCYTES NFR BLD AUTO: 22.5 %
MCH RBC QN AUTO: 30.1 PG (ref 26.5–33)
MCHC RBC AUTO-ENTMCNC: 32.2 G/DL (ref 31.5–36.5)
MCV RBC AUTO: 94 FL (ref 78–100)
MONOCYTES # BLD AUTO: 0.5 10E9/L (ref 0–1.3)
MONOCYTES NFR BLD AUTO: 7.1 %
NEUTROPHILS # BLD AUTO: 4.6 10E9/L (ref 1.6–8.3)
NEUTROPHILS NFR BLD AUTO: 68 %
NRBC # BLD AUTO: 0 10*3/UL
NRBC BLD AUTO-RTO: 0 /100
PLATELET # BLD AUTO: 211 10E9/L (ref 150–450)
POTASSIUM SERPL-SCNC: 3.7 MMOL/L (ref 3.4–5.3)
RBC # BLD AUTO: 4.62 10E12/L (ref 3.8–5.2)
SODIUM SERPL-SCNC: 138 MMOL/L (ref 133–144)
TROPONIN I SERPL-MCNC: <0.015 UG/L (ref 0–0.04)
WBC # BLD AUTO: 6.8 10E9/L (ref 4–11)

## 2019-04-05 PROCEDURE — 84484 ASSAY OF TROPONIN QUANT: CPT | Performed by: EMERGENCY MEDICINE

## 2019-04-05 PROCEDURE — 99284 EMERGENCY DEPT VISIT MOD MDM: CPT | Mod: Z6 | Performed by: EMERGENCY MEDICINE

## 2019-04-05 PROCEDURE — 25000132 ZZH RX MED GY IP 250 OP 250 PS 637: Performed by: EMERGENCY MEDICINE

## 2019-04-05 PROCEDURE — 99285 EMERGENCY DEPT VISIT HI MDM: CPT | Mod: 25 | Performed by: EMERGENCY MEDICINE

## 2019-04-05 PROCEDURE — 70551 MRI BRAIN STEM W/O DYE: CPT

## 2019-04-05 PROCEDURE — 25000128 H RX IP 250 OP 636: Performed by: EMERGENCY MEDICINE

## 2019-04-05 PROCEDURE — 85025 COMPLETE CBC W/AUTO DIFF WBC: CPT | Performed by: EMERGENCY MEDICINE

## 2019-04-05 PROCEDURE — 80048 BASIC METABOLIC PNL TOTAL CA: CPT | Performed by: EMERGENCY MEDICINE

## 2019-04-05 PROCEDURE — 96374 THER/PROPH/DIAG INJ IV PUSH: CPT | Mod: 59 | Performed by: EMERGENCY MEDICINE

## 2019-04-05 RX ORDER — MECLIZINE HYDROCHLORIDE 25 MG/1
25 TABLET ORAL ONCE
Status: COMPLETED | OUTPATIENT
Start: 2019-04-05 | End: 2019-04-05

## 2019-04-05 RX ORDER — LORAZEPAM 2 MG/ML
0.5 INJECTION INTRAMUSCULAR
Status: COMPLETED | OUTPATIENT
Start: 2019-04-05 | End: 2019-04-05

## 2019-04-05 RX ADMIN — LORAZEPAM 0.5 MG: 2 INJECTION INTRAMUSCULAR; INTRAVENOUS at 22:41

## 2019-04-05 RX ADMIN — MECLIZINE HYDROCHLORIDE 25 MG: 25 TABLET ORAL at 21:10

## 2019-04-05 ASSESSMENT — ENCOUNTER SYMPTOMS
VOMITING: 0
DIARRHEA: 0
NAUSEA: 1
FEVER: 0
DIZZINESS: 1

## 2019-04-05 ASSESSMENT — MIFFLIN-ST. JEOR: SCORE: 1531.26

## 2019-04-05 NOTE — ED AVS SNAPSHOT
[facility] [place], Emergency Department  [address] [place] [address]  Phone:  [phone]                                    Milagro Frye   MRN: [id number]    Department:  [facility], Emergency Department   Date of Visit:  4/5/2019           After Visit Summary Signature Page    I have received my discharge instructions, and my questions have been answered. I have discussed any challenges I see with this plan with the nurse or doctor.    ..........................................................................................................................................  Patient/Patient Representative Signature      ..........................................................................................................................................  Patient Representative Print Name and Relationship to Patient    ..................................................               ................................................  Date                                   Time    ..........................................................................................................................................  Reviewed by Signature/Title    ...................................................              ..............................................  Date                                               Time          22EPIC Rev 08/18

## 2019-04-05 NOTE — LETTER
April 6, 2019      To Whom It May Concern:      Milagro Frye was seen in our Emergency Department today, 04/06/19.  I expect her condition to improve over the next 1-5 days.  She may return to work/school when improved.    Sincerely,        Sonido Arboleda MD

## 2019-04-06 VITALS
HEIGHT: 66 IN | BODY MASS INDEX: 31.34 KG/M2 | SYSTOLIC BLOOD PRESSURE: 114 MMHG | RESPIRATION RATE: 16 BRPM | OXYGEN SATURATION: 97 % | HEART RATE: 84 BPM | DIASTOLIC BLOOD PRESSURE: 66 MMHG | WEIGHT: 195 LBS | TEMPERATURE: 97 F

## 2019-04-06 RX ORDER — MECLIZINE HYDROCHLORIDE 25 MG/1
25 TABLET ORAL 4 TIMES DAILY PRN
Qty: 30 TABLET | Refills: 0 | Status: SHIPPED | OUTPATIENT
Start: 2019-04-06 | End: 2021-07-07

## 2019-04-06 ASSESSMENT — ENCOUNTER SYMPTOMS
WEAKNESS: 0
TREMORS: 0
SPEECH DIFFICULTY: 0
LIGHT-HEADEDNESS: 0
FACIAL ASYMMETRY: 0
SEIZURES: 0
NUMBNESS: 0

## 2019-04-06 NOTE — DISCHARGE INSTRUCTIONS
Please make an appointment to follow up with [facility] (phone: [phone]) as soon as possible.    Meclizine as directed.    Try the Epley maneuvers at home to see if this lessens your symptoms.    Return to the emergency department for any problems.
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
## 2019-04-06 NOTE — ED TRIAGE NOTES
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

## 2019-04-15 ENCOUNTER — OFFICE VISIT (OUTPATIENT)
Dept: FAMILY MEDICINE | Facility: CLINIC | Age: 49
End: 2019-04-15
Payer: COMMERCIAL

## 2019-04-15 VITALS
BODY MASS INDEX: 31.96 KG/M2 | TEMPERATURE: 97.6 F | OXYGEN SATURATION: 98 % | HEART RATE: 81 BPM | SYSTOLIC BLOOD PRESSURE: 110 MMHG | WEIGHT: 198 LBS | DIASTOLIC BLOOD PRESSURE: 63 MMHG

## 2019-04-15 DIAGNOSIS — F41.9 ANXIETY: ICD-10-CM

## 2019-04-15 DIAGNOSIS — F33.1 MAJOR DEPRESSIVE DISORDER, RECURRENT EPISODE, MODERATE (H): ICD-10-CM

## 2019-04-15 DIAGNOSIS — E78.5 HYPERLIPIDEMIA LDL GOAL <100: ICD-10-CM

## 2019-04-15 DIAGNOSIS — Z83.49 FAMILY HISTORY OF THYROID DISEASE: ICD-10-CM

## 2019-04-15 DIAGNOSIS — N62 LARGE BREASTS: ICD-10-CM

## 2019-04-15 DIAGNOSIS — N94.10 DYSPAREUNIA, FEMALE: ICD-10-CM

## 2019-04-15 DIAGNOSIS — M54.50 RIGHT-SIDED LOW BACK PAIN WITHOUT SCIATICA, UNSPECIFIED CHRONICITY: ICD-10-CM

## 2019-04-15 DIAGNOSIS — Z00.00 ANNUAL VISIT FOR GENERAL ADULT MEDICAL EXAMINATION WITHOUT ABNORMAL FINDINGS: Primary | ICD-10-CM

## 2019-04-15 DIAGNOSIS — Z82.49 FAMILY HISTORY OF PERIPHERAL VASCULAR DISEASE: ICD-10-CM

## 2019-04-15 PROCEDURE — 99213 OFFICE O/P EST LOW 20 MIN: CPT | Mod: 25 | Performed by: INTERNAL MEDICINE

## 2019-04-15 PROCEDURE — 36415 COLL VENOUS BLD VENIPUNCTURE: CPT | Performed by: INTERNAL MEDICINE

## 2019-04-15 PROCEDURE — 80053 COMPREHEN METABOLIC PANEL: CPT | Performed by: INTERNAL MEDICINE

## 2019-04-15 PROCEDURE — 99396 PREV VISIT EST AGE 40-64: CPT | Performed by: INTERNAL MEDICINE

## 2019-04-15 PROCEDURE — 80061 LIPID PANEL: CPT | Performed by: INTERNAL MEDICINE

## 2019-04-15 PROCEDURE — 84443 ASSAY THYROID STIM HORMONE: CPT | Performed by: INTERNAL MEDICINE

## 2019-04-15 PROCEDURE — 82306 VITAMIN D 25 HYDROXY: CPT | Performed by: INTERNAL MEDICINE

## 2019-04-15 RX ORDER — CYCLOBENZAPRINE HCL 10 MG
5-10 TABLET ORAL 3 TIMES DAILY PRN
Qty: 30 TABLET | Refills: 1 | Status: SHIPPED | OUTPATIENT
Start: 2019-04-15 | End: 2019-08-29

## 2019-04-15 RX ORDER — VENLAFAXINE HYDROCHLORIDE 75 MG/1
75 CAPSULE, EXTENDED RELEASE ORAL DAILY
Qty: 30 CAPSULE | Refills: 0 | Status: SHIPPED | OUTPATIENT
Start: 2019-04-15 | End: 2019-05-13

## 2019-04-15 RX ORDER — DULOXETIN HYDROCHLORIDE 30 MG/1
60 CAPSULE, DELAYED RELEASE ORAL DAILY
Qty: 60 CAPSULE | Refills: 1 | Status: SHIPPED | OUTPATIENT
Start: 2019-04-15 | End: 2019-08-29

## 2019-04-15 ASSESSMENT — PATIENT HEALTH QUESTIONNAIRE - PHQ9
5. POOR APPETITE OR OVEREATING: SEVERAL DAYS
SUM OF ALL RESPONSES TO PHQ QUESTIONS 1-9: 4

## 2019-04-15 ASSESSMENT — ANXIETY QUESTIONNAIRES
2. NOT BEING ABLE TO STOP OR CONTROL WORRYING: SEVERAL DAYS
1. FEELING NERVOUS, ANXIOUS, OR ON EDGE: SEVERAL DAYS
IF YOU CHECKED OFF ANY PROBLEMS ON THIS QUESTIONNAIRE, HOW DIFFICULT HAVE THESE PROBLEMS MADE IT FOR YOU TO DO YOUR WORK, TAKE CARE OF THINGS AT HOME, OR GET ALONG WITH OTHER PEOPLE: SOMEWHAT DIFFICULT
7. FEELING AFRAID AS IF SOMETHING AWFUL MIGHT HAPPEN: SEVERAL DAYS
GAD7 TOTAL SCORE: 7
5. BEING SO RESTLESS THAT IT IS HARD TO SIT STILL: SEVERAL DAYS
3. WORRYING TOO MUCH ABOUT DIFFERENT THINGS: SEVERAL DAYS
6. BECOMING EASILY ANNOYED OR IRRITABLE: SEVERAL DAYS

## 2019-04-15 NOTE — PROGRESS NOTES
SUBJECTIVE:   CC: Milagro Frye is an 48 year old woman who presents for preventive health visit.     Healthy Habits:    Do you get at least three servings of calcium containing foods daily (dairy, green leafy vegetables, etc.)? yes    Amount of exercise or daily activities, outside of work: 3 to 4 times a week     Problems taking medications regularly No    Medication side effects: No    Have you had an eye exam in the past two years? yes    Do you see a dentist twice per year? yes    Do you have sleep apnea, excessive snoring or daytime drowsiness?yes oring     Milagro has been struggling with more anxiety lately. She takes Duloxetine, wondering if this is the best course for her? She had previously been taking Zoloft and Celexa which worked well at first but then just stopped. She recently was out of her medication for a week and developed terrible withdrawal symptoms.    Pain with intercourse. Urinary incontinence. Right lower abdomen/pelvic pain.    Trying to exercise more. A lot of family members with peripheral vascular disease.     Today's PHQ-2 Score:   PHQ-2 ( 1999 [employer]) 2/19/2019 10/26/2017   Q1: Little interest or pleasure in doing things 0 3   Q2: Feeling down, depressed or hopeless 0 3   PHQ-2 Score 0 6   Q1: Little interest or pleasure in doing things - -   Q2: Feeling down, depressed or hopeless - -   PHQ-2 Score - -       Abuse: Current or Past(Physical, Sexual or Emotional)- No  Do you feel safe in your environment? Yes    Social History     Tobacco Use     Smoking status: Never Smoker     Smokeless tobacco: Never Used   Substance Use Topics     Alcohol use: Yes     Alcohol/week: 0.0 oz     Comment: very infrequent - once every few months     If you drink alcohol do you typically have >3 drinks per day or >7 drinks per week? No                     Reviewed orders with patient.  Reviewed health maintenance and updated orders accordingly - Yes  BP Readings from Last 3 Encounters:   04/15/19  110/63   04/06/19 114/66   02/19/19 136/72    Wt Readings from Last 3 Encounters:   04/15/19 89.8 kg (198 lb)   04/05/19 88.5 kg (195 lb)   02/19/19 92.1 kg (203 lb)                  Recent Labs   Lab Test 04/05/19 2054 07/31/18  0903  09/29/16  1434  10/26/15  1742 05/22/15  1021  08/21/13  0945   LDL  --  153*  --   --   --   --  157*  --  169*   HDL  --  55  --   --   --   --  54  --  46*   TRIG  --  155*  --   --   --   --  197*  --  151*   ALT  --  24  --   --   --  36  --   --  33   CR 0.74 0.70   < >  --    < > 0.66  --    < > 0.72   GFRESTIMATED >90 90   < >  --    < > >90  Non  GFR Calc    --    < > 88   GFRESTBLACK >90 >90   < >  --    < > >90   GFR Calc    --    < > >90   POTASSIUM 3.7 4.0   < >  --    < > 3.9  --    < > 4.3   TSH  --   --   --  1.54  --  1.35  --   --  2.39    < > = values in this interval not displayed.        Mammogram Screening: Patient under age 50, mutual decision reflected in health maintenance.      Pertinent mammograms are reviewed under the imaging tab.  History of abnormal Pap smear: NO - age 30-65 PAP every 5 years with negative HPV co-testing recommended  PAP / HPV Latest Ref Rng & Units 5/22/2015 9/14/2011 5/21/2010   PAP - NIL NIL NIL   HPV 16 DNA NEG Negative - -   HPV 18 DNA NEG Negative - -   OTHER HR HPV NEG Negative - -     Reviewed and updated as needed this visit by clinical staff  Tobacco  Allergies  Meds         Reviewed and updated as needed this visit by Provider        ROS:  CONSTITUTIONAL: NEGATIVE for fever, chills, change in weight  INTEGUMENTARY/SKIN: NEGATIVE for worrisome rashes, moles or lesions  EYES: NEGATIVE for vision changes or irritation  ENT: NEGATIVE for ear, mouth and throat problems  RESP: NEGATIVE for significant cough or SOB  BREAST: NEGATIVE for masses, tenderness or discharge  CV: NEGATIVE for chest pain, palpitations or peripheral edema  GI: NEGATIVE for nausea, abdominal pain, heartburn, or change in  bowel habits  : NEGATIVE for unusual urinary or vaginal symptoms. No vaginal bleeding.  MUSCULOSKELETAL: NEGATIVE for significant arthralgias or myalgia  NEURO: NEGATIVE for weakness, dizziness or paresthesias  PSYCHIATRIC: NEGATIVE for changes in mood or affect     OBJECTIVE:   /63   Pulse 81   Temp 97.6  F (36.4  C) (Tympanic)   Wt 89.8 kg (198 lb)   SpO2 98%   BMI 31.96 kg/m    EXAM:  GENERAL: healthy, alert and no distress  EYES: Eyes grossly normal to inspection, PERRL and conjunctivae and sclerae normal  HENT: ear canals and TM's normal, nose and mouth without ulcers or lesions  NECK: no adenopathy, no asymmetry, masses, thyroid enlarged  RESP: lungs clear to auscultation - no rales, rhonchi or wheezes  BREAST: normal without masses, tenderness or nipple discharge and no palpable axillary masses or adenopathy  CV: regular rate and rhythm, normal S1 S2, no S3 or S4, no murmur, click or rub, no peripheral edema and peripheral pulses strong  ABDOMEN: soft, nontender, no hepatosplenomegaly, no masses and bowel sounds normal  MS: no gross musculoskeletal defects noted, no edema  SKIN: no suspicious lesions or rashes  NEURO: Normal strength and tone, mentation intact and speech normal  PSYCH: mentation appears normal, affect normal/bright    Diagnostic Test Results:  none     ASSESSMENT/PLAN:   1. Annual visit for general adult medical examination without abnormal findings  - Lipid panel reflex to direct LDL Fasting  - Comprehensive metabolic panel    2. Major depressive disorder, recurrent episode, moderate (H)  Recommending we try switching Milagro from Cymbalta to Effexor. Will begin cross-tapering. Follow up in 4 weeks. At that time will increase Effexor to 150 mg and decrease Cymbalta to 30 mg.  - venlafaxine (EFFEXOR-XR) 75 MG 24 hr capsule; Take 1 capsule (75 mg) by mouth daily  Dispense: 30 capsule; Refill: 0  - DULoxetine (CYMBALTA) 30 MG capsule; Take 2 capsules (60 mg) by mouth daily   "Dispense: 60 capsule; Refill: 1  - Vitamin D Deficiency    3. Anxiety  See #2  - venlafaxine (EFFEXOR-XR) 75 MG 24 hr capsule; Take 1 capsule (75 mg) by mouth daily  Dispense: 30 capsule; Refill: 0    4. Right-sided low back pain without sciatica, unspecified chronicity  - cyclobenzaprine (FLEXERIL) 10 MG tablet; Take 0.5-1 tablets (5-10 mg) by mouth 3 times daily as needed for muscle spasms  Dispense: 30 tablet; Refill: 1    5. Family history of thyroid disease  Thyroid enlarged on exam and positive family history. Will screen with TSh and consider thyroid US.  - TSH with free T4 reflex    6. Family history of peripheral vascular disease  Milagro has hyperlipidemia and she is overweight, both risk factors. Discussed things that she can change such as diet and increasing exercise.  - CT Coronary Calcium Scan; Future    7. Hyperlipidemia LDL goal <100  - CT Coronary Calcium Scan; Future    8. Dyspareunia, female  - [facility] PT, HAND, AND CHIROPRACTIC REFERRAL; Future    9. Large breasts  - PLASTIC SURGERY REFERRAL    COUNSELING:   Reviewed preventive health counseling, as reflected in patient instructions       Regular exercise       Healthy diet/nutrition       Vision screening       Hearing screening       Aspirin Prophylaxsis       Osteoporosis Prevention/Bone Health       Colon cancer screening    BP Readings from Last 1 Encounters:   04/15/19 110/63     Estimated body mass index is 31.96 kg/m  as calculated from the following:    Height as of 4/5/19: 1.676 m (5' 6\").    Weight as of this encounter: 89.8 kg (198 lb).      Weight management plan: Discussed healthy diet and exercise guidelines     reports that she has never smoked. She has never used smokeless tobacco.      Counseling Resources:  ATP IV Guidelines  Pooled Cohorts Equation Calculator  Breast Cancer Risk Calculator  FRAX Risk Assessment  ICSI Preventive Guidelines  Dietary Guidelines for Americans, 2010  USDA's MyPlate  ASA Prophylaxis  Lung CA " Screening    Erna Braden MD  [facility] NIURKA MURILLO

## 2019-04-16 LAB
ALBUMIN SERPL-MCNC: 3.7 G/DL (ref 3.4–5)
ALP SERPL-CCNC: 99 U/L (ref 40–150)
ALT SERPL W P-5'-P-CCNC: 19 U/L (ref 0–50)
ANION GAP SERPL CALCULATED.3IONS-SCNC: 6 MMOL/L (ref 3–14)
AST SERPL W P-5'-P-CCNC: 20 U/L (ref 0–45)
BILIRUB SERPL-MCNC: 0.2 MG/DL (ref 0.2–1.3)
BUN SERPL-MCNC: 18 MG/DL (ref 7–30)
CALCIUM SERPL-MCNC: 9 MG/DL (ref 8.5–10.1)
CHLORIDE SERPL-SCNC: 107 MMOL/L (ref 94–109)
CHOLEST SERPL-MCNC: 261 MG/DL
CO2 SERPL-SCNC: 27 MMOL/L (ref 20–32)
CREAT SERPL-MCNC: 0.71 MG/DL (ref 0.52–1.04)
DEPRECATED CALCIDIOL+CALCIFEROL SERPL-MC: 24 UG/L (ref 20–75)
GFR SERPL CREATININE-BSD FRML MDRD: >90 ML/MIN/{1.73_M2}
GLUCOSE SERPL-MCNC: 88 MG/DL (ref 70–99)
HDLC SERPL-MCNC: 48 MG/DL
LDLC SERPL CALC-MCNC: 172 MG/DL
NONHDLC SERPL-MCNC: 213 MG/DL
POTASSIUM SERPL-SCNC: 3.8 MMOL/L (ref 3.4–5.3)
PROT SERPL-MCNC: 7.5 G/DL (ref 6.8–8.8)
SODIUM SERPL-SCNC: 140 MMOL/L (ref 133–144)
TRIGL SERPL-MCNC: 207 MG/DL
TSH SERPL DL<=0.005 MIU/L-ACNC: 3.34 MU/L (ref 0.4–4)

## 2019-04-16 ASSESSMENT — ANXIETY QUESTIONNAIRES: GAD7 TOTAL SCORE: 7

## 2019-05-06 ENCOUNTER — HOSPITAL ENCOUNTER (OUTPATIENT)
Dept: CARDIOLOGY | Facility: CLINIC | Age: 49
Discharge: HOME | End: 2019-05-06
Attending: INTERNAL MEDICINE | Admitting: INTERNAL MEDICINE
Payer: COMMERCIAL

## 2019-05-06 DIAGNOSIS — E78.5 HYPERLIPIDEMIA LDL GOAL <100: ICD-10-CM

## 2019-05-06 DIAGNOSIS — Z82.49 FAMILY HISTORY OF PERIPHERAL VASCULAR DISEASE: ICD-10-CM

## 2019-05-06 PROCEDURE — 75571 CT HRT W/O DYE W/CA TEST: CPT

## 2019-05-06 PROCEDURE — 75571 CT HRT W/O DYE W/CA TEST: CPT | Mod: 26 | Performed by: INTERNAL MEDICINE

## 2019-05-07 ENCOUNTER — TELEPHONE (OUTPATIENT)
Dept: FAMILY MEDICINE | Facility: CLINIC | Age: 49
End: 2019-05-07

## 2019-05-07 DIAGNOSIS — E78.5 HYPERLIPIDEMIA LDL GOAL <100: Primary | ICD-10-CM

## 2019-05-07 LAB — RADIOLOGIST FLAGS: ABNORMAL

## 2019-05-07 NOTE — TELEPHONE ENCOUNTER
Patient had CT done yesterday. Urgent finding of 4 mm pulmonary nodule in the right lower lobe.    Dr. Braden is not in clinic. Routing to Dr. Tolbert and Dr. Braden.  Niyah Cam RN

## 2019-05-07 NOTE — TELEPHONE ENCOUNTER
Left message on answering machine for patient to call back.    Genie Morejon,RN BSN  [facility]  [phone]

## 2019-05-07 NOTE — TELEPHONE ENCOUNTER
She has single 4mm nodule,   If she has no increased risk including smoking hx, she may not need further f/u,   If she has increased risk(smoking hx), then she need f/u imaging in 12 months  She might need further discussion with PCP about surveillance plan. Please let her know  thx

## 2019-05-07 NOTE — TELEPHONE ENCOUNTER
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 Braden to comment on and patient would like a call back tomorrow. With Dr. Braden's assessment     Genie Morejon,RN BSN  [facility]  [phone]

## 2019-05-07 NOTE — TELEPHONE ENCOUNTER
Patient calling us back for results?  Please call her again.  Christina Jackman, Clinic Receptionist

## 2019-05-08 RX ORDER — ATORVASTATIN CALCIUM 20 MG/1
20 TABLET, FILM COATED ORAL DAILY
Qty: 90 TABLET | Refills: 3 | Status: SHIPPED | OUTPATIENT
Start: 2019-05-08 | End: 2020-04-17

## 2019-05-08 NOTE — TELEPHONE ENCOUNTER
S/w pt and advised of rx for atorvastatin sent to pharmacy per Dr. Braden.    Pt states understanding.    Karen ZHANG RN  EP Triage

## 2019-05-08 NOTE — TELEPHONE ENCOUNTER
Non detailed message left for pt to return call to clinic and ask to speak with a triage nurse.    Karen ZHANG RN  EP Triage

## 2019-05-08 NOTE — TELEPHONE ENCOUNTER
CT Coronary Calcium scan does show calcium content in her left anterior descending artery which correlates with increased risk for plaque build up here. Based on this I would strongly recommend initiation of a statin to lower her cholesterol since her LDL is quite high.     Regarding the CT scan, I would recommend a 12 month follow up scan. It is very likely a benign nodule but based a follow up scan is recommended.     If she agrees to the statin I will send a script in for her.

## 2019-05-08 NOTE — TELEPHONE ENCOUNTER
Patient was notified with information noted by provider and agreed with plan.  Patient would like atorvastatin, both parents are on that medication.  Pharmacy is pended.  DANIELA CaseyN, RN  [employer] Triage

## 2019-05-12 ENCOUNTER — MYC MEDICAL ADVICE (OUTPATIENT)
Dept: FAMILY MEDICINE | Facility: CLINIC | Age: 49
End: 2019-05-12

## 2019-05-12 DIAGNOSIS — F41.9 ANXIETY: ICD-10-CM

## 2019-05-12 DIAGNOSIS — F33.1 MAJOR DEPRESSIVE DISORDER, RECURRENT EPISODE, MODERATE (H): ICD-10-CM

## 2019-05-13 RX ORDER — VENLAFAXINE HYDROCHLORIDE 150 MG/1
150 CAPSULE, EXTENDED RELEASE ORAL DAILY
Qty: 90 CAPSULE | Refills: 0 | Status: SHIPPED | OUTPATIENT
Start: 2019-05-13 | End: 2019-08-23

## 2019-05-13 NOTE — TELEPHONE ENCOUNTER
Please see [email or web address] message below and advise.   Kimberly Butler RN   [facility] - Triage

## 2019-05-16 ENCOUNTER — THERAPY VISIT (OUTPATIENT)
Dept: PHYSICAL THERAPY | Facility: CLINIC | Age: 49
End: 2019-05-16
Attending: INTERNAL MEDICINE
Payer: COMMERCIAL

## 2019-05-16 DIAGNOSIS — N39.46 MIXED INCONTINENCE: Primary | ICD-10-CM

## 2019-05-16 DIAGNOSIS — N94.10 DYSPAREUNIA, FEMALE: ICD-10-CM

## 2019-05-16 PROCEDURE — 97535 SELF CARE MNGMENT TRAINING: CPT | Mod: GP | Performed by: PHYSICAL THERAPIST

## 2019-05-16 PROCEDURE — 97162 PT EVAL MOD COMPLEX 30 MIN: CPT | Mod: GP | Performed by: PHYSICAL THERAPIST

## 2019-05-16 PROCEDURE — 97110 THERAPEUTIC EXERCISES: CPT | Mod: GP | Performed by: PHYSICAL THERAPIST
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
## 2019-05-30 ENCOUNTER — THERAPY VISIT (OUTPATIENT)
Dept: PHYSICAL THERAPY | Facility: CLINIC | Age: 49
End: 2019-05-30
Payer: COMMERCIAL

## 2019-05-30 DIAGNOSIS — N94.10 DYSPAREUNIA, FEMALE: Primary | ICD-10-CM

## 2019-05-30 DIAGNOSIS — N39.46 MIXED INCONTINENCE: ICD-10-CM

## 2019-05-30 PROCEDURE — 97140 MANUAL THERAPY 1/> REGIONS: CPT | Mod: GP | Performed by: PHYSICAL THERAPIST

## 2019-05-30 PROCEDURE — 97110 THERAPEUTIC EXERCISES: CPT | Mod: GP | Performed by: PHYSICAL THERAPIST

## 2019-06-11 ENCOUNTER — TRANSFERRED RECORDS (OUTPATIENT)
Dept: HEALTH INFORMATION MANAGEMENT | Facility: CLINIC | Age: 49
End: 2019-06-11

## 2019-06-20 ENCOUNTER — THERAPY VISIT (OUTPATIENT)
Dept: PHYSICAL THERAPY | Facility: CLINIC | Age: 49
End: 2019-06-20
Payer: COMMERCIAL

## 2019-06-20 DIAGNOSIS — N39.46 MIXED INCONTINENCE: Primary | ICD-10-CM

## 2019-06-20 PROCEDURE — 97535 SELF CARE MNGMENT TRAINING: CPT | Mod: GP | Performed by: PHYSICAL THERAPIST

## 2019-06-20 PROCEDURE — 97140 MANUAL THERAPY 1/> REGIONS: CPT | Mod: GP | Performed by: PHYSICAL THERAPIST

## 2019-06-20 PROCEDURE — 97110 THERAPEUTIC EXERCISES: CPT | Mod: GP | Performed by: PHYSICAL THERAPIST

## 2019-07-02 ENCOUNTER — TRANSFERRED RECORDS (OUTPATIENT)
Dept: HEALTH INFORMATION MANAGEMENT | Facility: CLINIC | Age: 49
End: 2019-07-02

## 2019-07-14 ENCOUNTER — MYC MEDICAL ADVICE (OUTPATIENT)
Dept: FAMILY MEDICINE | Facility: CLINIC | Age: 49
End: 2019-07-14

## 2019-07-14 ENCOUNTER — MYC REFILL (OUTPATIENT)
Dept: FAMILY MEDICINE | Facility: CLINIC | Age: 49
End: 2019-07-14

## 2019-07-14 DIAGNOSIS — H81.12 BPPV (BENIGN PAROXYSMAL POSITIONAL VERTIGO), LEFT: ICD-10-CM

## 2019-07-14 DIAGNOSIS — R06.83 SNORING: Primary | ICD-10-CM

## 2019-07-15 NOTE — TELEPHONE ENCOUNTER
Please see My Chart Message and advise.  Triage to contact the patient.  Referral order pended.  Niyah Cam RN

## 2019-07-15 NOTE — TELEPHONE ENCOUNTER
"Requested Prescriptions   Pending Prescriptions Disp Refills     ondansetron (ZOFRAN ODT) 8 MG ODT tab  Last Written Prescription Date:  3-  Last Fill Quantity: 20 tablet,  # refills: 1   Last office visit: 4/15/2019 with prescribing provider:     Future Office Visit:     20 tablet 1     Sig: Take 1 tablet (8 mg) by mouth every 8 hours as needed for nausea        Antivertigo/Antiemetic Agents Passed - 7/15/2019 11:56 AM        Passed - Recent (12 mo) or future (30 days) visit within the authorizing provider's specialty     Patient had office visit in the last 12 months or has a visit in the next 30 days with authorizing provider or within the authorizing provider's specialty.  See \"Patient Info\" tab in inbasket, or \"Choose Columns\" in Meds & Orders section of the refill encounter.              Passed - Medication is active on med list        Passed - Patient is 18 years of age or older          "

## 2019-07-15 NOTE — TELEPHONE ENCOUNTER
"Requested Prescriptions   Pending Prescriptions Disp Refills     ondansetron (ZOFRAN ODT) 8 MG ODT tab  Last Written Prescription Date:  3/31/2016  Last Fill Quantity: 20 tablet,  # refills: 1   Last office visit: 4/15/2019 with prescribing provider:  Asiya     Future Office Visit:       20 tablet 1     Sig: Take 1 tablet (8 mg) by mouth every 8 hours as needed for nausea        Antivertigo/Antiemetic Agents Passed - 7/15/2019 11:34 AM        Passed - Recent (12 mo) or future (30 days) visit within the authorizing provider's specialty     Patient had office visit in the last 12 months or has a visit in the next 30 days with authorizing provider or within the authorizing provider's specialty.  See \"Patient Info\" tab in inbasket, or \"Choose Columns\" in Meds & Orders section of the refill encounter.              Passed - Medication is active on med list        Passed - Patient is 18 years of age or older        "

## 2019-07-16 RX ORDER — ONDANSETRON 8 MG/1
8 TABLET, ORALLY DISINTEGRATING ORAL EVERY 8 HOURS PRN
Qty: 20 TABLET | Refills: 1 | Status: SHIPPED | OUTPATIENT
Start: 2019-07-16 | End: 2020-08-18

## 2019-07-16 NOTE — TELEPHONE ENCOUNTER
Called patient was out in [place] on trip to see friends 3 weeks ago , patient states she might have had food poisoning. She stated she was hyperactively vomiting. When patient has stomach issues, it is really bad states she has hyperactive vomiting. Patient is out of Zofran prescription and would like to have a new prescription for this to have on hand if this comes up again.    Patient is not actively having symptoms.     Pended medication & Pharmacy, please review. Thanks.     Routing refill request to provider for review/approval because:  Drug interaction warning: Aspartame and Zofran  Last refill was 3/31/2016

## 2019-07-16 NOTE — TELEPHONE ENCOUNTER
Last prescribed in 3/2016.  Need to follow up with patient regarding symptoms and request.  Fani BUNCH RN   [facility] [place]

## 2019-07-18 ENCOUNTER — THERAPY VISIT (OUTPATIENT)
Dept: PHYSICAL THERAPY | Facility: CLINIC | Age: 49
End: 2019-07-18
Payer: COMMERCIAL

## 2019-07-18 DIAGNOSIS — N94.10 DYSPAREUNIA, FEMALE: ICD-10-CM

## 2019-07-18 DIAGNOSIS — N39.46 MIXED INCONTINENCE: Primary | ICD-10-CM

## 2019-07-18 PROCEDURE — 97110 THERAPEUTIC EXERCISES: CPT | Mod: GP | Performed by: PHYSICAL THERAPIST

## 2019-07-18 PROCEDURE — 97140 MANUAL THERAPY 1/> REGIONS: CPT | Mod: GP | Performed by: PHYSICAL THERAPIST

## 2019-08-06 ENCOUNTER — TRANSFERRED RECORDS (OUTPATIENT)
Dept: HEALTH INFORMATION MANAGEMENT | Facility: CLINIC | Age: 49
End: 2019-08-06

## 2019-08-23 DIAGNOSIS — F33.1 MAJOR DEPRESSIVE DISORDER, RECURRENT EPISODE, MODERATE (H): ICD-10-CM

## 2019-08-23 DIAGNOSIS — F41.9 ANXIETY: ICD-10-CM

## 2019-08-23 NOTE — TELEPHONE ENCOUNTER
Routing refill request to provider for review/approval because:  Patient needs to be seen because:  Last OV 04/15/19  Please give small supply and send to team for scheduling      Instructions         Return in about 1 month (around 5/13/2019) for Depression/Anxiety Check.

## 2019-08-23 NOTE — TELEPHONE ENCOUNTER
PHQ-9 SCORE 7/13/2018 2/19/2019 4/15/2019   PHQ-9 Total Score - - -   PHQ-9 Total Score MyChart - - -   PHQ-9 Total Score 5 4 4

## 2019-08-23 NOTE — TELEPHONE ENCOUNTER
"Requested Prescriptions   Pending Prescriptions Disp Refills     venlafaxine (EFFEXOR-XR) 150 MG 24 hr capsule [Pharmacy Med Name: VENLAFAXINE ER 150MG CAPSULES] 90 capsule 0     Sig: TAKE 1 CAPSULE(150 MG) BY MOUTH DAILY       Serotonin-Norepinephrine Reuptake Inhibitors  Passed - 8/23/2019  6:16 AM        Passed - Blood pressure under 140/90 in past 12 months     BP Readings from Last 3 Encounters:   04/15/19 110/63   04/06/19 114/66   02/19/19 136/72                 Passed - PHQ-9 score of less than 5 in past 6 months     Please review last PHQ-9 score.           Passed - Medication is active on med list        Passed - Patient is age 18 or older        Passed - No active pregnancy on record        Passed - Normal serum creatinine on file in past 12 months     Recent Labs   Lab Test 04/15/19  1032   CR 0.71             Passed - No positive pregnancy test in past 12 months        Passed - Recent (6 mo) or future (30 days) visit within the authorizing provider's specialty     Patient had office visit in the last 6 months or has a visit in the next 30 days with authorizing provider or within the authorizing provider's specialty.  See \"Patient Info\" tab in inbasket, or \"Choose Columns\" in Meds & Orders section of the refill encounter.            Last Written Prescription Date:  5/13/2019  Last Fill Quantity: , 90 # refills:  0  Last office visit: 4/15/2019 with prescribing provider:   Asiya   Future Office Visit:      "

## 2019-08-26 RX ORDER — VENLAFAXINE HYDROCHLORIDE 150 MG/1
CAPSULE, EXTENDED RELEASE ORAL
Qty: 90 CAPSULE | Refills: 0 | Status: SHIPPED | OUTPATIENT
Start: 2019-08-26 | End: 2019-12-09

## 2019-08-26 NOTE — TELEPHONE ENCOUNTER
I had followed up with Milagro in May over [facility] but at that time had recommended a 3 month follow up. She is up to date on her Health Maintenance and her depressive symptoms seem well controlled so I'm not too worried about her needing to come in. I've sent a 90-day supply of Effexor.

## 2019-08-29 ENCOUNTER — OFFICE VISIT (OUTPATIENT)
Dept: SLEEP MEDICINE | Facility: CLINIC | Age: 49
End: 2019-08-29
Attending: INTERNAL MEDICINE
Payer: COMMERCIAL

## 2019-08-29 VITALS
DIASTOLIC BLOOD PRESSURE: 70 MMHG | HEART RATE: 78 BPM | OXYGEN SATURATION: 97 % | WEIGHT: 203 LBS | HEIGHT: 66 IN | BODY MASS INDEX: 32.62 KG/M2 | RESPIRATION RATE: 14 BRPM | SYSTOLIC BLOOD PRESSURE: 101 MMHG

## 2019-08-29 DIAGNOSIS — R06.83 SNORING: ICD-10-CM

## 2019-08-29 DIAGNOSIS — G47.9 SLEEP DISTURBANCE: ICD-10-CM

## 2019-08-29 DIAGNOSIS — R29.818 SUSPECTED SLEEP APNEA: Primary | ICD-10-CM

## 2019-08-29 PROCEDURE — 99204 OFFICE O/P NEW MOD 45 MIN: CPT | Performed by: INTERNAL MEDICINE

## 2019-08-29 ASSESSMENT — MIFFLIN-ST. JEOR: SCORE: 1562.55

## 2019-08-29 NOTE — PATIENT INSTRUCTIONS
Your BMI is Body mass index is 32.77 kg/m .  Weight management is a personal decision.  If you are interested in exploring weight loss strategies, the following discussion covers the approaches that may be successful. Body mass index (BMI) is one way to tell whether you are at a healthy weight, overweight, or obese. It measures your weight in relation to your height.  A BMI of 18.5 to 24.9 is in the healthy range. A person with a BMI of 25 to 29.9 is considered overweight, and someone with a BMI of 30 or greater is considered obese. More than two-thirds of American adults are considered overweight or obese.  Being overweight or obese increases the risk for further weight gain. Excess weight may lead to heart disease and diabetes.  Creating and following plans for healthy eating and physical activity may help you improve your health.  Weight control is part of healthy lifestyle and includes exercise, emotional health, and healthy eating habits. Careful eating habits lifelong are the mainstay of weight control. Though there are significant health benefits from weight loss, long-term weight loss with diet alone may be very difficult to achieve- studies show long-term success with dietary management in less than 10% of people. Attaining a healthy weight may be especially difficult to achieve in those with severe obesity. In some cases, medications, devices and surgical management might be considered.  What can you do?  If you are overweight or obese and are interested in methods for weight loss, you should discuss this with your provider.     Consider reducing daily calorie intake by 500 calories.     Keep a food journal.     Avoiding skipping meals, consider cutting portions instead.    Diet combined with exercise helps maintain muscle while optimizing fat loss. Strength training is particularly important for building and maintaining muscle mass. Exercise helps reduce stress, increase energy, and improves fitness.  Increasing exercise without diet control, however, may not burn enough calories to loose weight.       Start walking three days a week 10-20 minutes at a time    Work towards walking thirty minutes five days a week     Eventually, increase the speed of your walking for 1-2 minutes at time    In addition, we recommend that you review healthy lifestyles and methods for weight loss available through the National Institutes of Health patient information sites:  http://win.niddk.nih.gov/publications/index.htm    And look into health and wellness programs that may be available through your health insurance provider, employer, local community center, or [employer].    Weight management plan: Patient was referred to their PCP to discuss a diet and exercise plan.
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
## 2019-08-29 NOTE — NURSING NOTE
"Chief Complaint   Patient presents with     Sleep Problem     Snoring       Initial /70   Pulse 78   Resp 14   Ht 1.676 m (5' 6\")   Wt 92.1 kg (203 lb)   SpO2 97%   BMI 32.77 kg/m   Estimated body mass index is 32.77 kg/m  as calculated from the following:    Height as of this encounter: 1.676 m (5' 6\").    Weight as of this encounter: 92.1 kg (203 lb).    Medication Reconciliation: complete     ESS 5  Neck 42cm  Lela Frazier MA        "

## 2019-09-11 ENCOUNTER — MYC MEDICAL ADVICE (OUTPATIENT)
Dept: FAMILY MEDICINE | Facility: CLINIC | Age: 49
End: 2019-09-11

## 2019-09-12 ENCOUNTER — TRANSFERRED RECORDS (OUTPATIENT)
Dept: HEALTH INFORMATION MANAGEMENT | Facility: CLINIC | Age: 49
End: 2019-09-12

## 2019-09-12 NOTE — TELEPHONE ENCOUNTER
"Please see Hedyt response:    Hi, Dr. Braden,    I know what you mean - \"light\" is hardly the word I'd use for that much walking. :-)    The difficulty is in being stealthy. I'm supposed to be in a sort of \"secret \" mode, and the units on the [place] are very observant! Once they figure out who I am, I have to move on, as the data is skewed by people's knowledge of being observed. This obviously makes it tough to just sit and watch, since no one does that in hospital corridors.     So, I'll try the compression stockings. I'll also try to take the walking down a notch. If it keeps happening, I'll let you know.    Thank you so much for your help!    Kimberly Butler RN   [facility] - Triage     "

## 2019-09-12 NOTE — TELEPHONE ENCOUNTER
Please see Plink [employer] message and advise. Thank you very much.    Ilsa Burroughs RN, BSN  [facility] [place]

## 2019-09-25 ENCOUNTER — OFFICE VISIT (OUTPATIENT)
Dept: SLEEP MEDICINE | Facility: CLINIC | Age: 49
End: 2019-09-25
Payer: COMMERCIAL

## 2019-09-25 DIAGNOSIS — G47.9 SLEEP DISTURBANCE: ICD-10-CM

## 2019-09-25 DIAGNOSIS — R29.818 SUSPECTED SLEEP APNEA: ICD-10-CM

## 2019-09-25 DIAGNOSIS — R06.83 SNORING: ICD-10-CM

## 2019-09-25 PROCEDURE — G0399 HOME SLEEP TEST/TYPE 3 PORTA: HCPCS | Performed by: INTERNAL MEDICINE

## 2019-09-26 ENCOUNTER — DOCUMENTATION ONLY (OUTPATIENT)
Dept: SLEEP MEDICINE | Facility: CLINIC | Age: 49
End: 2019-09-26
Payer: COMMERCIAL

## 2019-09-26 NOTE — PROGRESS NOTES
This HSAT was performed using a Noxturnal T3 device which recorded snore, sound, movement activity, body position, nasal pressure, oronasal thermal airflow, pulse, oximetry and both chest and abdominal respiratory effort. HSAT data was restricted to the time patient states they were in bed.     HSAT was scored using 1B 4% hypopnea rule.     HST AHI (Non-PAT): 2.4  Snoring was reported as loud.  Time with SpO2 below 89% was 0 minutes.   Overall signal quality was good     Pt will follow up with sleep provider to determine appropriate therapy.

## 2019-09-27 NOTE — PROCEDURES
"HOME SLEEP STUDY INTERPRETATION    Patient: Milagro Frye  MRN: [id number]  YOB: 1970  Study Date: 9/25/2019  Referring Provider: Erna Braden;  Ordering Provider: Ashkan Gibbs MD     Indications for Home Study: Milagro Frye is a 49 year old female with a history of depression who presents with symptoms suggestive of obstructive sleep apnea.    Estimated body mass index is 32.77 kg/m  as calculated from the following:    Height as of 8/29/19: 1.676 m (5' 6\").    Weight as of 8/29/19: 92.1 kg (203 lb).  Total score - Bronson: 5 (8/29/2019 10:52 AM)  STOP-BANG: 3/8    Data: A full night home sleep study was performed recording the standard physiologic parameters including body position, movement, sound, nasal pressure, thermal oral airflow, chest and abdominal movements with respiratory inductance plethysmography, and oxygen saturation by pulse oximetry. Pulse rate was estimated by oximetry recording. This study was considered adequate based on > 4 hours of quality oximetry and respiratory recording. As specified by the AASM Manual for the Scoring of Sleep and Associated events, version 2.3, Rule VIII.D 1B, 4% oxygen desaturation scoring for hypopneas is used as a standard of care on all home sleep apnea testing.    Analysis Time:  595 minutes    Respiration:   Sleep Associated Hypoxemia: sustained hypoxemia was not present. Baseline oxygen saturation was 96%.  Time with saturation less than or equal to 88% was 0 minutes. The lowest oxygen saturation was 91%.   Snoring: Snoring was present.  Respiratory events: The home study revealed a presence of 15 obstructive apneas and 0 mixed and central apneas. There were 9 hypopneas resulting in a combined apnea/hypopnea index [AHI] of 2.4 events per hour.  AHI was 4 per hour supine, - per hour prone, 1 per hour on left side, and 1.2 per hour on right side.   Pattern: Excluding events noted above, respiratory rate and pattern was " Normal.    Position: Percent of time spent: supine - 45.5%, prone - 0%, on left - 20%, on right - 34%.    Heart Rate: By pulse oximetry normal rate was noted.     Assessment:   Negative for clinically significant sleep apnea. There was snoring but without clinically significant apneas or hypopneas. There was adequate monitoring in supine sleep sleep during this test.   Sleep associated hypoxemia was not present.      Diagnosis Code(s): Snoring R06.83    Ashkan Gibbs MD, MD, September 27, 2019   Diplomate, American Board of Psychiatry and Neurology, Sleep Medicine

## 2019-10-07 ENCOUNTER — VIRTUAL VISIT (OUTPATIENT)
Dept: SLEEP MEDICINE | Facility: CLINIC | Age: 49
End: 2019-10-07
Payer: COMMERCIAL

## 2019-10-07 DIAGNOSIS — R06.83 SNORING: Primary | ICD-10-CM

## 2019-10-07 PROCEDURE — 99441 ZZC PHYSICIAN TELEPHONE EVALUATION 5-10 MIN: CPT | Performed by: INTERNAL MEDICINE
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
## 2019-11-04 ENCOUNTER — HEALTH MAINTENANCE LETTER (OUTPATIENT)
Age: 49
End: 2019-11-04

## 2019-11-11 ENCOUNTER — OFFICE VISIT (OUTPATIENT)
Dept: PODIATRY | Facility: CLINIC | Age: 49
End: 2019-11-11
Payer: COMMERCIAL

## 2019-11-11 ENCOUNTER — ANCILLARY PROCEDURE (OUTPATIENT)
Dept: GENERAL RADIOLOGY | Facility: CLINIC | Age: 49
End: 2019-11-11
Attending: PODIATRIST
Payer: COMMERCIAL

## 2019-11-11 VITALS
DIASTOLIC BLOOD PRESSURE: 70 MMHG | SYSTOLIC BLOOD PRESSURE: 120 MMHG | BODY MASS INDEX: 32.62 KG/M2 | HEIGHT: 66 IN | WEIGHT: 203 LBS

## 2019-11-11 DIAGNOSIS — M79.671 RIGHT FOOT PAIN: ICD-10-CM

## 2019-11-11 DIAGNOSIS — G57.61 MORTON'S NEUROMA, RIGHT: ICD-10-CM

## 2019-11-11 DIAGNOSIS — Q66.70 PES CAVUS: ICD-10-CM

## 2019-11-11 DIAGNOSIS — M79.671 RIGHT FOOT PAIN: Primary | ICD-10-CM

## 2019-11-11 DIAGNOSIS — B35.3 TINEA PEDIS OF BOTH FEET: ICD-10-CM

## 2019-11-11 DIAGNOSIS — M77.8 CAPSULITIS OF FOOT, RIGHT: ICD-10-CM

## 2019-11-11 PROCEDURE — 99203 OFFICE O/P NEW LOW 30 MIN: CPT | Performed by: PODIATRIST

## 2019-11-11 PROCEDURE — 73630 X-RAY EXAM OF FOOT: CPT | Mod: RT

## 2019-11-11 RX ORDER — CICLOPIROX OLAMINE 7.7 MG/G
CREAM TOPICAL 2 TIMES DAILY
Qty: 30 G | Refills: 1 | Status: SHIPPED | OUTPATIENT
Start: 2019-11-11 | End: 2020-08-31

## 2019-11-11 ASSESSMENT — MIFFLIN-ST. JEOR: SCORE: 1562.55

## 2019-11-11 NOTE — PATIENT INSTRUCTIONS
Thank you for choosing [facility]!    DR. FITZGERALD'S CLINIC SCHEDULE  MONDAY AM - [place] TUESDAY - [place]   [address] [address] [place] [address] [address] [place] [address]   [phone] / [phone] / [phone]       WEDNESDAY - [place] FRIDAY AM - [facility]   [address]   [place] [address] [place] [address]   [phone] / [phone]       FRIDAY PM - [place] SCHEDULE SURGERY: [phone]   [address] BILLING QUESTIONS: [phone]   [place] [address] AFTER HOURS: [phone]   [phone] / [phone] APPOINTMENTS: [phone]     [employer] (CPL) [phone]     One month follow up      ATHLETE'S FOOT (TINEA PEDIS)  It is a rash that is caused by a fungus (most commonly Dermatophytes) in the outer layer of skin on the foot or in the nails. It can occur between the toes or on the instep and heel areas of the feet. Fungus will grow in warm and moist environments. The fungus that causes athlete's foot is contagious and you can get it from touching someone who has it of walking around bare foot around swimming pools, gyms, saunas, communal baths and showers, fitness centers or locker rooms.     SYMPTOMS  The symptoms of athlete's foot are numerous and include; itching, burning or stinging between the toes or on the soles of the feet, blisters, cracked and peeling skin, excessive dryness or excessive scaling on the bottom or sides of the feet, redness and softness with breaking down of the skin, especially between the toes, foot odor and thick, crumbly nails. Older males or people who live in warm humid environments are more prone to get it but it can develop at any age.    TREATMENT OPTIONS  Typically it can be treated with antifungal creams, lotions, ointments, sprays, or gels.  Many are available over the counter, some are prescription. It is  important that you use them long enough, typically 4 weeks, to clear the rash. If an infection is particularly bad, your provider may prescribe oral medication. It is important that you follow the directions for any medication carefully to prevent recurrence of the rash.    HOME TREATMENT  1.  Keep feet clean and dry  2.  Dry between your toes any time your feet become wet  3.  Wear socks that are made of cotton, wool, or fibers designed to wick moisture away  from skin. Nylon, lycra, and spandex tend to trap moisture.  4.  If you have blistering, you may soak your feet in Burow's solution (aluminum acetate is active ingredient. Domeboro is a brand sold at [employer]). This is available over the counter.  5.  Treat shows with antifungal powder  6.  Tea Tree oil may help reduce symptoms but does not cure the rash.    PREVENTION  1.  Change socks or stockings regularly.  2.  Use talcum powder on your feet if they sweat a lot.  3.  Do not borrow shoes.  4.  Let shoes dry out for 24 hours before wearing them again.  5.  Treat shoes with fungal powder  6.  Wear shoes that are well ventilated such as leather shoes or sandals.  Avoid shoes  made of synthetic materials such as vinyl or rubber.  7.  Wear water proof sandals when you are in public areas such as locker rooms, pools, communal baths, showers, saunas, and fitness centers.    FYI: Call or seek medical attention IMMEDIATELY if:  - You develop a fever over 100.4F for more than 24 hrs.  - There is a discharge of pus from infected areas.  - Red streaks develop from the affected areas  - Increase in redness, warmth, pain, swelling, or tenderness in the affected areas.        PSORIASIS OF THE NAILS  Psoriasis is a common chronic condition of the skin. A person with psoriasis typically has patches of raised, red skin with silvery scales. The affected skin may look shiny and red or even have pustules, depending on the type of psoriasis the individual has. These skin  changes usually occur on the elbows, knees, scalp, and trunk. In the [place], about 7 million people (2%-3% of people) have psoriasis. About 150,000-260,000 new cases are diagnosed each year. Psoriasis can also affect the fingernails and toenails, causing pitting and thickening and irregular contour of the nail.  Most people who have psoriasis of the nails also have skin psoriasis (cutaneous psoriasis). Only 5% of people with psoriasis of the nails do not have skin psoriasis. In people who have skin psoriasis, 10%-55% have psoriasis of the nails (also called psoriatic nail disease). About 10%-20% of people who have skin psoriasis also have psoriatic arthritis, a specific condition in which people have symptoms of both arthritis and psoriasis. Of people with psoriatic arthritis, 53%-86% have affected nails, often with pitting.If psoriasis of the nails is severe and is not treated, it can lead to functional and social problems.    CAUSES  Psoriasis is not contagious. How psoriasis of the nails develops is not completely known. It appears to result from a combination of genetic (inherited), immunologic, and environmental factors.  Psoriasis tends to run in families. If you have a parent or a sibling who has psoriasis, you have a 16%-25% chance of having psoriasis, too. If both of your parents have psoriasis, your risk is 75%. Males and females are equally likely to have psoriasis. Psoriasis can occur in people of all races.    SIGNS & SYMPTOMS  Ppeople who have nail psoriasis may also have skin symptoms. If you have psoriasis of the nails but do not have skin symptoms, the condition may be difficult for your doctor to diagnose. It is important to inform your doctor if you have a family history of psoriasis.  If you have psoriasis of the nails, you might notice the following signs:     Clear yellow-red nail discoloring that looks like a drop of blood or oil under the nail plate: This is known as an oil drop or a  salmon patch.    Little pits in your nails or pitting of the nail matrix. Pits develop when cells are lost from the nail's surface.     Lines going across the nails (side to side rather than root to tip): These are medically known as Beau lines of the nail.     Areas of white on your nail plate, otherwise known as leukonychia or midmatrix disease     Thickening of the skin under your nail: Your doctor may call this subungual hyperkeratosis. This can lead to loosening of the nail.     Loosening of the nail: This is medically referred to as onycholysis of the nail bed and nail hyponychium. Your nail may develop a white area where it is  from the skin underneath your nail. It usually starts at the tip of the nail and extends toward the root. The nail bed (skin under your nail) may become infected.     Crumbling of the nail: The nail weakens because the underlying structures are not healthy.     Tiny black lines in your nail in tip-to-cuticle direction: These are called splinter hemorrhages or dilated tortuous capillaries in the dermal papillae. This occurs when the tiny capillaries in the tip of your fingers bleed between the nail and the skin under the nail.     Redness of the pale arched area at the bottom of your nail: This is known as a spotted lunula. It occurs when the capillaries under the nail are congested.     Arthritis of the fingers with nail changes: The nails are affected in 53%-86% of people who have psoriatic arthritis.     Nail psoriasis can also occur together with fungal infections of the nail (onychomycosis) and inflammation of the skin around the edges of the nail (paronychia).      WHEN TO SEEK MEDICAL CARE  If you notice changes in your nails such as discoloration (white spots or yellowing areas) or pits or if your nails seem infected or are painful, see your doctor.    CLINICAL EXAMS/TESTING  If a person has known psoriasis and has characteristic changes of the nails, the diagnosis is  typically apparent. In some situations, the doctor may take a biopsy (a small sample) of the skin under your nail to determine if you have psoriasis of the nail.    TREATMENT  At present, psoriasis of the nails does not have a cure. The goal of treatment is to improve the function and appearance of your nails. If your nails have a fungal infection, your doctor will prescribe an antifungal medication.    SELF CARE AT HOME  In areas where your nail has loosened, gently trim the nail back to the skin where it is connected. This will allow your medications to work better. All nail care must be very gentle. Vigorous nail care can cause your psoriasis to flare. Attempt to avoid injuring your nails so that your nail doesn't loosen more and so that the area doesn't become infected.    MEDICAL TREATMENT  Your doctor may recommend that a part of your nail be removed, either chemically or surgically. Chemical removal of your nail involves putting an ointment on your nails for seven days. The nail comes off by itself with no bleeding.  Even with effective treatment, an improvement in symptoms may take a long time, since eight to 12 months can be needed to generate a new fingernail.    MEDICATIONS    Creams or ointments to rub on and around the nail, including steroid, topical vitamin A (tazarotene) or vitamin D derivatives (calcipotriene or calcitriol), antimetabolite drugs (such as 5-fluorouracil, which is also used to treat certain cancers) or, occasionally, antifungal solutions may be needed if a fungus infection is present. However, delivery of medications to the nail area is difficult because of the barrier presented by the nail plate. Topical treatments (applied directly to the nail) may not be effective in all cases.     Steroids applied to the skin under your nail or injected under the nail; injection under the nail may be more effective than when steroids are applied in cream or ointment form.     PUVA: This therapy is  a combination of the prescription medicine, psoralen, and exposure to UVA ultraviolet light.    Systemic therapy if you have both skin and arthritis symptoms or if your skin and nail symptoms are severe     SURGERY  If other treatments don't work, your doctor can surgically remove the nail. The area is numbed with a local anesthetic before the nail is removed.    PREVENTION  Psoriasis is strongly influenced by inherited genes, and prevention of psoriasis is not possible. However, to help prevent flares and involvement of the nails, keep the nails dry and protect them from damage.    PROGNOSIS  Nail psoriasis does not have a cure, but the treatments mentioned above may improve the appearance and function of your nails.            CAPSULITIS / METATARSALGIA  All joints in the body are surrounded by a capsule, or a covering of soft tissue and ligaments. The capsule holds bones together and secretes joint fluid to help lubricate the joint. If a joint capsule is exposed to excessive force, it can develop microscopic tears and become inflamed. This commonly occurs in the foot due to mild variation in anatomy. Hammertoes, bunions, irregular bone length, joint immobility, etc. can all lead to excessive force on the joint. Capsule injury can also occur due to repetitive stress from exercise, insufficient support from shoes, excessive bare foot walking and excessive weight.      Conservative treatments include ice, rest from the aggravating activity, weight loss, orthotic inserts, improving shoes and shoe modifications. You can purchase an over the counter felt metatarsal pad to place in your shoes at some [employer] or on [employer]. Appropriate shoes will protect the inflamed tissue improving the chances of healing. Avoidance of standing or walking barefoot, including around the house, is necessary to allow healing. Casts are sometimes used for more aggressive protection.  NSAIDs such as Advil are also used to help with pain  and decreasing inflammation. If pain continues over a period of weeks with continuous rest and icing, Corticosteroid injections can be a treatment option to try and help decrease inflammation.    Surgery is often necessary to correct the underlying structural problem. Surgery might include shortening an excessively long bone, repairing bunion or hammertoe, lengthening a tight Achilles  tendon, etc. These are same day surgeries that might be pursued if more conservative measures fail to provide relief.      The inflamed joint capsule has the potential to completely tear. This will allow the toe to drift off the ground, curving toward the other toes. The involved toe may under or overlap the adjacent toes as drift continues. The pain may improve after the joint tears or this new position will be permanent. Surgery can address the toe alignment. Your goal of treating capsulitis is to avoid this scenario.            CA'S NEUROMA   Ca's neuroma is an enlargement or thickening of a nerve in the foot. It is also sometimes referred to as an intermetatarsal neuroma, interdigital neuroma, Ca's metatarsalgia (pain in the metatarsal head area), alberto-neural fibrosis (scar tissue around a nerve) or entrapment neuropathy (abnormal nerve due to compression). A Ca's neuroma most commonly occurs in the third interspace between the third and fourth toes, followed by the second interspace between the second and third toes. Ca's neuromas have also occurred in the fourth and first interspaces, but these are rare. If you have a Ca's neuroma, there is a 15% chance it will occur bilaterally (on both feet). Ca's neuromas occur most commonly in women who are between 30 to 50 years old. The reason they are more common in women is thought to be due to the shoes women wear.   CAUSES: A Ca's neuroma is thought to be caused by trauma to the nerve, but scientists are still not sure about the exact cause of the  "trauma. The trauma may be caused by the metatarsal heads, the deep transverse intermetatarsal ligament (holds the metatarsal heads together) or an intermetatarsal bursa (fluid-filled sac). All of these structures can cause compression/trauma on the nerve which initially causes swelling and injury in the nerve. Over time if the compression/trauma continues, the nerve repairs itself with very fibrous tissue that leads to enlargement and thickening of the nerve. Other causes of trauma to the nerve may include; overpronation (foot rolls inward), hypermobility (too much motion), cavo varus (high arch foot) and excessive dorsiflexion (toes bend upward) of the toes. These biomechanical (howthe foot moves) factors may cause trauma to the nerve with every step. If the nerve becomes irritated and enlarged then it takes up more space and gets even more compressed and irritated. It becomes a vicious cycle.   SIGNS & SYMPTOMS  - Pain (sharp, stabbing, throbbing, shooting)   - Numbness   - Tingling or \"pins & needles\"   - Burning   - Cramping   - Feeling that you are stepping on something or that something is in your shoe   - Initially the symptoms may happen once in a while, but as the condition gets     worse, the symptoms may happen all of the time   - It usually feels better by taking off your shoe and massaging your foot     DIAGNOSIS: Your podiatrist will ask many questions about your signs and symptoms and will perform a physical exam. Some of the exams may include a web space compression test. This is done by squeezing the metatarsals together with one hand and using the thumb and index finger of the other hand to compress the affected web space to reproduce the pain/symptoms. A palpable click (Denver's click) is usually present. This test may also cause pain to shoot into the toes and that is called a Tinel's sign. Nicolette's test involves squeezing the metatarsals together and moving the toes up and down for 30 seconds. " This will usually cause pain or it will bring on your other symptoms. Harris's sign is positive when you stand and the affected toes spread apart. A Ca's neuroma is usually diagnosed based on the history and physical exam findings, but sometimes other tests such as an x-ray, ultrasound or an MRI are needed.   TREATMENT  1.  Footwear Changes: Wear shoes that are wide and deep in the toe box so they  do not put pressure on your toes and metatarsals. Avoid wearing high heels because they cause increased pressure on the ball of your foot (forefoot).    2.  Metatarsal Pads: These help to lift and separate the metatarsal heads to take pressure off of the nerve. They are placed just behind where you feel the pain, not on top of the painful spot.   3.  Activity modification: For example, you may try swimming instead of running until your symptoms go away.   4.  Taping   5.  Icing   6.  NSAIDs (anti-inflammatories): aleve, ibuprofen, etc.   7.  Arch Supports or Orthotics: These help to control some of the abnormal motion in your feet. The abnormal motion can lead to extra torque and pressure on the nerve.   8.  Physical Therapy  9.  Cortisone Injection: Helps to decrease the size of the irritated, enlarged nerve.   10.  Sclerosing Alcohol Injection: Helps to destroy the nerve chemically, which causes permanent numbness    SURGERY  If conservative treatment does not help surgery may be needed. Surgery may involve cutting out the nerve or cutting the intermetatarsalligament. Studies have shown surgery has an 80-85% success rate.  Will result in numbness    PREVENTION  -Avoid wearing narrow, pointed toe shoes, or high heels        BODY WEIGHT AND YOUR FEET  The following information is included in the after visit summary for all patients. Body weight can be a sensitive issue to discuss in clinic, but we think the following information is very important. Although we focus on the feet and ankles, we do support the overall  health of our patients.     Many things can cause foot and ankle problems. Foot structure, activity level, foot mechanics and injuries are common causes of pain. One very important issue that often goes unmentioned, is body weight. Extra weight can cause increased stress on muscles, ligaments, bones and tendons. Sometimes just a few extra pounds is all it takes to put one over her/his threshold. Without reducing that stress, it can be difficult to alleviate pain. As Foot [facility] Ankle specialists, our job is addressing the lower extremity problem and possible causes. Regarding extra body weight, we encourage patients to discuss diet and weight management plans with their primary care doctors. It is this team approach that gives you the best opportunity for pain relief and getting you back on your feet.      [facility] has a Comprehensive Weight Management Program. This program includes counseling, education, non-surgical and surgical approaches to weight loss. If you are interested in learning more either talk to you primary care provider or call [phone].
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
## 2019-12-05 DIAGNOSIS — F41.9 ANXIETY: ICD-10-CM

## 2019-12-05 DIAGNOSIS — F33.1 MAJOR DEPRESSIVE DISORDER, RECURRENT EPISODE, MODERATE (H): ICD-10-CM

## 2019-12-05 NOTE — TELEPHONE ENCOUNTER
Patient due for updated PHQ9. Sent [employer] message to do PHQ9 screening.     Ilsa Burroughs RN, BSN  [facility] [place]

## 2019-12-05 NOTE — TELEPHONE ENCOUNTER
"Requested Prescriptions   Pending Prescriptions Disp Refills     venlafaxine (EFFEXOR-XR) 150 MG 24 hr capsule [Pharmacy Med Name: VENLAFAXINE ER 150MG CAPSULES] 90 capsule 0     Sig: TAKE 1 CAPSULE(150 MG) BY MOUTH DAILY       Serotonin-Norepinephrine Reuptake Inhibitors  Failed - 12/5/2019 12:14 PM        Failed - PHQ-9 score of less than 5 in past 6 months     Please review last PHQ-9 score.      PHQ-9 SCORE 7/13/2018 2/19/2019 4/15/2019   PHQ-9 Total Score - - -   PHQ-9 Total Score MyChart - - -   PHQ-9 Total Score 5 4 4        Failed - Recent (6 mo) or future (30 days) visit within the authorizing provider's specialty     Patient had office visit in the last 6 months or has a visit in the next 30 days with authorizing provider or within the authorizing provider's specialty.  See \"Patient Info\" tab in inbasket, or \"Choose Columns\" in Meds & Orders section of the refill encounter.            Passed - Blood pressure under 140/90 in past 12 months     BP Readings from Last 3 Encounters:   11/11/19 120/70   08/29/19 101/70   04/15/19 110/63                 Passed - Medication is active on med list        Passed - Patient is age 18 or older        Passed - No active pregnancy on record        Passed - Normal serum creatinine on file in past 12 months     Recent Labs   Lab Test 04/15/19  1032   CR 0.71             Passed - No positive pregnancy test in past 12 months        venlafaxine (EFFEXOR-XR) 150 MG 24 hr capsule 90 capsule 0 8/26/2019       Last Written Prescription Date:  8/26/2019  Last Fill Quantity: 90,  # refills: 0   Last office visit: 4/15/2019 with prescribing provider:  Dr. Braden   Future Office Visit:  Unknown     "

## 2019-12-06 NOTE — TELEPHONE ENCOUNTER
2nd attempt: Left a non-detailed message and call back number [phone].     Ilsa Burroughs RN, BSN  [facility] [place]

## 2019-12-09 ENCOUNTER — OFFICE VISIT (OUTPATIENT)
Dept: FAMILY MEDICINE | Facility: CLINIC | Age: 49
End: 2019-12-09
Payer: COMMERCIAL

## 2019-12-09 VITALS
SYSTOLIC BLOOD PRESSURE: 112 MMHG | WEIGHT: 201 LBS | BODY MASS INDEX: 32.3 KG/M2 | HEIGHT: 66 IN | DIASTOLIC BLOOD PRESSURE: 70 MMHG | HEART RATE: 87 BPM | OXYGEN SATURATION: 98 % | TEMPERATURE: 97.6 F

## 2019-12-09 DIAGNOSIS — F41.9 ANXIETY: ICD-10-CM

## 2019-12-09 DIAGNOSIS — F33.1 MAJOR DEPRESSIVE DISORDER, RECURRENT EPISODE, MODERATE (H): Primary | ICD-10-CM

## 2019-12-09 DIAGNOSIS — B35.3 TINEA PEDIS OF RIGHT FOOT: ICD-10-CM

## 2019-12-09 PROCEDURE — 99213 OFFICE O/P EST LOW 20 MIN: CPT | Performed by: PHYSICIAN ASSISTANT

## 2019-12-09 RX ORDER — TRIAMCINOLONE ACETONIDE 1 MG/G
CREAM TOPICAL 2 TIMES DAILY
Qty: 30 G | Refills: 1 | Status: SHIPPED | OUTPATIENT
Start: 2019-12-09 | End: 2021-07-07

## 2019-12-09 RX ORDER — VENLAFAXINE HYDROCHLORIDE 150 MG/1
CAPSULE, EXTENDED RELEASE ORAL
Qty: 90 CAPSULE | Refills: 1 | Status: SHIPPED | OUTPATIENT
Start: 2019-12-09 | End: 2020-04-17

## 2019-12-09 RX ORDER — VENLAFAXINE HYDROCHLORIDE 150 MG/1
CAPSULE, EXTENDED RELEASE ORAL
Qty: 90 CAPSULE | Refills: 0 | Status: SHIPPED | OUTPATIENT
Start: 2019-12-09 | End: 2020-01-22

## 2019-12-09 ASSESSMENT — ANXIETY QUESTIONNAIRES
1. FEELING NERVOUS, ANXIOUS, OR ON EDGE: SEVERAL DAYS
7. FEELING AFRAID AS IF SOMETHING AWFUL MIGHT HAPPEN: SEVERAL DAYS
IF YOU CHECKED OFF ANY PROBLEMS ON THIS QUESTIONNAIRE, HOW DIFFICULT HAVE THESE PROBLEMS MADE IT FOR YOU TO DO YOUR WORK, TAKE CARE OF THINGS AT HOME, OR GET ALONG WITH OTHER PEOPLE: SOMEWHAT DIFFICULT
6. BECOMING EASILY ANNOYED OR IRRITABLE: NOT AT ALL
5. BEING SO RESTLESS THAT IT IS HARD TO SIT STILL: NOT AT ALL
3. WORRYING TOO MUCH ABOUT DIFFERENT THINGS: SEVERAL DAYS
GAD7 TOTAL SCORE: 5
2. NOT BEING ABLE TO STOP OR CONTROL WORRYING: SEVERAL DAYS

## 2019-12-09 ASSESSMENT — PATIENT HEALTH QUESTIONNAIRE - PHQ9
SUM OF ALL RESPONSES TO PHQ QUESTIONS 1-9: 3
5. POOR APPETITE OR OVEREATING: SEVERAL DAYS

## 2019-12-09 ASSESSMENT — MIFFLIN-ST. JEOR: SCORE: 1553.48
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
## 2019-12-09 NOTE — TELEPHONE ENCOUNTER
PHQ-9 SCORE 2/19/2019 4/15/2019 12/9/2019   PHQ-9 Total Score - - -   PHQ-9 Total Score MyChart - - -   PHQ-9 Total Score 4 4 3    Routing refill request to provider for review/approval because:  Please see updated PHQ9 screening. Per protocol patient would be due for 6 month follow up.     Ilsa Burroughs RN, BSN  [facility] [place]

## 2019-12-10 ASSESSMENT — ANXIETY QUESTIONNAIRES: GAD7 TOTAL SCORE: 5

## 2020-01-20 ENCOUNTER — APPOINTMENT (OUTPATIENT)
Dept: CARDIOLOGY | Facility: CLINIC | Age: 50
End: 2020-01-20
Attending: PHYSICIAN ASSISTANT
Payer: COMMERCIAL

## 2020-01-20 ENCOUNTER — APPOINTMENT (OUTPATIENT)
Dept: GENERAL RADIOLOGY | Facility: CLINIC | Age: 50
End: 2020-01-20
Attending: EMERGENCY MEDICINE
Payer: COMMERCIAL

## 2020-01-20 ENCOUNTER — HOSPITAL ENCOUNTER (OUTPATIENT)
Facility: CLINIC | Age: 50
Setting detail: OBSERVATION
Discharge: HOME | End: 2020-01-20
Attending: EMERGENCY MEDICINE | Admitting: EMERGENCY MEDICINE
Payer: COMMERCIAL

## 2020-01-20 VITALS
SYSTOLIC BLOOD PRESSURE: 125 MMHG | DIASTOLIC BLOOD PRESSURE: 69 MMHG | HEART RATE: 83 BPM | WEIGHT: 200 LBS | BODY MASS INDEX: 32.14 KG/M2 | RESPIRATION RATE: 16 BRPM | TEMPERATURE: 98.4 F | OXYGEN SATURATION: 95 % | HEIGHT: 66 IN

## 2020-01-20 DIAGNOSIS — Z82.49 FAMILY HISTORY OF CORONARY ARTERY DISEASE: ICD-10-CM

## 2020-01-20 DIAGNOSIS — R07.9 CHEST PAIN, UNSPECIFIED TYPE: ICD-10-CM

## 2020-01-20 DIAGNOSIS — F32.89 MINOR DEPRESSIVE DISORDER: ICD-10-CM

## 2020-01-20 DIAGNOSIS — E78.5 HYPERLIPIDEMIA, UNSPECIFIED HYPERLIPIDEMIA TYPE: ICD-10-CM

## 2020-01-20 LAB
ANION GAP SERPL CALCULATED.3IONS-SCNC: 4 MMOL/L (ref 3–14)
BASOPHILS # BLD AUTO: 0 10E9/L (ref 0–0.2)
BASOPHILS NFR BLD AUTO: 0.1 %
BUN SERPL-MCNC: 23 MG/DL (ref 7–30)
CALCIUM SERPL-MCNC: 8.7 MG/DL (ref 8.5–10.1)
CHLORIDE SERPL-SCNC: 108 MMOL/L (ref 94–109)
CO2 SERPL-SCNC: 28 MMOL/L (ref 20–32)
CREAT SERPL-MCNC: 0.73 MG/DL (ref 0.52–1.04)
DIFFERENTIAL METHOD BLD: NORMAL
EOSINOPHIL # BLD AUTO: 0 10E9/L (ref 0–0.7)
EOSINOPHIL NFR BLD AUTO: 0.1 %
ERYTHROCYTE [DISTWIDTH] IN BLOOD BY AUTOMATED COUNT: 11.9 % (ref 10–15)
GFR SERPL CREATININE-BSD FRML MDRD: >90 ML/MIN/{1.73_M2}
GLUCOSE SERPL-MCNC: 85 MG/DL (ref 70–99)
HCT VFR BLD AUTO: 38.7 % (ref 35–47)
HGB BLD-MCNC: 12.6 G/DL (ref 11.7–15.7)
IMM GRANULOCYTES # BLD: 0 10E9/L (ref 0–0.4)
IMM GRANULOCYTES NFR BLD: 0.1 %
INTERPRETATION ECG - MUSE: NORMAL
LYMPHOCYTES # BLD AUTO: 2.2 10E9/L (ref 0.8–5.3)
LYMPHOCYTES NFR BLD AUTO: 30.5 %
MCH RBC QN AUTO: 29.4 PG (ref 26.5–33)
MCHC RBC AUTO-ENTMCNC: 32.6 G/DL (ref 31.5–36.5)
MCV RBC AUTO: 90 FL (ref 78–100)
MONOCYTES # BLD AUTO: 0.6 10E9/L (ref 0–1.3)
MONOCYTES NFR BLD AUTO: 8.3 %
NEUTROPHILS # BLD AUTO: 4.3 10E9/L (ref 1.6–8.3)
NEUTROPHILS NFR BLD AUTO: 60.9 %
NRBC # BLD AUTO: 0 10*3/UL
NRBC BLD AUTO-RTO: 0 /100
PLATELET # BLD AUTO: 184 10E9/L (ref 150–450)
POTASSIUM SERPL-SCNC: 3.7 MMOL/L (ref 3.4–5.3)
RBC # BLD AUTO: 4.28 10E12/L (ref 3.8–5.2)
SODIUM SERPL-SCNC: 140 MMOL/L (ref 133–144)
TROPONIN I SERPL-MCNC: <0.015 UG/L (ref 0–0.04)
WBC # BLD AUTO: 7.1 10E9/L (ref 4–11)

## 2020-01-20 PROCEDURE — 85025 COMPLETE CBC W/AUTO DIFF WBC: CPT | Performed by: FAMILY MEDICINE

## 2020-01-20 PROCEDURE — 93321 DOPPLER ECHO F-UP/LMTD STD: CPT | Mod: 26 | Performed by: INTERNAL MEDICINE

## 2020-01-20 PROCEDURE — 25000125 ZZHC RX 250: Performed by: INTERNAL MEDICINE

## 2020-01-20 PROCEDURE — 93010 ELECTROCARDIOGRAM REPORT: CPT | Mod: Z6 | Performed by: EMERGENCY MEDICINE

## 2020-01-20 PROCEDURE — 25500064 ZZH RX 255 OP 636: Performed by: INTERNAL MEDICINE

## 2020-01-20 PROCEDURE — 25000128 H RX IP 250 OP 636: Performed by: INTERNAL MEDICINE

## 2020-01-20 PROCEDURE — 84484 ASSAY OF TROPONIN QUANT: CPT | Mod: 91 | Performed by: EMERGENCY MEDICINE

## 2020-01-20 PROCEDURE — 99285 EMERGENCY DEPT VISIT HI MDM: CPT | Mod: 25 | Performed by: EMERGENCY MEDICINE

## 2020-01-20 PROCEDURE — 80048 BASIC METABOLIC PNL TOTAL CA: CPT | Performed by: FAMILY MEDICINE

## 2020-01-20 PROCEDURE — 99236 HOSP IP/OBS SAME DATE HI 85: CPT | Mod: Z6 | Performed by: NURSE PRACTITIONER

## 2020-01-20 PROCEDURE — 93018 CV STRESS TEST I&R ONLY: CPT | Performed by: INTERNAL MEDICINE

## 2020-01-20 PROCEDURE — 84484 ASSAY OF TROPONIN QUANT: CPT | Performed by: FAMILY MEDICINE

## 2020-01-20 PROCEDURE — 93016 CV STRESS TEST SUPVJ ONLY: CPT | Performed by: INTERNAL MEDICINE

## 2020-01-20 PROCEDURE — G0378 HOSPITAL OBSERVATION PER HR: HCPCS

## 2020-01-20 PROCEDURE — 93350 STRESS TTE ONLY: CPT | Mod: TC

## 2020-01-20 PROCEDURE — 93325 DOPPLER ECHO COLOR FLOW MAPG: CPT | Mod: 26 | Performed by: INTERNAL MEDICINE

## 2020-01-20 PROCEDURE — 71046 X-RAY EXAM CHEST 2 VIEWS: CPT

## 2020-01-20 PROCEDURE — 93350 STRESS TTE ONLY: CPT | Mod: 26 | Performed by: INTERNAL MEDICINE

## 2020-01-20 PROCEDURE — 84484 ASSAY OF TROPONIN QUANT: CPT | Performed by: PHYSICIAN ASSISTANT

## 2020-01-20 RX ORDER — NITROGLYCERIN 0.4 MG/1
0.4 TABLET SUBLINGUAL EVERY 5 MIN PRN
Status: DISCONTINUED | OUTPATIENT
Start: 2020-01-20 | End: 2020-01-20 | Stop reason: HOSPADM

## 2020-01-20 RX ORDER — LIDOCAINE 40 MG/G
CREAM TOPICAL
Status: DISCONTINUED | OUTPATIENT
Start: 2020-01-20 | End: 2020-01-20 | Stop reason: HOSPADM

## 2020-01-20 RX ORDER — ATORVASTATIN CALCIUM 20 MG/1
20 TABLET, FILM COATED ORAL DAILY
Status: DISCONTINUED | OUTPATIENT
Start: 2020-01-20 | End: 2020-01-20 | Stop reason: HOSPADM

## 2020-01-20 RX ORDER — NALOXONE HYDROCHLORIDE 0.4 MG/ML
.1-.4 INJECTION, SOLUTION INTRAMUSCULAR; INTRAVENOUS; SUBCUTANEOUS
Status: DISCONTINUED | OUTPATIENT
Start: 2020-01-20 | End: 2020-01-20 | Stop reason: HOSPADM

## 2020-01-20 RX ORDER — SODIUM CHLORIDE 9 MG/ML
INJECTION, SOLUTION INTRAVENOUS CONTINUOUS
Status: DISCONTINUED | OUTPATIENT
Start: 2020-01-20 | End: 2020-01-20 | Stop reason: HOSPADM

## 2020-01-20 RX ORDER — CETIRIZINE HYDROCHLORIDE 10 MG/1
10 TABLET ORAL EVERY EVENING
Status: DISCONTINUED | OUTPATIENT
Start: 2020-01-20 | End: 2020-01-20 | Stop reason: HOSPADM

## 2020-01-20 RX ORDER — METOPROLOL TARTRATE 1 MG/ML
1-20 INJECTION, SOLUTION INTRAVENOUS
Status: DISCONTINUED | OUTPATIENT
Start: 2020-01-20 | End: 2020-01-20 | Stop reason: HOSPADM

## 2020-01-20 RX ORDER — DOBUTAMINE HYDROCHLORIDE 200 MG/100ML
10-50 INJECTION INTRAVENOUS CONTINUOUS
Status: DISCONTINUED | OUTPATIENT
Start: 2020-01-20 | End: 2020-01-20 | Stop reason: HOSPADM

## 2020-01-20 RX ORDER — ACETAMINOPHEN 325 MG/1
650 TABLET ORAL EVERY 4 HOURS PRN
Status: DISCONTINUED | OUTPATIENT
Start: 2020-01-20 | End: 2020-01-20 | Stop reason: HOSPADM

## 2020-01-20 RX ORDER — ATROPINE SULFATE 0.4 MG/ML
.2-2 AMPUL (ML) INJECTION
Status: DISCONTINUED | OUTPATIENT
Start: 2020-01-20 | End: 2020-01-20 | Stop reason: HOSPADM

## 2020-01-20 RX ORDER — VENLAFAXINE HYDROCHLORIDE 150 MG/1
150 CAPSULE, EXTENDED RELEASE ORAL
Status: DISCONTINUED | OUTPATIENT
Start: 2020-01-20 | End: 2020-01-20 | Stop reason: HOSPADM

## 2020-01-20 RX ADMIN — METOPROLOL TARTRATE 2 MG: 1 INJECTION, SOLUTION INTRAVENOUS at 10:58

## 2020-01-20 RX ADMIN — DOBUTAMINE HYDROCHLORIDE 10 MCG/KG/MIN: 200 INJECTION INTRAVENOUS at 10:45

## 2020-01-20 RX ADMIN — PERFLUTREN 6 ML: 6.52 INJECTION, SUSPENSION INTRAVENOUS at 10:58

## 2020-01-20 ASSESSMENT — ENCOUNTER SYMPTOMS
CHILLS: 1
SHORTNESS OF BREATH: 0
NAUSEA: 0
FEVER: 0
DIAPHORESIS: 1
COUGH: 0

## 2020-01-20 ASSESSMENT — MIFFLIN-ST. JEOR: SCORE: 1548.94
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
## 2020-01-20 NOTE — PLAN OF CARE
Observation goals:      - Serial troponins and stress test complete. Troponin negative x 3. Patient off floor for stress test.    - Seen and cleared by consultant if applicable. No.   - Adequate pain control on oral analgesia. Yes.   - Vital signs normal or at patient baseline,Yes.   - Safe disposition plan has been identified. No.  - Nurse to notify provider when observation goals have been met and patient is ready for discharge.
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
## 2020-01-20 NOTE — PLAN OF CARE
All goals met for discharge. Discharge instructions given to patient and all questions answered. Patient able to verbalize understanding of instructions. PIV discontinued.All belongings with patient. Patient discharged to home.

## 2020-01-20 NOTE — ED NOTES
Observation Brochure and Video    Patient informed of observation status based on provider's order.  Observation brochure was given and the video watched. Patient/Family stated understanding. Questions answered.    Ale Solorzano RN

## 2020-01-20 NOTE — PROGRESS NOTES
Pt here for dobutamine stress test. Test, meds and side effects reviewed with patient. Achieved target HR at 30 mcg/kg/min Dobutamine and given total of 2mg IV Metoprolol to bring HR back to baseline. Post monitoring complete and VSS. Pt transferred back to  via wheelchair and hospital transport.

## 2020-01-20 NOTE — DISCHARGE SUMMARY
Discharge Summary    Milagro Frye MRN# [id number]   YOB: 1970 Age: 49 year old     Date of Admission:  1/20/2020  Date of Discharge:  1/20/2020  Admitting Physician:  Geovanny Marquez MD  Discharge Physician:  Dr. Crooks  Discharging Service:  Emergency Medicine     Primary Provider: Erna Braden          Discharge Diagnosis:   Non cardiac chest pain             Discharge Disposition:   Discharged to home           Condition on Discharge:   Discharge condition: Stable   Code status on discharge: Full Code           Procedures:   No procedures performed during this admission          Discharge Medications:     Current Discharge Medication List      CONTINUE these medications which have NOT CHANGED    Details   atorvastatin (LIPITOR) 20 MG tablet Take 1 tablet (20 mg) by mouth daily  Qty: 90 tablet, Refills: 3    Associated Diagnoses: Hyperlipidemia LDL goal <100      !! venlafaxine (EFFEXOR-XR) 150 MG 24 hr capsule TAKE 1 CAPSULE(150 MG) BY MOUTH DAILY  Qty: 90 capsule, Refills: 0    Associated Diagnoses: Major depressive disorder, recurrent episode, moderate (H); Anxiety      Acetaminophen (TYLENOL PO)       cetirizine (ZYRTEC) 10 MG tablet Take 1 tablet (10 mg) by mouth every evening  Qty: 30 tablet, Refills: 1      ciclopirox (LOPROX) 0.77 % cream Apply topically 2 times daily Apply to feet 2x a day  Qty: 30 g, Refills: 1    Associated Diagnoses: Right foot pain; Tinea pedis of both feet; Pes cavus; Capsulitis of foot, right; Ca's neuroma, right      meclizine (ANTIVERT) 25 MG tablet Take 1 tablet (25 mg) by mouth 4 times daily as needed for dizziness  Qty: 30 tablet, Refills: 0      nitroglycerin (NITRODUR) 0.1 MG/HR Refills: 0      ondansetron (ZOFRAN ODT) 8 MG ODT tab Take 1 tablet (8 mg) by mouth every 8 hours as needed for nausea  Qty: 20 tablet, Refills: 1    Associated Diagnoses: BPPV (benign paroxysmal positional vertigo), left      order for DME Equipment being  ordered:  Short cast boot  Qty: 1 Device, Refills: 0    Associated Diagnoses: Right foot pain; Tinea pedis of both feet; Pes cavus; Capsulitis of foot, right; Ca's neuroma, right      triamcinolone (KENALOG) 0.1 % external cream Apply topically 2 times daily  Qty: 30 g, Refills: 1    Associated Diagnoses: Tinea pedis of right foot      !! venlafaxine (EFFEXOR-XR) 150 MG 24 hr capsule TAKE 1 CAPSULE(150 MG) BY MOUTH DAILY  Qty: 90 capsule, Refills: 1    Associated Diagnoses: Major depressive disorder, recurrent episode, moderate (H); Anxiety       !! - Potential duplicate medications found. Please discuss with provider.                Consultations:   No consultations were requested during this admission             Brief History of Illness:   Please see detailed H&P from 2/20/2020, in brief: Milagro Frye is a 49 year old female with history of HLD and depression presenting to the ER with complaint of acute chest pain.           Hospital Course:   1. Chest pain: acute onset of left sided chest pain radiating to the left arm, shoulder, and jaw while at work this evening. 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 Pain is non exertional. Did have associated diaphoresis. No SOB, nausea, or abdominal pain. Denies hx of DVT/PE. No hx of cardiac disease. She did have CT coronary in 5/2019 low calcium score in the LAD. In the ED, VSS. Lab: BMP and CBC unremarkable. Glucose 85. Chest xray is clear. Trop x 2 negative. EKG, no ischemia. Pt's chest pain resolved in the ED. Given the concerning presenting symptoms, as well as family history of CAD and personal hx of HLD, she was admitted for ACS r/o. She had a brief episode of recurrent pain on admission to the observation unit, described as stabbing in nature and radiating into left arm and neck.  Resolved spontaneously. She is agreeable to dobutamine stress echo.  Serial troponins  "negative X 3. She underwent a dobutamine stress echo and had symptoms during this, but the test was normal.  Patient was given all results and instructed to follow up with her PCP in one week.  Return if the patient has new or worsening chest pain, SOB, nausea, or lightheadedness. Patient was in agreement with the plan and was discharged to home in good condition     Chronic problems  #HLD- continue with PTA Atorvastatin  #Depression- continue with PTA Venlafaxine              Final Day of Progress before Discharge:       Physical Exam:  Blood pressure 125/69, pulse 83, temperature 98.4  F (36.9  C), temperature source Oral, resp. rate 16, height 1.676 m (5' 6\"), weight 90.7 kg (200 lb), SpO2 95 %, not currently breastfeeding.    EXAM:  Exam:  Constitutional: healthy, alert and no distress  Cardiovascular: No lifts, heaves, or thrills. RRR. No murmurs, clicks gallops or rub  Respiratory: Good diaphragmatic excursion. Lungs clear  Gastrointestinal: Abdomen soft, non-tender. BS normal. No masses, organomegaly  Musculoskeletal: extremities normal- no gross deformities noted, gait normal and normal muscle tone  Skin: no suspicious lesions or rashes  Neurologic: Gait normal. Alert and oriented.   Psychiatric: mentation appears normal and affect normal/bright    /69 (BP Location: Left arm)   Pulse 83   Temp 98.4  F (36.9  C) (Oral)   Resp 16   Ht 1.676 m (5' 6\")   Wt 90.7 kg (200 lb)   SpO2 95%   BMI 32.28 kg/m               Data:  All laboratory data reviewed             Significant Results:     Results for orders placed or performed during the hospital encounter of 01/20/20   CBC with platelets differential     Status: None   Result Value Ref Range    WBC 7.1 4.0 - 11.0 10e9/L    RBC Count 4.28 3.8 - 5.2 10e12/L    Hemoglobin 12.6 11.7 - 15.7 g/dL    Hematocrit 38.7 35.0 - 47.0 %    MCV 90 78 - 100 fl    MCH 29.4 26.5 - 33.0 pg    MCHC 32.6 31.5 - 36.5 g/dL    RDW 11.9 10.0 - 15.0 %    Platelet Count 184 " 150 - 450 10e9/L    Diff Method Automated Method     % Neutrophils 60.9 %    % Lymphocytes 30.5 %    % Monocytes 8.3 %    % Eosinophils 0.1 %    % Basophils 0.1 %    % Immature Granulocytes 0.1 %    Nucleated RBCs 0 0 /100    Absolute Neutrophil 4.3 1.6 - 8.3 10e9/L    Absolute Lymphocytes 2.2 0.8 - 5.3 10e9/L    Absolute Monocytes 0.6 0.0 - 1.3 10e9/L    Absolute Eosinophils 0.0 0.0 - 0.7 10e9/L    Absolute Basophils 0.0 0.0 - 0.2 10e9/L    Abs Immature Granulocytes 0.0 0 - 0.4 10e9/L    Absolute Nucleated RBC 0.0    Basic metabolic panel     Status: None   Result Value Ref Range    Sodium 140 133 - 144 mmol/L    Potassium 3.7 3.4 - 5.3 mmol/L    Chloride 108 94 - 109 mmol/L    Carbon Dioxide 28 20 - 32 mmol/L    Anion Gap 4 3 - 14 mmol/L    Glucose 85 70 - 99 mg/dL    Urea Nitrogen 23 7 - 30 mg/dL    Creatinine 0.73 0.52 - 1.04 mg/dL    GFR Estimate >90 >60 mL/min/[1.73_m2]    GFR Estimate If Black >90 >60 mL/min/[1.73_m2]    Calcium 8.7 8.5 - 10.1 mg/dL   Troponin I     Status: None   Result Value Ref Range    Troponin I ES <0.015 0.000 - 0.045 ug/L   Troponin I     Status: None   Result Value Ref Range    Troponin I ES <0.015 0.000 - 0.045 ug/L   Troponin I     Status: None   Result Value Ref Range    Troponin I ES <0.015 0.000 - 0.045 ug/L   EKG 12 lead     Status: None   Result Value Ref Range    Interpretation ECG Click View Image link to view waveform and result       Recent Results (from the past 48 hour(s))   Chest XR,  PA & LAT    Narrative    EXAM: XR CHEST 2 VW  LOCATION: [facility]  DATE/TIME: 1/20/2020 2:17 AM    INDICATION: Chest pain.  COMPARISON: 05/01/2015.      Impression    IMPRESSION: No focal air-space disease or other acute findings. Normal heart size.   Dobutamine Stress Echocardiogram    Narrative    [id number]  [id number]  [id number]  [id number]^KWADWO^LINDA^LARISA           [facility],[place]  Echocardiography Laboratory  [address] [place] [address]     Name: SADIE AGUILA  MRN: [id number]  : 1970  Study Date: 2020 10:07 AM  Age: 49 yrs  Gender: Female  Patient Location: [facility]  Reason For Study: Chest Pain  Ordering Physician: LINDA BURKETT  Performed By: Romero Fuentes     HR: 86  BP: 126/56 mmHg  _____________________________________________________________________________  __     _____________________________________________________________________________  __        Interpretation Summary  Normal, low-risk dobutamine echocardiogram without evidence of ischemia at a  diagnostic workload (87% MPHR.)     The target heart rate was achieved.  Normal biventricular size, thickness, and global systolic function at  baseline, LVEF=60-65%.  With low-dose dobutamine, LVEF augmented and LV cavity size decreased  appropriately.  With peak dobutamine, LVEF increased further to >70% and LV cavity size  decreased appropriately.  No regional wall motion abnormalities at rest or with dobutamine.  Patient reported chest pressure (5/10 severity) with dobutamine infusion which  resolved in the recovery phase, which was not associated with regional wall  motion abnormalities or ECG changes.  No ECG evidence of ischemia. Normal heart rate and blood pressure response to  dobutamine.  No significant valvular abnormalities are noted on screening Doppler exam.  The aortic root and visualized ascending aorta are normal  _____________________________________________________________________________  __  Stress  The patient exhibited chest pain during the drug infusion.  The drug infusion was stopped due to target heart rate achieved.  The maximum dose of dobutamine was 30mcg/kg/min.  The maximum dose of atropine was 0.0mg.  The maximum dose of metoprolol was 2.0mg.  Definity (NDC #44733-871-80) given intravenously.  Patient was given 6ml mixture of 1.5ml Definity and 8.5ml saline.  4 ml wasted.  Definity Lot # [id number] .  Definity Expiration 2020 .         Stress Results                                       Maximum Predicted HR:   171 bpm             Target HR: 145 bpm        % Maximum Predicted HR: 87 %                   Stage    Heart Rate  BP  Dose         Comment                           (bpm)              Baseline       86    126/56              Low dose      105    142/6220.005/10 chest pressure            Peak exercise   149    107/5530.005/10 chest pressure              Recovery       96    162/68     Chest pressure resolved.                            Stress Duration:   9:10 mm:ss *                      Maximum Stress HR: 149 bpm     Procedure  Dobutamine stress echo with two dimensional color and spectral Doppler  performed.  _____________________________________________________________________________  __                                Report approved by: Linda Ornelas 01/20/2020 11:27 AM                    _____________________________________________________________________________  __                   Pending Results:   Unresulted Labs Ordered in the Past 30 Days of this Admission     No orders found from 12/21/2019 to 1/21/2020.                  Discharge Instructions and Follow-Up:     Discharge Procedure Orders   Reason for your hospital stay   Order Comments: You were admitted to the observation unit for evaluation of your chest pain.  You had no EKG changes, labs checking for heart damage were negative, your chest x-ray was normal.  You underwent a stress test and this was normal.     Activity   Order Comments: Your activity upon discharge: activity as tolerated     Order Specific Question Answer Comments   Is discharge order? Yes      When to contact your care team   Order Comments: Return to the ER if you have new or worsening chest pain, shortness of breath, jaw or arm pain, or fainting.     Adult [facility] Follow-up and recommended labs and tests   Order Comments: Follow up with primary care provider, Erna Braden, within 7 days  for hospital follow- up.  No follow up labs or test are needed.      Appointments on [place] and/or [facility] (with [facility] or [facility] provider or service). Call [phone] if you haven't heard regarding these appointments within 7 days of discharge.     Diet   Order Comments: Follow this diet upon discharge: Orders Placed This Encounter      Regular Diet Adult     Order Specific Question Answer Comments   Is discharge order? Yes           Attestation:  STARLA Alcantar CNP.

## 2020-01-22 ENCOUNTER — TELEPHONE (OUTPATIENT)
Dept: FAMILY MEDICINE | Facility: CLINIC | Age: 50
End: 2020-01-22

## 2020-01-22 NOTE — TELEPHONE ENCOUNTER
"Hospital/TCU/ED for chronic condition Discharge Protocol    \"Hi, my name is Niyah Cam RN, a registered nurse, and I am calling from [facility].  I am calling to follow up and see how things are going for you after your recent emergency visit/hospital/TCU stay.\"    Tell me how you are doing now that you are home?\" 1-2 very short episodes of chest pain since discharge from hospital.       Discharge Instructions    \"Let's review your discharge instructions.  What is/are the follow-up recommendations?  Pt. Response: Dr. Braden    \"Has an appointment with your primary care provider been scheduled?\"   No (schedule appointment)    \"When you see the provider, I would recommend that you bring your medications with you.\"    Medications    \"Tell me what changed about your medicines when you discharged?\"    Changes to chronic meds?    0-1    \"What questions do you have about your medications?\"    None     New diagnoses of heart failure, COPD, diabetes, or MI?    No              Post Discharge Medication Reconciliation Status: discharge medications reconciled, continue medications without change.    Was MTM referral placed (*Make sure to put transitions as reason for referral)?   No    Call Summary    \"What questions or concerns do you have about your recent visit and your follow-up care?\"     none    \"If you have questions or things don't continue to improve, we encourage you contact us through the main clinic number (give number).  Even if the clinic is not open, triage nurses are available 24/7 to help you.     We would like you to know that our clinic has extended hours (provide information).  We also have urgent care (provide details on closest location and hours/contact info)\"      \"Thank you for your time and take care!\"  Niyah Cam RN             "

## 2020-01-24 ENCOUNTER — OFFICE VISIT (OUTPATIENT)
Dept: FAMILY MEDICINE | Facility: CLINIC | Age: 50
End: 2020-01-24
Payer: COMMERCIAL

## 2020-01-24 VITALS
OXYGEN SATURATION: 98 % | BODY MASS INDEX: 32.62 KG/M2 | WEIGHT: 203 LBS | SYSTOLIC BLOOD PRESSURE: 132 MMHG | DIASTOLIC BLOOD PRESSURE: 72 MMHG | HEART RATE: 93 BPM | RESPIRATION RATE: 14 BRPM | TEMPERATURE: 98.5 F | HEIGHT: 66 IN

## 2020-01-24 DIAGNOSIS — R07.89 ATYPICAL CHEST PAIN: Primary | ICD-10-CM

## 2020-01-24 PROCEDURE — 99214 OFFICE O/P EST MOD 30 MIN: CPT | Performed by: PHYSICIAN ASSISTANT

## 2020-01-24 ASSESSMENT — MIFFLIN-ST. JEOR: SCORE: 1562.55
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
## 2020-02-26 ENCOUNTER — OFFICE VISIT (OUTPATIENT)
Dept: FAMILY MEDICINE | Facility: CLINIC | Age: 50
End: 2020-02-26
Payer: COMMERCIAL

## 2020-02-26 ENCOUNTER — ANCILLARY PROCEDURE (OUTPATIENT)
Dept: GENERAL RADIOLOGY | Facility: CLINIC | Age: 50
End: 2020-02-26
Attending: INTERNAL MEDICINE
Payer: COMMERCIAL

## 2020-02-26 VITALS
HEART RATE: 89 BPM | WEIGHT: 203 LBS | DIASTOLIC BLOOD PRESSURE: 80 MMHG | SYSTOLIC BLOOD PRESSURE: 122 MMHG | TEMPERATURE: 97.7 F | BODY MASS INDEX: 32.77 KG/M2

## 2020-02-26 DIAGNOSIS — M25.532 LEFT WRIST PAIN: ICD-10-CM

## 2020-02-26 DIAGNOSIS — M25.532 LEFT WRIST PAIN: Primary | ICD-10-CM

## 2020-02-26 PROCEDURE — 99214 OFFICE O/P EST MOD 30 MIN: CPT | Performed by: INTERNAL MEDICINE

## 2020-02-26 PROCEDURE — 73110 X-RAY EXAM OF WRIST: CPT | Mod: LT
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
## 2020-04-01 ENCOUNTER — MYC MEDICAL ADVICE (OUTPATIENT)
Dept: FAMILY MEDICINE | Facility: CLINIC | Age: 50
End: 2020-04-01

## 2020-04-01 DIAGNOSIS — M25.532 LEFT WRIST PAIN: Primary | ICD-10-CM

## 2020-04-01 NOTE — TELEPHONE ENCOUNTER
Please see [employer] message and advise. Thank you very much.    Ilsa Burroughs RN, BSN  [facility] [place]

## 2020-04-14 NOTE — TELEPHONE ENCOUNTER
Please see [employer] message and advise. Thank you very much.    Ilsa Burroughs RN, BSN  [facility] [place]

## 2020-04-17 ENCOUNTER — VIRTUAL VISIT (OUTPATIENT)
Dept: FAMILY MEDICINE | Facility: CLINIC | Age: 50
End: 2020-04-17
Payer: COMMERCIAL

## 2020-04-17 DIAGNOSIS — F41.9 ANXIETY: ICD-10-CM

## 2020-04-17 DIAGNOSIS — F33.1 MAJOR DEPRESSIVE DISORDER, RECURRENT EPISODE, MODERATE (H): ICD-10-CM

## 2020-04-17 DIAGNOSIS — E78.5 HYPERLIPIDEMIA LDL GOAL <100: ICD-10-CM

## 2020-04-17 PROCEDURE — 99214 OFFICE O/P EST MOD 30 MIN: CPT | Mod: TEL | Performed by: INTERNAL MEDICINE

## 2020-04-17 RX ORDER — VENLAFAXINE HYDROCHLORIDE 150 MG/1
CAPSULE, EXTENDED RELEASE ORAL
Qty: 90 CAPSULE | Refills: 1 | Status: SHIPPED | OUTPATIENT
Start: 2020-04-17 | End: 2020-10-30

## 2020-04-17 RX ORDER — LIDOCAINE 50 MG/G
OINTMENT TOPICAL PRN
Qty: 50 G | Refills: 1 | Status: SHIPPED | OUTPATIENT
Start: 2020-04-17 | End: 2021-07-07

## 2020-04-17 RX ORDER — ATORVASTATIN CALCIUM 20 MG/1
20 TABLET, FILM COATED ORAL DAILY
Qty: 90 TABLET | Refills: 3 | Status: SHIPPED | OUTPATIENT
Start: 2020-04-17 | End: 2021-04-26

## 2020-04-17 RX ORDER — GABAPENTIN 300 MG/1
300 CAPSULE ORAL AT BEDTIME
Qty: 90 CAPSULE | Refills: 0 | Status: SHIPPED | OUTPATIENT
Start: 2020-04-17 | End: 2020-07-15

## 2020-04-17 ASSESSMENT — ANXIETY QUESTIONNAIRES
1. FEELING NERVOUS, ANXIOUS, OR ON EDGE: MORE THAN HALF THE DAYS
7. FEELING AFRAID AS IF SOMETHING AWFUL MIGHT HAPPEN: MORE THAN HALF THE DAYS
IF YOU CHECKED OFF ANY PROBLEMS ON THIS QUESTIONNAIRE, HOW DIFFICULT HAVE THESE PROBLEMS MADE IT FOR YOU TO DO YOUR WORK, TAKE CARE OF THINGS AT HOME, OR GET ALONG WITH OTHER PEOPLE: VERY DIFFICULT
5. BEING SO RESTLESS THAT IT IS HARD TO SIT STILL: MORE THAN HALF THE DAYS
GAD7 TOTAL SCORE: 17
2. NOT BEING ABLE TO STOP OR CONTROL WORRYING: NEARLY EVERY DAY
3. WORRYING TOO MUCH ABOUT DIFFERENT THINGS: NEARLY EVERY DAY
6. BECOMING EASILY ANNOYED OR IRRITABLE: MORE THAN HALF THE DAYS

## 2020-04-17 ASSESSMENT — PATIENT HEALTH QUESTIONNAIRE - PHQ9
5. POOR APPETITE OR OVEREATING: NEARLY EVERY DAY
SUM OF ALL RESPONSES TO PHQ QUESTIONS 1-9: 16
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
## 2020-04-18 ASSESSMENT — ANXIETY QUESTIONNAIRES: GAD7 TOTAL SCORE: 17

## 2020-05-29 ENCOUNTER — TRANSFERRED RECORDS (OUTPATIENT)
Dept: HEALTH INFORMATION MANAGEMENT | Facility: CLINIC | Age: 50
End: 2020-05-29

## 2020-06-03 ENCOUNTER — NURSE TRIAGE (OUTPATIENT)
Dept: FAMILY MEDICINE | Facility: CLINIC | Age: 50
End: 2020-06-03

## 2020-06-03 NOTE — TELEPHONE ENCOUNTER
Reason for Call:  Other call back    Detailed comments: Pt calling to speak with a nurse. Around 30 min or so ago she punctured her hand with a knife in her garden. States it is probably full of bacteria, so Pt is wondering if she should come in for a tetanus booster? Says last one of 10/21/17.    Phone Number Patient can be reached at: Cell number on file:    Telephone Information:   Mobile [phone]       Best Time: any    Can we leave a detailed message on this number? YES    Call taken on 6/3/2020 at 4:05 PM by Devi Luke

## 2020-06-03 NOTE — TELEPHONE ENCOUNTER
Patient poked her cuticle with a pocket knife while opening a bag of soil. Patient states that she cleaned wound well and has already stopped bleeding.  Last Tetanus 10/21/17.  Patient agrees to continue to keep wound clean and monitor for infection.     Additional Information    Negative: Wound looks infected    Negative: Caused by animal bite    Negative: Amputation    Negative: High-pressure injection injury (e.g., from paint gun, usually work-related)    Negative: Looks like a broken bone or dislocated joint (e.g., crooked or deformed)    Negative: Skin is split open or gaping (length > 1/2 inch or 12 mm)    Negative: Cut or scrape is very deep (e.g., can see bone or tendons)    Negative: Bleeding won't stop after 10 minutes of direct pressure (using correct technique)    Negative: Dirt in the wound and not removed after 15 minutes of scrubbing    Negative: Cut with numbness (loss of sensation) of finger    Negative: Fingernail is partially torn from a crush injury (EXCEPTION: torn nail from catching it on something)    Negative: Sounds like a serious injury to the triager    Negative: Looks infected (e.g., spreading redness, pus, red streak)    Negative: Fingernail is completely torn off    Negative: Base of fingernail has popped out from under skin fold (cuticle)    Negative: SEVERE pain (e.g., excruciating)    Negative: MODERATE-SEVERE pain and blood present under the nail (usually > 50% of nail bed)    Negative: Finger joint can't be opened (straightened) or closed (bent) completely    Negative: No prior tetanus shots (or is not fully vaccinated) and any wound (e.g., cut or scrape)    Negative: Patient wants to be seen    Negative: Injury interferes with work or school    Negative: Last tetanus shot > 5 years ago and DIRTY cut or scrape    Negative: Last tetanus shot >10 years ago and CLEAN cut or scrape    Negative: Suspicious history for the injury    Minor finger injury    Negative: Injury and pain has  "not improved after 3 days    Negative: Injury is still painful or swollen after 2 weeks    Answer Assessment - Initial Assessment Questions  1. MECHANISM: \"How did the injury happen?\"       Pocket knife into cuticle of middle finger of right hand while opening bag of soil  2. ONSET: \"When did the injury happen?\" (Minutes or hours ago)       1/2 hour ago  3. LOCATION: \"What part of the finger is injured?\" \"Is the nail damaged?\"       Middle finger  4. APPEARANCE of the INJURY: \"What does the injury look like?\"       Puncture wound. Has already stopped bleeding  5. SEVERITY: \"Can you use the hand normally?\"  \"Can you bend your fingers into a ball and then fully open them?\"      Can use hand normalling   6. SIZE: For cuts, bruises, or swelling, ask: \"How large is it?\" (e.g., inches or centimeters;  entire finger)       small  7. PAIN: \"Is there pain?\" If so, ask: \"How bad is the pain?\"    (e.g., Scale 1-10; or mild, moderate, severe)       A littel  8. TETANUS: For any breaks in the skin, ask: \"When was the last tetanus booster?\"      Last Tetanus booster 10/2017  9. OTHER SYMPTOMS: \"Do you have any other symptoms?\"      no  10. PREGNANCY: \"Is there any chance you are pregnant?\" \"When was your last menstrual period?\"        no    Protocols used: FINGER INJURY-A-[place]  Niyah Cam RN    "

## 2020-06-08 ENCOUNTER — TRANSFERRED RECORDS (OUTPATIENT)
Dept: HEALTH INFORMATION MANAGEMENT | Facility: CLINIC | Age: 50
End: 2020-06-08

## 2020-07-07 ENCOUNTER — APPOINTMENT (OUTPATIENT)
Dept: FAMILY MEDICINE | Facility: CLINIC | Age: 50
End: 2020-07-07
Payer: COMMERCIAL

## 2020-07-15 DIAGNOSIS — M25.532 LEFT WRIST PAIN: ICD-10-CM

## 2020-07-15 RX ORDER — GABAPENTIN 300 MG/1
CAPSULE ORAL
Qty: 90 CAPSULE | Refills: 0 | Status: SHIPPED | OUTPATIENT
Start: 2020-07-15 | End: 2021-06-08

## 2020-07-15 NOTE — TELEPHONE ENCOUNTER
Routing refill request to provider for review/approval because:  Drug not on the [facility] refill protocol     Ilsa Burroughs RN, BSN  [facility] [place]

## 2020-08-15 DIAGNOSIS — H81.12 BPPV (BENIGN PAROXYSMAL POSITIONAL VERTIGO), LEFT: ICD-10-CM

## 2020-08-18 RX ORDER — ONDANSETRON 8 MG/1
TABLET, ORALLY DISINTEGRATING ORAL
Qty: 20 TABLET | Refills: 0 | Status: SHIPPED | OUTPATIENT
Start: 2020-08-18 | End: 2022-11-16

## 2020-08-18 NOTE — TELEPHONE ENCOUNTER
Pres[place] per [facility] [place].    Karen ZHANG RN  EP Triage

## 2020-08-31 ENCOUNTER — HOSPITAL ENCOUNTER (OUTPATIENT)
Dept: ULTRASOUND IMAGING | Facility: CLINIC | Age: 50
Discharge: HOME | End: 2020-08-31
Attending: INTERNAL MEDICINE | Admitting: INTERNAL MEDICINE
Payer: COMMERCIAL

## 2020-08-31 ENCOUNTER — OFFICE VISIT (OUTPATIENT)
Dept: FAMILY MEDICINE | Facility: CLINIC | Age: 50
End: 2020-08-31
Payer: COMMERCIAL

## 2020-08-31 VITALS
BODY MASS INDEX: 31.5 KG/M2 | TEMPERATURE: 97.6 F | WEIGHT: 196 LBS | SYSTOLIC BLOOD PRESSURE: 114 MMHG | HEIGHT: 66 IN | OXYGEN SATURATION: 98 % | HEART RATE: 75 BPM | DIASTOLIC BLOOD PRESSURE: 74 MMHG

## 2020-08-31 DIAGNOSIS — E78.5 HYPERLIPIDEMIA LDL GOAL <100: ICD-10-CM

## 2020-08-31 DIAGNOSIS — F43.10 PTSD (POST-TRAUMATIC STRESS DISORDER): ICD-10-CM

## 2020-08-31 DIAGNOSIS — F41.9 ACUTE ANXIETY: ICD-10-CM

## 2020-08-31 DIAGNOSIS — Z00.00 ROUTINE GENERAL MEDICAL EXAMINATION AT A HEALTH CARE FACILITY: Primary | ICD-10-CM

## 2020-08-31 DIAGNOSIS — F33.1 MAJOR DEPRESSIVE DISORDER, RECURRENT EPISODE, MODERATE (H): ICD-10-CM

## 2020-08-31 DIAGNOSIS — Z12.11 SCREENING FOR COLON CANCER: ICD-10-CM

## 2020-08-31 DIAGNOSIS — Z83.49 FAMILY HISTORY OF THYROID DISEASE: ICD-10-CM

## 2020-08-31 DIAGNOSIS — Z91.410 HISTORY OF SEXUAL ABUSE IN ADULTHOOD: ICD-10-CM

## 2020-08-31 DIAGNOSIS — B35.3 TINEA PEDIS OF BOTH FEET: ICD-10-CM

## 2020-08-31 DIAGNOSIS — Z12.31 ENCOUNTER FOR SCREENING MAMMOGRAM FOR BREAST CANCER: ICD-10-CM

## 2020-08-31 DIAGNOSIS — Z12.4 SCREENING FOR CERVICAL CANCER: ICD-10-CM

## 2020-08-31 DIAGNOSIS — E04.9 GOITER: ICD-10-CM

## 2020-08-31 DIAGNOSIS — Q66.70 PES CAVUS: ICD-10-CM

## 2020-08-31 LAB
ALBUMIN SERPL-MCNC: 3.6 G/DL (ref 3.4–5)
ALP SERPL-CCNC: 121 U/L (ref 40–150)
ALT SERPL W P-5'-P-CCNC: 24 U/L (ref 0–50)
ANION GAP SERPL CALCULATED.3IONS-SCNC: 8 MMOL/L (ref 3–14)
AST SERPL W P-5'-P-CCNC: 23 U/L (ref 0–45)
BILIRUB SERPL-MCNC: 0.3 MG/DL (ref 0.2–1.3)
BUN SERPL-MCNC: 16 MG/DL (ref 7–30)
CALCIUM SERPL-MCNC: 9.1 MG/DL (ref 8.5–10.1)
CHLORIDE SERPL-SCNC: 106 MMOL/L (ref 94–109)
CHOLEST SERPL-MCNC: 193 MG/DL
CO2 SERPL-SCNC: 25 MMOL/L (ref 20–32)
CREAT SERPL-MCNC: 0.69 MG/DL (ref 0.52–1.04)
ERYTHROCYTE [DISTWIDTH] IN BLOOD BY AUTOMATED COUNT: 12 % (ref 10–15)
GFR SERPL CREATININE-BSD FRML MDRD: >90 ML/MIN/{1.73_M2}
GLUCOSE SERPL-MCNC: 88 MG/DL (ref 70–99)
HCT VFR BLD AUTO: 42.8 % (ref 35–47)
HDLC SERPL-MCNC: 60 MG/DL
HGB BLD-MCNC: 14.4 G/DL (ref 11.7–15.7)
LDLC SERPL CALC-MCNC: 106 MG/DL
MCH RBC QN AUTO: 30.1 PG (ref 26.5–33)
MCHC RBC AUTO-ENTMCNC: 33.6 G/DL (ref 31.5–36.5)
MCV RBC AUTO: 89 FL (ref 78–100)
NONHDLC SERPL-MCNC: 133 MG/DL
PLATELET # BLD AUTO: 205 10E9/L (ref 150–450)
POTASSIUM SERPL-SCNC: 3.8 MMOL/L (ref 3.4–5.3)
PROT SERPL-MCNC: 7.6 G/DL (ref 6.8–8.8)
RBC # BLD AUTO: 4.79 10E12/L (ref 3.8–5.2)
SODIUM SERPL-SCNC: 139 MMOL/L (ref 133–144)
TRIGL SERPL-MCNC: 133 MG/DL
TSH SERPL DL<=0.005 MIU/L-ACNC: 3.01 MU/L (ref 0.4–4)
WBC # BLD AUTO: 5.4 10E9/L (ref 4–11)

## 2020-08-31 PROCEDURE — 80053 COMPREHEN METABOLIC PANEL: CPT | Performed by: INTERNAL MEDICINE

## 2020-08-31 PROCEDURE — 99214 OFFICE O/P EST MOD 30 MIN: CPT | Mod: 25 | Performed by: INTERNAL MEDICINE

## 2020-08-31 PROCEDURE — G0145 SCR C/V CYTO,THINLAYER,RESCR: HCPCS | Performed by: INTERNAL MEDICINE

## 2020-08-31 PROCEDURE — 87624 HPV HI-RISK TYP POOLED RSLT: CPT | Performed by: INTERNAL MEDICINE

## 2020-08-31 PROCEDURE — 36415 COLL VENOUS BLD VENIPUNCTURE: CPT | Performed by: INTERNAL MEDICINE

## 2020-08-31 PROCEDURE — 99396 PREV VISIT EST AGE 40-64: CPT | Performed by: INTERNAL MEDICINE

## 2020-08-31 PROCEDURE — 80061 LIPID PANEL: CPT | Performed by: INTERNAL MEDICINE

## 2020-08-31 PROCEDURE — 85027 COMPLETE CBC AUTOMATED: CPT | Performed by: INTERNAL MEDICINE

## 2020-08-31 PROCEDURE — 84443 ASSAY THYROID STIM HORMONE: CPT | Performed by: INTERNAL MEDICINE

## 2020-08-31 PROCEDURE — 76536 US EXAM OF HEAD AND NECK: CPT

## 2020-08-31 RX ORDER — LORAZEPAM 1 MG/1
1 TABLET ORAL DAILY PRN
Qty: 10 TABLET | Refills: 0 | Status: SHIPPED | OUTPATIENT
Start: 2020-08-31 | End: 2020-09-14

## 2020-08-31 RX ORDER — CICLOPIROX OLAMINE 7.7 MG/G
CREAM TOPICAL 2 TIMES DAILY
Qty: 30 G | Refills: 1 | Status: SHIPPED | OUTPATIENT
Start: 2020-08-31 | End: 2021-07-07

## 2020-08-31 ASSESSMENT — PATIENT HEALTH QUESTIONNAIRE - PHQ9
SUM OF ALL RESPONSES TO PHQ QUESTIONS 1-9: 10
10. IF YOU CHECKED OFF ANY PROBLEMS, HOW DIFFICULT HAVE THESE PROBLEMS MADE IT FOR YOU TO DO YOUR WORK, TAKE CARE OF THINGS AT HOME, OR GET ALONG WITH OTHER PEOPLE: SOMEWHAT DIFFICULT
SUM OF ALL RESPONSES TO PHQ QUESTIONS 1-9: 10

## 2020-08-31 ASSESSMENT — MIFFLIN-ST. JEOR: SCORE: 1519.31
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
## 2020-09-03 LAB
COPATH REPORT: NORMAL
PAP: NORMAL

## 2020-09-09 LAB
FINAL DIAGNOSIS: NORMAL
HPV HR 12 DNA CVX QL NAA+PROBE: NEGATIVE
HPV16 DNA SPEC QL NAA+PROBE: NEGATIVE
HPV18 DNA SPEC QL NAA+PROBE: NEGATIVE
SPECIMEN DESCRIPTION: NORMAL
SPECIMEN SOURCE CVX/VAG CYTO: NORMAL

## 2020-09-11 ENCOUNTER — TELEPHONE (OUTPATIENT)
Dept: FAMILY MEDICINE | Facility: CLINIC | Age: 50
End: 2020-09-11

## 2020-09-11 NOTE — TELEPHONE ENCOUNTER
Prior Authorization Approval    Authorization Effective Date: 9/11/2020  Authorization Expiration Date: 9/11/2022  Medication: vortioxetine (TRINTELLIX) 5 MG tablet   Approved Dose/Quantity:    Reference #:     Insurance Company: [employer] - Phone [phone] Fax [phone]  Expected CoPay:       CoPay Card Available:      Foundation Assistance Needed:    Which Pharmacy is filling the prescription (Not needed for infusion/clinic administered): [id number] [address] AT [place] [address] & [facility]  Pharmacy Notified: Yes  Patient Notified: Yes  **Instructed pharmacy to notify patient when script is ready to /ship.**

## 2020-09-11 NOTE — TELEPHONE ENCOUNTER
Prior Authorization Retail Medication Request    Medication/Dose: vortioxetine (TRINTELLIX) 5 MG tablet   ICD code (if different than what is on RX):    Previously Tried and Failed:    Rationale:      Insurance Name:  NA  Insurance ID:  [id number]      Pharmacy Information (if different than what is on RX)  Name:  same  Phone:  same

## 2020-09-11 NOTE — TELEPHONE ENCOUNTER
Central Prior Authorization Team   Phone: [phone]      PA Initiation    Medication: vortioxetine (TRINTELLIX) 5 MG tablet   Insurance Company: [employer] - Phone [phone] Fax [phone]  Pharmacy Filling the Rx: [id number] - [place] [id number] AT [facility] [address] & [facility] [address]  Filling Pharmacy Phone: [phone]  Filling Pharmacy Fax:    Start Date: 9/11/2020

## 2020-09-16 ENCOUNTER — HOSPITAL ENCOUNTER (OUTPATIENT)
Dept: MAMMOGRAPHY | Facility: CLINIC | Age: 50
Discharge: HOME | End: 2020-09-16
Attending: INTERNAL MEDICINE | Admitting: INTERNAL MEDICINE
Payer: COMMERCIAL

## 2020-09-16 DIAGNOSIS — Z12.31 ENCOUNTER FOR SCREENING MAMMOGRAM FOR BREAST CANCER: ICD-10-CM

## 2020-09-16 PROCEDURE — 77063 BREAST TOMOSYNTHESIS BI: CPT

## 2020-10-08 DIAGNOSIS — F33.1 MAJOR DEPRESSIVE DISORDER, RECURRENT EPISODE, MODERATE (H): ICD-10-CM

## 2020-10-08 DIAGNOSIS — F41.9 ACUTE ANXIETY: ICD-10-CM

## 2020-10-09 NOTE — TELEPHONE ENCOUNTER
Routing refill request to provider for review/approval because:  Patient needs to be seen because:          08/31/2020 Erna Braden MD Office Visit  Return in about 4 weeks (around 9/28/2020) for Depression/Anxiety Check via E-visit.       PHQ 12/9/2019 4/17/2020 8/31/2020   PHQ-9 Total Score 3 16 10   Q9: Thoughts of better off dead/self-harm past 2 weeks Not at all Not at all Not at all   Niyah Cam RN

## 2020-10-10 ENCOUNTER — MYC REFILL (OUTPATIENT)
Dept: FAMILY MEDICINE | Facility: CLINIC | Age: 50
End: 2020-10-10

## 2020-10-10 DIAGNOSIS — F41.9 ACUTE ANXIETY: ICD-10-CM

## 2020-10-10 DIAGNOSIS — F33.1 MAJOR DEPRESSIVE DISORDER, RECURRENT EPISODE, MODERATE (H): ICD-10-CM

## 2020-10-10 RX ORDER — VORTIOXETINE 5 MG/1
TABLET, FILM COATED ORAL
Qty: 30 TABLET | Refills: 0 | Status: SHIPPED | OUTPATIENT
Start: 2020-10-10 | End: 2020-11-16

## 2020-10-28 DIAGNOSIS — F41.9 ANXIETY: ICD-10-CM

## 2020-10-28 DIAGNOSIS — F33.1 MAJOR DEPRESSIVE DISORDER, RECURRENT EPISODE, MODERATE (H): ICD-10-CM

## 2020-10-30 RX ORDER — VENLAFAXINE HYDROCHLORIDE 150 MG/1
CAPSULE, EXTENDED RELEASE ORAL
Qty: 90 CAPSULE | Refills: 1 | Status: SHIPPED | OUTPATIENT
Start: 2020-10-30 | End: 2020-11-16

## 2020-10-30 NOTE — TELEPHONE ENCOUNTER
Routing refill request to provider for review/approval because:  Pt has updated PHQ 9 for panel management    PHQ-9 SCORE 4/17/2020 8/31/2020 10/30/2020   PHQ-9 Total Score - - -   PHQ-9 Total Score MyChart - 10 (Moderate depression) 6 (Mild depression)   PHQ-9 Total Score 16 10 6     SELENA 7 score 4    Karen ZHANG RN  EP Triage

## 2020-11-13 ENCOUNTER — MYC MEDICAL ADVICE (OUTPATIENT)
Dept: FAMILY MEDICINE | Facility: CLINIC | Age: 50
End: 2020-11-13

## 2020-11-13 DIAGNOSIS — F33.1 MAJOR DEPRESSIVE DISORDER, RECURRENT EPISODE, MODERATE (H): ICD-10-CM

## 2020-11-13 DIAGNOSIS — F41.9 ACUTE ANXIETY: ICD-10-CM

## 2020-11-14 NOTE — TELEPHONE ENCOUNTER
Please see [employer] message and advise.      Thank you,  Genie OCONNORRN BSN  [facility]  [phone]

## 2020-11-16 RX ORDER — VENLAFAXINE HYDROCHLORIDE 37.5 MG/1
37.5 CAPSULE, EXTENDED RELEASE ORAL DAILY
Qty: 45 CAPSULE | Refills: 0 | Status: SHIPPED | OUTPATIENT
Start: 2020-11-16 | End: 2021-01-20

## 2020-11-16 RX ORDER — VENLAFAXINE HYDROCHLORIDE 75 MG/1
75 CAPSULE, EXTENDED RELEASE ORAL DAILY
Qty: 90 CAPSULE | Refills: 0 | Status: SHIPPED | OUTPATIENT
Start: 2020-11-16 | End: 2021-01-20

## 2020-11-22 ENCOUNTER — OFFICE VISIT (OUTPATIENT)
Dept: URGENT CARE | Facility: URGENT CARE | Age: 50
End: 2020-11-22
Payer: COMMERCIAL

## 2020-11-22 VITALS
DIASTOLIC BLOOD PRESSURE: 74 MMHG | BODY MASS INDEX: 32.03 KG/M2 | WEIGHT: 196 LBS | HEART RATE: 84 BPM | OXYGEN SATURATION: 99 % | TEMPERATURE: 99.3 F | RESPIRATION RATE: 18 BRPM | SYSTOLIC BLOOD PRESSURE: 118 MMHG

## 2020-11-22 DIAGNOSIS — Z20.822 SUSPECTED COVID-19 VIRUS INFECTION: ICD-10-CM

## 2020-11-22 DIAGNOSIS — R30.0 DYSURIA: Primary | ICD-10-CM

## 2020-11-22 LAB
ALBUMIN UR-MCNC: NEGATIVE MG/DL
APPEARANCE UR: CLEAR
BILIRUB UR QL STRIP: NEGATIVE
COLOR UR AUTO: YELLOW
GLUCOSE UR STRIP-MCNC: NEGATIVE MG/DL
HGB UR QL STRIP: NEGATIVE
KETONES UR STRIP-MCNC: NEGATIVE MG/DL
LEUKOCYTE ESTERASE UR QL STRIP: NEGATIVE
NITRATE UR QL: NEGATIVE
PH UR STRIP: 5.5 PH (ref 5–7)
SOURCE: NORMAL
SP GR UR STRIP: 1.02 (ref 1–1.03)
UROBILINOGEN UR STRIP-ACNC: 0.2 EU/DL (ref 0.2–1)

## 2020-11-22 PROCEDURE — 99214 OFFICE O/P EST MOD 30 MIN: CPT | Performed by: FAMILY MEDICINE

## 2020-11-22 PROCEDURE — 81003 URINALYSIS AUTO W/O SCOPE: CPT | Performed by: PHYSICIAN ASSISTANT

## 2020-11-22 PROCEDURE — 87086 URINE CULTURE/COLONY COUNT: CPT | Performed by: FAMILY MEDICINE

## 2020-11-22 PROCEDURE — U0003 INFECTIOUS AGENT DETECTION BY NUCLEIC ACID (DNA OR RNA); SEVERE ACUTE RESPIRATORY SYNDROME CORONAVIRUS 2 (SARS-COV-2) (CORONAVIRUS DISEASE [COVID-19]), AMPLIFIED PROBE TECHNIQUE, MAKING USE OF HIGH THROUGHPUT TECHNOLOGIES AS DESCRIBED BY CMS-2020-01-R: HCPCS | Performed by: FAMILY MEDICINE

## 2020-11-22 RX ORDER — PHENAZOPYRIDINE HYDROCHLORIDE 200 MG/1
200 TABLET, FILM COATED ORAL 3 TIMES DAILY PRN
Qty: 6 TABLET | Refills: 0 | Status: SHIPPED | OUTPATIENT
Start: 2020-11-22 | End: 2021-02-16
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
## 2020-11-23 LAB
BACTERIA SPEC CULT: NORMAL
Lab: NORMAL
SARS-COV-2 RNA SPEC QL NAA+PROBE: NOT DETECTED
SPECIMEN SOURCE: NORMAL
SPECIMEN SOURCE: NORMAL

## 2020-12-17 ENCOUNTER — RESULTS ONLY (OUTPATIENT)
Dept: LAB | Age: 50
End: 2020-12-17

## 2020-12-17 DIAGNOSIS — Z53.9 ERRONEOUS ENCOUNTER--DISREGARD: Primary | ICD-10-CM

## 2020-12-17 LAB
SARS-COV-2 RNA SPEC QL NAA+PROBE: NORMAL
SPECIMEN SOURCE: NORMAL

## 2020-12-17 NOTE — PROGRESS NOTES
COVID-19 PCR test completed. Patient handout For Patients Who Have Been Tested for Covid-19 (Coronavirus) was given to the patient, which includes test result notification process.    This encounter was opened in error. Please disregard.

## 2020-12-18 LAB
LABORATORY COMMENT REPORT: NORMAL
SARS-COV-2 RNA SPEC QL NAA+PROBE: NEGATIVE
SPECIMEN SOURCE: NORMAL

## 2021-01-18 ENCOUNTER — MYC REFILL (OUTPATIENT)
Dept: FAMILY MEDICINE | Facility: CLINIC | Age: 51
End: 2021-01-18

## 2021-01-18 DIAGNOSIS — F41.9 ACUTE ANXIETY: ICD-10-CM

## 2021-01-18 DIAGNOSIS — F33.1 MAJOR DEPRESSIVE DISORDER, RECURRENT EPISODE, MODERATE (H): ICD-10-CM

## 2021-01-20 RX ORDER — VENLAFAXINE HYDROCHLORIDE 75 MG/1
75 CAPSULE, EXTENDED RELEASE ORAL DAILY
Qty: 90 CAPSULE | Refills: 0 | Status: SHIPPED | OUTPATIENT
Start: 2021-01-20 | End: 2021-02-15

## 2021-01-20 NOTE — TELEPHONE ENCOUNTER
"  PHQ 8/31/2020 10/30/2020 1/20/2021   PHQ-9 Total Score 10 6 5   Q9: Thoughts of better off dead/self-harm past 2 weeks Not at all Not at all Not at all     SELENA-7 SCORE 4/17/2020 10/30/2020 1/20/2021   Total Score - - -   Total Score - 4 (minimal anxiety) 6 (mild anxiety)   Total Score 17 4 6     Requested Prescriptions   Pending Prescriptions Disp Refills     vortioxetine (TRINTELLIX) 20 MG tablet 90 tablet 0     Sig: Take 1 tablet (20 mg) by mouth daily       SSRIs Protocol Failed - 1/20/2021  8:52 AM        Failed - PHQ-9 score less than 5 in past 6 months     Please review last PHQ-9 score.           Passed - Medication is active on med list        Passed - Patient is age 18 or older        Passed - No active pregnancy on record        Passed - No positive pregnancy test in last 12 months        Passed - Recent (6 mo) or future (30 days) visit within the authorizing provider's specialty     Patient had office visit in the last 6 months or has a visit in the next 30 days with authorizing provider or within the authorizing provider's specialty.  See \"Patient Info\" tab in inbasket, or \"Choose Columns\" in Meds & Orders section of the refill encounter.                 "

## 2021-02-15 ENCOUNTER — MYC REFILL (OUTPATIENT)
Dept: FAMILY MEDICINE | Facility: CLINIC | Age: 51
End: 2021-02-15

## 2021-02-15 DIAGNOSIS — F33.1 MAJOR DEPRESSIVE DISORDER, RECURRENT EPISODE, MODERATE (H): ICD-10-CM

## 2021-02-15 DIAGNOSIS — F41.9 ACUTE ANXIETY: ICD-10-CM

## 2021-02-16 RX ORDER — VENLAFAXINE HYDROCHLORIDE 75 MG/1
75 CAPSULE, EXTENDED RELEASE ORAL DAILY
Qty: 90 CAPSULE | Refills: 0 | Status: SHIPPED | OUTPATIENT
Start: 2021-02-16 | End: 2021-02-26

## 2021-02-16 NOTE — TELEPHONE ENCOUNTER
Failed protocol.  please route to  team if patient needs an appointment -please see [employer] message on sig    Genie OGNZALEZRN BSN  [facility]  [phone]

## 2021-02-17 ENCOUNTER — TRANSFERRED RECORDS (OUTPATIENT)
Dept: HEALTH INFORMATION MANAGEMENT | Facility: CLINIC | Age: 51
End: 2021-02-17

## 2021-02-24 ENCOUNTER — TRANSFERRED RECORDS (OUTPATIENT)
Dept: HEALTH INFORMATION MANAGEMENT | Facility: CLINIC | Age: 51
End: 2021-02-24

## 2021-02-25 ENCOUNTER — TRANSFERRED RECORDS (OUTPATIENT)
Dept: HEALTH INFORMATION MANAGEMENT | Facility: CLINIC | Age: 51
End: 2021-02-25

## 2021-02-26 ENCOUNTER — OFFICE VISIT (OUTPATIENT)
Dept: FAMILY MEDICINE | Facility: CLINIC | Age: 51
End: 2021-02-26
Payer: COMMERCIAL

## 2021-02-26 VITALS
HEART RATE: 113 BPM | OXYGEN SATURATION: 99 % | DIASTOLIC BLOOD PRESSURE: 72 MMHG | TEMPERATURE: 97.3 F | BODY MASS INDEX: 32.12 KG/M2 | SYSTOLIC BLOOD PRESSURE: 112 MMHG | HEIGHT: 66 IN

## 2021-02-26 DIAGNOSIS — S92.902A CLOSED FRACTURE OF LEFT FOOT, INITIAL ENCOUNTER: ICD-10-CM

## 2021-02-26 DIAGNOSIS — Z01.818 PREOPERATIVE EXAMINATION: Primary | ICD-10-CM

## 2021-02-26 LAB
ERYTHROCYTE [DISTWIDTH] IN BLOOD BY AUTOMATED COUNT: 11.7 % (ref 10–15)
HCT VFR BLD AUTO: 40.6 % (ref 35–47)
HGB BLD-MCNC: 13.5 G/DL (ref 11.7–15.7)
MCH RBC QN AUTO: 30.1 PG (ref 26.5–33)
MCHC RBC AUTO-ENTMCNC: 33.3 G/DL (ref 31.5–36.5)
MCV RBC AUTO: 91 FL (ref 78–100)
PLATELET # BLD AUTO: 186 10E9/L (ref 150–450)
RBC # BLD AUTO: 4.48 10E12/L (ref 3.8–5.2)
WBC # BLD AUTO: 7.3 10E9/L (ref 4–11)

## 2021-02-26 PROCEDURE — 99214 OFFICE O/P EST MOD 30 MIN: CPT | Performed by: FAMILY MEDICINE

## 2021-02-26 PROCEDURE — 85027 COMPLETE CBC AUTOMATED: CPT | Performed by: FAMILY MEDICINE

## 2021-02-26 PROCEDURE — 36415 COLL VENOUS BLD VENIPUNCTURE: CPT | Performed by: FAMILY MEDICINE

## 2021-02-26 RX ORDER — HYDROCODONE BITARTRATE AND ACETAMINOPHEN 5; 325 MG/1; MG/1
TABLET ORAL
COMMUNITY
Start: 2021-02-17 | End: 2021-07-07

## 2021-02-26 RX ORDER — VENLAFAXINE HYDROCHLORIDE 75 MG/1
75 CAPSULE, EXTENDED RELEASE ORAL DAILY
COMMUNITY
Start: 2021-02-26 | End: 2021-04-11
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
## 2021-03-10 ENCOUNTER — TRANSFERRED RECORDS (OUTPATIENT)
Dept: HEALTH INFORMATION MANAGEMENT | Facility: CLINIC | Age: 51
End: 2021-03-10

## 2021-03-17 ENCOUNTER — TRANSFERRED RECORDS (OUTPATIENT)
Dept: HEALTH INFORMATION MANAGEMENT | Facility: CLINIC | Age: 51
End: 2021-03-17

## 2021-04-15 ENCOUNTER — TRANSFERRED RECORDS (OUTPATIENT)
Dept: HEALTH INFORMATION MANAGEMENT | Facility: CLINIC | Age: 51
End: 2021-04-15

## 2021-04-26 DIAGNOSIS — F33.1 MAJOR DEPRESSIVE DISORDER, RECURRENT EPISODE, MODERATE (H): ICD-10-CM

## 2021-04-26 DIAGNOSIS — E78.5 HYPERLIPIDEMIA LDL GOAL <100: ICD-10-CM

## 2021-04-26 DIAGNOSIS — F41.9 ACUTE ANXIETY: ICD-10-CM

## 2021-04-26 RX ORDER — ATORVASTATIN CALCIUM 20 MG/1
20 TABLET, FILM COATED ORAL DAILY
Qty: 90 TABLET | Refills: 0 | Status: SHIPPED | OUTPATIENT
Start: 2021-04-26 | End: 2021-07-07

## 2021-04-26 NOTE — TELEPHONE ENCOUNTER
Prescription approved per [facility] Refill Protocol.    Karen ZHANG RN  EP Triage

## 2021-04-26 NOTE — TELEPHONE ENCOUNTER
PHQ 10/30/2020 1/20/2021 4/26/2021   PHQ-9 Total Score 6 5 6   Q9: Thoughts of better off dead/self-harm past 2 weeks Not at all Not at all Not at all     SELENA-7 SCORE 10/30/2020 1/20/2021 4/26/2021   Total Score - - -   Total Score 4 (minimal anxiety) 6 (mild anxiety) 6 (mild anxiety)   Total Score 4 6 6       Routing refill request to provider for review/approval because:  PHQ-9 > 4  See patient mychart message:  Nadja Prater. I filled them out and submitted them. The answers may be a bit misleading - we are dealing with a couple of high-stress situations right now, and so my anxiety is probably higher than usual. Even so, I feel better on the Trintellix than I did previously, and I don t feel that I need a higher dose or other change. We re still weaning down the venlafaxine, and that is going pretty well.      Please call me if you have any questions. Thanks,     Milagro

## 2021-04-27 ENCOUNTER — TRANSFERRED RECORDS (OUTPATIENT)
Dept: HEALTH INFORMATION MANAGEMENT | Facility: CLINIC | Age: 51
End: 2021-04-27

## 2021-06-08 DIAGNOSIS — M25.532 LEFT WRIST PAIN: ICD-10-CM

## 2021-06-08 RX ORDER — GABAPENTIN 300 MG/1
CAPSULE ORAL
Qty: 90 CAPSULE | Refills: 1 | Status: SHIPPED | OUTPATIENT
Start: 2021-06-08 | End: 2021-07-07

## 2021-06-08 NOTE — TELEPHONE ENCOUNTER
Routing refill request to provider for review/approval because:  Drug not on the [facility] refill protocol     Karen ZHANG RN  EP Triage

## 2021-07-07 ENCOUNTER — VIRTUAL VISIT (OUTPATIENT)
Dept: FAMILY MEDICINE | Facility: CLINIC | Age: 51
End: 2021-07-07
Payer: COMMERCIAL

## 2021-07-07 DIAGNOSIS — F41.9 ACUTE ANXIETY: ICD-10-CM

## 2021-07-07 DIAGNOSIS — E78.5 HYPERLIPIDEMIA LDL GOAL <100: ICD-10-CM

## 2021-07-07 DIAGNOSIS — Q66.70 PES CAVUS: ICD-10-CM

## 2021-07-07 DIAGNOSIS — Z12.11 SCREENING FOR COLON CANCER: ICD-10-CM

## 2021-07-07 DIAGNOSIS — B35.3 TINEA PEDIS OF BOTH FEET: ICD-10-CM

## 2021-07-07 DIAGNOSIS — F33.1 MAJOR DEPRESSIVE DISORDER, RECURRENT EPISODE, MODERATE (H): Primary | ICD-10-CM

## 2021-07-07 DIAGNOSIS — E04.1 THYROID NODULE: ICD-10-CM

## 2021-07-07 DIAGNOSIS — M25.532 LEFT WRIST PAIN: ICD-10-CM

## 2021-07-07 PROCEDURE — 99214 OFFICE O/P EST MOD 30 MIN: CPT | Mod: 95 | Performed by: INTERNAL MEDICINE

## 2021-07-07 RX ORDER — LORAZEPAM 1 MG/1
1 TABLET ORAL DAILY PRN
Qty: 10 TABLET | Refills: 0 | Status: SHIPPED | OUTPATIENT
Start: 2021-07-07 | End: 2024-08-09

## 2021-07-07 RX ORDER — ATORVASTATIN CALCIUM 20 MG/1
20 TABLET, FILM COATED ORAL DAILY
Qty: 90 TABLET | Refills: 3 | Status: SHIPPED | OUTPATIENT
Start: 2021-07-07 | End: 2022-07-16

## 2021-07-07 RX ORDER — CICLOPIROX OLAMINE 7.7 MG/G
CREAM TOPICAL 2 TIMES DAILY
Qty: 30 G | Refills: 1 | Status: SHIPPED | OUTPATIENT
Start: 2021-07-07 | End: 2022-11-16

## 2021-07-07 RX ORDER — GABAPENTIN 300 MG/1
CAPSULE ORAL
Qty: 90 CAPSULE | Refills: 1 | Status: SHIPPED | OUTPATIENT
Start: 2021-07-07 | End: 2022-07-16

## 2021-07-07 RX ORDER — LIDOCAINE 50 MG/G
OINTMENT TOPICAL PRN
Qty: 50 G | Refills: 1 | Status: SHIPPED | OUTPATIENT
Start: 2021-07-07
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
## 2021-07-12 DIAGNOSIS — F33.1 MAJOR DEPRESSIVE DISORDER, RECURRENT EPISODE, MODERATE (H): ICD-10-CM

## 2021-07-13 NOTE — TELEPHONE ENCOUNTER
At my last visit with Milagro she reported that she was taking Effexor every other day as she has been weaning off. Please check to see if she needs a refill on this or not.

## 2021-07-13 NOTE — TELEPHONE ENCOUNTER
Failed protocol.  please route to  team if patient needs an appointment     Genie GONZALEZRN BSN  [facility]  [phone]

## 2021-07-14 ENCOUNTER — MYC MEDICAL ADVICE (OUTPATIENT)
Dept: FAMILY MEDICINE | Facility: CLINIC | Age: 51
End: 2021-07-14

## 2021-07-15 RX ORDER — VENLAFAXINE HYDROCHLORIDE 37.5 MG/1
CAPSULE, EXTENDED RELEASE ORAL
Qty: 30 CAPSULE | OUTPATIENT
Start: 2021-07-15

## 2021-08-13 ENCOUNTER — HOSPITAL ENCOUNTER (OUTPATIENT)
Dept: ULTRASOUND IMAGING | Facility: CLINIC | Age: 51
Discharge: HOME | End: 2021-08-13
Attending: INTERNAL MEDICINE | Admitting: INTERNAL MEDICINE
Payer: COMMERCIAL

## 2021-08-13 ENCOUNTER — MYC MEDICAL ADVICE (OUTPATIENT)
Dept: FAMILY MEDICINE | Facility: CLINIC | Age: 51
End: 2021-08-13

## 2021-08-13 DIAGNOSIS — E04.1 THYROID NODULE: ICD-10-CM

## 2021-08-13 PROCEDURE — 76536 US EXAM OF HEAD AND NECK: CPT

## 2021-08-17 ENCOUNTER — TELEPHONE (OUTPATIENT)
Dept: FAMILY MEDICINE | Facility: CLINIC | Age: 51
End: 2021-08-17

## 2021-08-17 NOTE — TELEPHONE ENCOUNTER
----- Message from Justen Gan MD sent at 8/17/2021 11:39 AM CDT -----  Please call the patient to notify patient that the thyroid ultrasound shows essentially stable nodule in the right thyroid lobe as compared to the ultrasound done in 2020.    This is reassuring.  It does not meet the criteria for biopsy.  I would recommend a repeat thyroid ultrasound in 2 years again for comparison.  At that time if it is still stable, no further imaging would be indicated.          Justen Gan MD

## 2021-08-17 NOTE — TELEPHONE ENCOUNTER
I routed the result note a thyroid ultrasound to the radiologist to review and comment about the left thyroid nodule.  Await his comments.    Justen Gan MD  [facility]

## 2021-08-17 NOTE — RESULT ENCOUNTER NOTE
Can you please review and comment if nodule previously noted in the left thyroid lobe has resolved?  Patient is concerned about that.    Justen Gan MD

## 2021-08-17 NOTE — RESULT ENCOUNTER NOTE
Please call the patient to notify patient that the thyroid ultrasound shows essentially stable nodule in the right thyroid lobe as compared to the ultrasound done in 2020.    This is reassuring.  It does not meet the criteria for biopsy.  I would recommend a repeat thyroid ultrasound in 2 years again for comparison.  At that time if it is still stable, no further imaging would be indicated.          Justen Gan MD

## 2021-08-30 ENCOUNTER — TRANSFERRED RECORDS (OUTPATIENT)
Dept: HEALTH INFORMATION MANAGEMENT | Facility: CLINIC | Age: 51
End: 2021-08-30

## 2021-08-31 NOTE — PROGRESS NOTES
Discharge Note        Milagro failed to follow up and current status is unknown.  Please see information below for last relevant information on current status.  Patient seen for 4 visits.    SUBJECTIVE  Subjective changes noted by patient:  10 min late. Healing well from R elbow surgery and also returned from vacation. Urinary incontinence improving a lot. Very occasional leakage only with urge. Able to delay voiding for 2-3 hours. Up 1-2 x night. Only slight improvement with dyspareunia but slowly improving.   .  Current pain level is  .     Previous pain level was   .   Changes in function:  Yes (See Goal flowsheet attached for changes in current functional level)  Adverse reaction to treatment or activity: None    OBJECTIVE  Changes noted in objective findings: PF tension ~ 65% better.      ASSESSMENT/PLAN  Diagnosis: dyspareunia/pelvic dysfunction   Updated problem list and treatment plan:     STG/LTGs have been met or progress has been made towards goals:  Yes, please see goal flowsheet for most current information  Assessment of Progress: current status is unknown.    Last current status: Pt is progressing as expected   Self Management Plans:  HEP  I have re-evaluated this patient and find that the nature, scope, duration and intensity of the therapy is appropriate for the medical condition of the patient.  Milagro continues to require the following intervention to meet STG and LTG's:  HEP.    Recommendations:  Discharge with current home program.  Patient to follow up with MD as needed.    Please refer to the daily flowsheet for treatment today, total treatment time and time spent performing 1:1 timed codes.         Last Appointment:  8/30/2021  Future Appointments   Date Time Provider Hernandez Mcintyre   12/6/2021  3:00 PM MD Ysabel Camejo [facility]

## 2021-09-18 ENCOUNTER — HEALTH MAINTENANCE LETTER (OUTPATIENT)
Age: 51
End: 2021-09-18

## 2021-10-22 ENCOUNTER — LAB REQUISITION (OUTPATIENT)
Dept: LAB | Facility: CLINIC | Age: 51
End: 2021-10-22

## 2021-10-22 ENCOUNTER — APPOINTMENT (OUTPATIENT)
Dept: URGENT CARE | Facility: URGENT CARE | Age: 51
End: 2021-10-22
Payer: COMMERCIAL

## 2021-10-22 PROCEDURE — U0003 INFECTIOUS AGENT DETECTION BY NUCLEIC ACID (DNA OR RNA); SEVERE ACUTE RESPIRATORY SYNDROME CORONAVIRUS 2 (SARS-COV-2) (CORONAVIRUS DISEASE [COVID-19]), AMPLIFIED PROBE TECHNIQUE, MAKING USE OF HIGH THROUGHPUT TECHNOLOGIES AS DESCRIBED BY CMS-2020-01-R: HCPCS | Performed by: INTERNAL MEDICINE

## 2021-10-23 LAB — SARS-COV-2 RNA RESP QL NAA+PROBE: NEGATIVE

## 2021-10-27 ENCOUNTER — APPOINTMENT (OUTPATIENT)
Dept: URGENT CARE | Facility: URGENT CARE | Age: 51
End: 2021-10-27
Payer: COMMERCIAL

## 2021-10-27 ENCOUNTER — LAB REQUISITION (OUTPATIENT)
Dept: LAB | Facility: CLINIC | Age: 51
End: 2021-10-27

## 2021-10-27 LAB — SARS-COV-2 RNA RESP QL NAA+PROBE: NEGATIVE

## 2021-10-27 PROCEDURE — U0003 INFECTIOUS AGENT DETECTION BY NUCLEIC ACID (DNA OR RNA); SEVERE ACUTE RESPIRATORY SYNDROME CORONAVIRUS 2 (SARS-COV-2) (CORONAVIRUS DISEASE [COVID-19]), AMPLIFIED PROBE TECHNIQUE, MAKING USE OF HIGH THROUGHPUT TECHNOLOGIES AS DESCRIBED BY CMS-2020-01-R: HCPCS | Performed by: INTERNAL MEDICINE

## 2021-11-13 ENCOUNTER — HEALTH MAINTENANCE LETTER (OUTPATIENT)
Age: 51
End: 2021-11-13

## 2021-11-22 ENCOUNTER — TRANSFERRED RECORDS (OUTPATIENT)
Dept: HEALTH INFORMATION MANAGEMENT | Facility: CLINIC | Age: 51
End: 2021-11-22
Payer: COMMERCIAL

## 2021-12-08 ENCOUNTER — NURSE TRIAGE (OUTPATIENT)
Dept: FAMILY MEDICINE | Facility: CLINIC | Age: 51
End: 2021-12-08
Payer: COMMERCIAL

## 2021-12-08 NOTE — TELEPHONE ENCOUNTER
Patient could be scheduled in my virtual spots tomorrow - but needs 40 min APPT.      - [place], CNP

## 2021-12-08 NOTE — TELEPHONE ENCOUNTER
SALIMA to PCP.   Patient is scheduled for 12/28/21. Is there a way she can be worked in sooner?     SITUATION:   The patient states that she has a feeling like her heart is jumping like fish in her chest.   Patient states her HR is 74. The episodes often last for 30-45 minutes. Patient describes feeling arhythmia. Patient states that symptoms are intermittent and occur when laying down. Patient does not have chest pain and feels tight. Denies dizziness or sweating or difficult breathing. Last night when this occurred the patient felt a little  to cough.   The patient is not having symptoms during conversation.     HOME TREATMENTS:  Deep breath to see if it is related to anxiety.     HEALTH HISTORY:  PVCs     PATIENT REQUEST:   To schedule appointment.     PLAN:   Patient was advised if symptoms or red flag symptoms develop she will go to ED.   Patient was transferred to scheduling.   Next 5 appointments (look out 90 days)    Dec 28, 2021 11:30 AM  (Arrive by 11:10 AM)  Provider Visit with STARLA Galicia CNP  [facility] ([facility] ) [address] [place] [id number]  [phone]          DANIELA RamseyN, RN, PHN  [place]  December 8, 2021    Reason for Disposition    Palpitations and no improvement after following Care Advice    Additional Information    Negative: Passed out (i.e., fainted, collapsed and was not responding)    Negative: Shock suspected (e.g., cold/pale/clammy skin, too weak to stand, low BP, rapid pulse)    Negative: Difficult to awaken or acting confused (e.g., disoriented, slurred speech)    Negative: Visible sweat on face or sweat dripping down face    Negative: Unable to walk, or can only walk with assistance (e.g., requires support)    Negative: Received SHOCK from implantable cardiac defibrillator and has persisting symptoms (i.e., palpitations, lightheadedness)    Negative: Dizziness,  lightheadedness, or weakness and heart beating very rapidly (e.g., > 140 / minute)    Negative: Dizziness, lightheadedness, or weakness and heart beating very slowly (e.g., < 50 / minute)    Negative: Sounds like a life-threatening emergency to the triager    Negative: Chest pain    Negative: Difficulty breathing    Negative: Dizziness, lightheadedness, or weakness    Negative: Heart beating very rapidly (e.g., > 140 / minute) and present now (Exception: during exercise)    Negative: Heart beating very slowly (e.g., < 50 / minute) (Exception: athlete)    Negative: New or worsened shortness of breath with activity (dyspnea on exertion)    Negative: Patient sounds very sick or weak to the triager    Negative: Wearing a 'Holter monitor' or 'cardiac event monitor'    Negative: Received SHOCK from implantable cardiac defibrillator (and now feels well)    Negative: Heart beating very rapidly (e.g., > 140 / minute) and not present now (Exception: during exercise)    Negative: Skipped or extra beat(s) and increases with exercise or exertion    Negative: Skipped or extra beat(s) and occurs 4 or more times per minute    Negative: History of heart disease (i.e., heart attack, bypass surgery, angina, angioplasty)    Negative: Age > 60 years    Negative: Taking water pill (i.e., diuretic) or heart medication (e.g., digoxin)    Negative: Patient wants to be seen    Protocols used: HEART RATE AND HEARTBEAT [id number]

## 2021-12-08 NOTE — TELEPHONE ENCOUNTER
Called #   Telephone Information:   Mobile [phone]     Advised pt on the information below     Made an appointment  - done at 3:40 as it will work only for pts schedule        Michelle Saez RN, BSN  [facility]

## 2021-12-09 ENCOUNTER — OFFICE VISIT (OUTPATIENT)
Dept: FAMILY MEDICINE | Facility: CLINIC | Age: 51
End: 2021-12-09
Payer: COMMERCIAL

## 2021-12-09 VITALS
DIASTOLIC BLOOD PRESSURE: 71 MMHG | OXYGEN SATURATION: 100 % | SYSTOLIC BLOOD PRESSURE: 129 MMHG | HEIGHT: 66 IN | BODY MASS INDEX: 31.27 KG/M2 | HEART RATE: 77 BPM | TEMPERATURE: 98.8 F | WEIGHT: 194.6 LBS

## 2021-12-09 DIAGNOSIS — R00.2 PALPITATIONS: Primary | ICD-10-CM

## 2021-12-09 LAB
ERYTHROCYTE [DISTWIDTH] IN BLOOD BY AUTOMATED COUNT: 11.3 % (ref 10–15)
HCT VFR BLD AUTO: 38.3 % (ref 35–47)
HGB BLD-MCNC: 12.7 G/DL (ref 11.7–15.7)
MCH RBC QN AUTO: 29.8 PG (ref 26.5–33)
MCHC RBC AUTO-ENTMCNC: 33.2 G/DL (ref 31.5–36.5)
MCV RBC AUTO: 90 FL (ref 78–100)
PLATELET # BLD AUTO: 168 10E3/UL (ref 150–450)
RBC # BLD AUTO: 4.26 10E6/UL (ref 3.8–5.2)
WBC # BLD AUTO: 6.4 10E3/UL (ref 4–11)

## 2021-12-09 PROCEDURE — 85027 COMPLETE CBC AUTOMATED: CPT | Performed by: NURSE PRACTITIONER

## 2021-12-09 PROCEDURE — 36415 COLL VENOUS BLD VENIPUNCTURE: CPT | Performed by: NURSE PRACTITIONER

## 2021-12-09 PROCEDURE — 99214 OFFICE O/P EST MOD 30 MIN: CPT | Performed by: NURSE PRACTITIONER

## 2021-12-09 PROCEDURE — 93000 ELECTROCARDIOGRAM COMPLETE: CPT | Performed by: NURSE PRACTITIONER

## 2021-12-09 PROCEDURE — 80053 COMPREHEN METABOLIC PANEL: CPT | Performed by: NURSE PRACTITIONER

## 2021-12-09 PROCEDURE — 84443 ASSAY THYROID STIM HORMONE: CPT | Performed by: NURSE PRACTITIONER

## 2021-12-09 RX ORDER — OMEGA-3 FATTY ACIDS/FISH OIL 300-1000MG
CAPSULE ORAL
COMMUNITY
End: 2022-08-21

## 2021-12-09 RX ORDER — IBUPROFEN 200 MG
1 CAPSULE ORAL 2 TIMES DAILY
COMMUNITY
Start: 2021-12-09

## 2021-12-09 RX ORDER — MULTIVITAMIN
1 TABLET ORAL DAILY
COMMUNITY
Start: 2021-12-09

## 2021-12-09 ASSESSMENT — MIFFLIN-ST. JEOR: SCORE: 1514.45

## 2021-12-09 NOTE — PROGRESS NOTES
"  Assessment & Plan       Sadie was seen today for palpitations.    Diagnoses and all orders for this visit:    Palpitations  Increasing palpitations over the past several weeks.    EKG done in clinic and plan for further evaluation with labs and Zio patch monitoring.    -     EKG 12-lead complete w/read - Clinics - SR, no acute abnormalities or arrhythmias  -     Leadless EKG Monitor 3 to 7 Days; Future  -     TSH with free T4 reflex  -     Comprehensive metabolic panel (BMP + Alb, Alk Phos, ALT, AST, Total. Bili, TP)  -     CBC with platelets      38 minutes spent on the date of the encounter doing chart review, history and exam, documentation and further activities per the note      Return in about 4 weeks (around 1/6/2022) for No improvement or sooner with worsening symptoms.    Eleanor Acevedo, ST[facility]   SADIE is a 51 year old who presents for the following health issues  accompanied by her self.    History of Present Illness       She eats 4 or more servings of fruits and vegetables daily.She consumes 1 sweetened beverage(s) daily.She exercises with enough effort to increase her heart rate 20 to 29 minutes per day.  She exercises with enough effort to increase her heart rate 4 days per week.   She is taking medications regularly.         Chest Pain  Onset/Duration: Palpations x 1 month, mostly when laying down  Heart feels like it is jumping and a \"flip flopping\" sensation.   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      Increase in episodes.  Lasts up to 1 hour or more.       Mother with Atrial Fibrillation, diagnosed at age 75.     Description:   Location: entire chest  Character: flip/flop feeling  Radiation: tightness on left side into the shoulder  Duration: can last hours at a time and are intermittent   Intensity: not painful  Progression of Symptoms: constant and intermittent  Accompanying Signs & Symptoms:  Shortness of breath: " "no  Sweating: no  Nausea/vomiting: no  Lightheadedness: no  Palpitations: YES  Fever/Chills: no  Cough: sometimes when thinking about it           Heartburn: no  History:   Family history of heart disease: YES- pt's grandmother & Grandfather  Tobacco use: no  Previous similar symptoms: YES- 2015, different type of feeling  Precipitating factors:   Worse with exertion: no  Worse with deep breaths: no           Related to eating: no           Better with burping: no  Alleviating factors: none  Therapies tried and outcome: none    Nurse in NICU.     1 caffeinated beverage at beginning of day.    Stress level overall has been stable, no increase.          Review of Systems   Constitutional, HEENT, cardiovascular, pulmonary, gi and gu systems are negative, except as otherwise noted.      Objective    /71 (BP Location: Right arm, Patient Position: Sitting)   Pulse 77   Temp 98.8  F (37.1  C) (Tympanic)   Ht 1.676 m (5' 6\")   Wt 88.3 kg (194 lb 9.6 oz)   SpO2 100%   BMI 31.41 kg/m    Body mass index is 31.41 kg/m .  Physical Exam     GENERAL: healthy, alert and no distress  RESP: lungs clear to auscultation - no rales, rhonchi or wheezes  CV: regular rate and rhythm, normal S1 S2, no S3 or S4, no murmur, click or rub, no peripheral edema  SKIN: no suspicious lesions or rashes  NEURO: Normal strength and tone, mentation intact and speech normal  PSYCH: mentation appears normal, affect normal/bright              "

## 2021-12-10 NOTE — RESULT ENCOUNTER NOTE
Dear SADIE,     -Normal red blood cell (hgb) levels, normal white blood cell count and normal platelet levels.      Please send a [employer] message or call [phone]  if you have any questions.      STARLA Manning, [facility]    If you have further questions about the interpretation of your labs, [email or web address] is a good website to check out for further information.

## 2021-12-12 LAB
ALBUMIN SERPL-MCNC: 3.4 G/DL (ref 3.4–5)
ALP SERPL-CCNC: 108 U/L (ref 40–150)
ALT SERPL W P-5'-P-CCNC: 23 U/L (ref 0–50)
ANION GAP SERPL CALCULATED.3IONS-SCNC: 7 MMOL/L (ref 3–14)
AST SERPL W P-5'-P-CCNC: 22 U/L (ref 0–45)
BILIRUB SERPL-MCNC: 0.1 MG/DL (ref 0.2–1.3)
BUN SERPL-MCNC: 18 MG/DL (ref 7–30)
CALCIUM SERPL-MCNC: 9.1 MG/DL (ref 8.5–10.1)
CHLORIDE BLD-SCNC: 107 MMOL/L (ref 94–109)
CO2 SERPL-SCNC: 26 MMOL/L (ref 20–32)
CREAT SERPL-MCNC: 0.77 MG/DL (ref 0.52–1.04)
GFR SERPL CREATININE-BSD FRML MDRD: 90 ML/MIN/1.73M2
GLUCOSE BLD-MCNC: 80 MG/DL (ref 70–99)
POTASSIUM BLD-SCNC: 3.9 MMOL/L (ref 3.4–5.3)
PROT SERPL-MCNC: 7 G/DL (ref 6.8–8.8)
SODIUM SERPL-SCNC: 140 MMOL/L (ref 133–144)
TSH SERPL DL<=0.005 MIU/L-ACNC: 1.76 MU/L (ref 0.4–4)

## 2021-12-13 NOTE — RESULT ENCOUNTER NOTE
SADIE  I have reviewed your recent labs. Here are the results:    -Normal red blood cell (hgb) levels, normal white blood cell count and normal platelet levels.  -Liver and gallbladder tests are normal (ALT,AST, Alk phos, bilirubin), kidney function is normal (Cr, GFR), sodium is normal, potassium is normal, calcium is normal, glucose is normal.  -TSH (thyroid stimulating hormone) level is normal which indicates normal thyroid function.      If you have any questions please do not hesitate to contact our office via phone ([phone]) or MyChart.    Patsy Ring, MS, PA-C (covering for Eleanor Acevedo, CNP)  [facility]

## 2021-12-14 ENCOUNTER — HOSPITAL ENCOUNTER (OUTPATIENT)
Dept: CARDIOLOGY | Facility: CLINIC | Age: 51
Discharge: HOME | End: 2021-12-14
Attending: NURSE PRACTITIONER | Admitting: NURSE PRACTITIONER
Payer: COMMERCIAL

## 2021-12-14 DIAGNOSIS — R00.2 PALPITATIONS: ICD-10-CM

## 2021-12-14 PROCEDURE — 93242 EXT ECG>48HR<7D RECORDING: CPT

## 2021-12-14 PROCEDURE — 93244 EXT ECG>48HR<7D REV&INTERPJ: CPT | Performed by: INTERNAL MEDICINE

## 2021-12-29 NOTE — RESULT ENCOUNTER NOTE
Dear Milagro,     I just received your heart monitor interpretation and there were noted rare PVC's (less than 1.2% of the time) and your activation correlated with PVC's (premature ventricular contractions).    This is overall reassuring that there were no other worrisome arrhythmias.      Please send a [employer] message or call [phone]  if you have any questions.      Eleanor Acevedo, APRN, CNP  [facility]    If you have further questions about the interpretation of your labs, [email or web address] is a good website to check out for further information.

## 2022-01-08 ENCOUNTER — HEALTH MAINTENANCE LETTER (OUTPATIENT)
Age: 52
End: 2022-01-08

## 2022-01-19 ENCOUNTER — TRANSFERRED RECORDS (OUTPATIENT)
Dept: HEALTH INFORMATION MANAGEMENT | Facility: CLINIC | Age: 52
End: 2022-01-19
Payer: COMMERCIAL

## 2022-01-19 LAB
ALT SERPL-CCNC: 15 IU/L (ref 5–35)
AST SERPL-CCNC: 23 U/L (ref 5–34)
CREATININE (EXTERNAL): 0.68 MG/DL (ref 0.5–1.3)
GFR ESTIMATED (EXTERNAL): 97 ML/MIN/1.73M2
HEP C HIM: NORMAL

## 2022-02-08 NOTE — TELEPHONE ENCOUNTER
Patient given result message from Dr. Gan.  Patient is asking if Dr. Gan has been in touch regarding the lump in the left thyroid nodule.    Can we leave a detailed message on this number? YES  Phone number patient can be reached at: Cell number on file:    Telephone Information:   Mobile [phone]     Niyah Cam RN  [facility] Triage       Heparin NS

## 2022-03-03 ENCOUNTER — TRANSFERRED RECORDS (OUTPATIENT)
Dept: HEALTH INFORMATION MANAGEMENT | Facility: CLINIC | Age: 52
End: 2022-03-03
Payer: COMMERCIAL

## 2022-03-15 NOTE — NURSING NOTE
Problem: Pressure Injury, Risk for  Goal: # Skin remains intact  Outcome: Outcome Met, Continue evaluating goal progress toward completion  Pt is able to reposition self       "Chief Complaint   Patient presents with     Work Comp       Initial /62 (BP Location: Right arm, Patient Position: Sitting, Cuff Size: Adult Large)  Pulse 96  Temp 98  F (36.7  C) (Oral)  Ht 5' 6\" (1.676 m)  Wt 196 lb 6.4 oz (89.1 kg)  SpO2 99%  BMI 31.7 kg/m2 Estimated body mass index is 31.7 kg/(m^2) as calculated from the following:    Height as of this encounter: 5' 6\" (1.676 m).    Weight as of this encounter: 196 lb 6.4 oz (89.1 kg).  Medication Reconciliation: complete  "

## 2022-04-11 ENCOUNTER — HOSPITAL ENCOUNTER (OUTPATIENT)
Dept: MAMMOGRAPHY | Facility: CLINIC | Age: 52
Discharge: HOME | End: 2022-04-11
Attending: FAMILY MEDICINE | Admitting: FAMILY MEDICINE
Payer: COMMERCIAL

## 2022-04-11 DIAGNOSIS — Z12.31 VISIT FOR SCREENING MAMMOGRAM: ICD-10-CM

## 2022-04-11 PROCEDURE — 77067 SCR MAMMO BI INCL CAD: CPT

## 2022-04-13 NOTE — RESULT ENCOUNTER NOTE
Your mammogram was normal.     Thank you so much for choosing [facility].  Please contact us with any questions that you may have.   We appreciate the opportunity to serve you now and look forward to supporting your healthcare needs for a long time to come!    Most Sincerely,     Maribel Short MD

## 2022-05-23 ENCOUNTER — TRANSFERRED RECORDS (OUTPATIENT)
Dept: HEALTH INFORMATION MANAGEMENT | Facility: CLINIC | Age: 52
End: 2022-05-23
Payer: COMMERCIAL

## 2022-05-27 ENCOUNTER — LAB REQUISITION (OUTPATIENT)
Dept: LAB | Facility: CLINIC | Age: 52
End: 2022-05-27

## 2022-05-27 PROCEDURE — 86481 TB AG RESPONSE T-CELL SUSP: CPT | Performed by: INTERNAL MEDICINE

## 2022-05-27 PROCEDURE — 36415 COLL VENOUS BLD VENIPUNCTURE: CPT | Performed by: INTERNAL MEDICINE

## 2022-05-29 LAB
GAMMA INTERFERON BACKGROUND BLD IA-ACNC: 0.04 IU/ML
M TB IFN-G BLD-IMP: NEGATIVE
M TB IFN-G CD4+ BCKGRND COR BLD-ACNC: 9.96 IU/ML
MITOGEN IGNF BCKGRD COR BLD-ACNC: 0 IU/ML
MITOGEN IGNF BCKGRD COR BLD-ACNC: 0.04 IU/ML
QUANTIFERON MITOGEN: 10 IU/ML
QUANTIFERON NIL TUBE: 0.04 IU/ML
QUANTIFERON TB1 TUBE: 0.04 IU/ML
QUANTIFERON TB2 TUBE: 0.08

## 2022-07-13 DIAGNOSIS — M25.532 LEFT WRIST PAIN: ICD-10-CM

## 2022-07-13 DIAGNOSIS — F41.9 ACUTE ANXIETY: ICD-10-CM

## 2022-07-13 DIAGNOSIS — F33.1 MAJOR DEPRESSIVE DISORDER, RECURRENT EPISODE, MODERATE (H): ICD-10-CM

## 2022-07-13 DIAGNOSIS — E78.5 HYPERLIPIDEMIA LDL GOAL <100: ICD-10-CM

## 2022-07-15 NOTE — TELEPHONE ENCOUNTER
Future Appointments   Date Time Provider Department Center   11/16/2022 10:40 AM Maribel Short MD [facility]      Routing refill request to provider for review/approval because:  Meds don't pass protocol    Lian Gutierrez RN  [facility]

## 2022-07-15 NOTE — TELEPHONE ENCOUNTER
LOV: 12/9/2021   Patient due for physical  No future appt scheduled    Routing to [facility] to assist in scheduling    Lian Gutierrez RN, BSN  [facility]

## 2022-07-16 RX ORDER — GABAPENTIN 300 MG/1
CAPSULE ORAL
Qty: 90 CAPSULE | Refills: 0 | Status: SHIPPED | OUTPATIENT
Start: 2022-07-16 | End: 2022-10-17

## 2022-07-16 RX ORDER — ATORVASTATIN CALCIUM 20 MG/1
20 TABLET, FILM COATED ORAL DAILY
Qty: 90 TABLET | Refills: 0 | Status: SHIPPED | OUTPATIENT
Start: 2022-07-16 | End: 2022-10-13

## 2022-07-17 ENCOUNTER — OFFICE VISIT (OUTPATIENT)
Dept: URGENT CARE | Facility: URGENT CARE | Age: 52
End: 2022-07-17
Payer: COMMERCIAL

## 2022-07-17 VITALS
SYSTOLIC BLOOD PRESSURE: 130 MMHG | TEMPERATURE: 98.1 F | OXYGEN SATURATION: 99 % | DIASTOLIC BLOOD PRESSURE: 70 MMHG | HEART RATE: 76 BPM | RESPIRATION RATE: 16 BRPM | BODY MASS INDEX: 30.3 KG/M2 | WEIGHT: 187.7 LBS

## 2022-07-17 DIAGNOSIS — M47.819 SPONDYLOARTHROPATHY: ICD-10-CM

## 2022-07-17 DIAGNOSIS — B02.9 HERPES ZOSTER WITHOUT COMPLICATION: Primary | ICD-10-CM

## 2022-07-17 PROCEDURE — 99214 OFFICE O/P EST MOD 30 MIN: CPT | Performed by: PHYSICIAN ASSISTANT

## 2022-07-17 RX ORDER — VALACYCLOVIR HYDROCHLORIDE 1 G/1
1000 TABLET, FILM COATED ORAL 3 TIMES DAILY
Qty: 21 TABLET | Refills: 0 | Status: SHIPPED | OUTPATIENT
Start: 2022-07-17 | End: 2022-11-16
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
## 2022-07-17 NOTE — TELEPHONE ENCOUNTER
Looks like pt transferring her care back to me after about 10 yrs with other providers. Reviewed chart. Ok for refills x #90 days.     Please see offer her earlier appt in September ?       Needs  a 40 min slot - can use a same day overlap or 2 -20 minute virtuals if needed.     BP Readings from Last 3 Encounters:   12/09/21 129/71   02/26/21 112/72   11/22/20 118/74     Also: there is a warning re: interaction   Drug-Drug: ibuprofen and vortioxetine  Toxic effects may be increased with concurrent administration of ibuprofen and Selective Serotonin Reuptake Inhibitors. The risk of upper gastrointestinal bleeding may be increased. Patients taking both drugs concurrently should be educated about the signs and symptoms of GI bleeding.Please inform patient.

## 2022-08-17 ENCOUNTER — LAB (OUTPATIENT)
Dept: URGENT CARE | Facility: URGENT CARE | Age: 52
End: 2022-08-17
Attending: FAMILY MEDICINE
Payer: COMMERCIAL

## 2022-08-17 DIAGNOSIS — Z20.822 SUSPECTED 2019 NOVEL CORONAVIRUS INFECTION: ICD-10-CM

## 2022-08-17 LAB — SARS-COV-2 RNA RESP QL NAA+PROBE: NEGATIVE

## 2022-08-17 PROCEDURE — U0003 INFECTIOUS AGENT DETECTION BY NUCLEIC ACID (DNA OR RNA); SEVERE ACUTE RESPIRATORY SYNDROME CORONAVIRUS 2 (SARS-COV-2) (CORONAVIRUS DISEASE [COVID-19]), AMPLIFIED PROBE TECHNIQUE, MAKING USE OF HIGH THROUGHPUT TECHNOLOGIES AS DESCRIBED BY CMS-2020-01-R: HCPCS

## 2022-08-17 PROCEDURE — U0005 INFEC AGEN DETEC AMPLI PROBE: HCPCS

## 2022-08-18 ENCOUNTER — LAB (OUTPATIENT)
Dept: URGENT CARE | Facility: URGENT CARE | Age: 52
End: 2022-08-18
Attending: FAMILY MEDICINE
Payer: COMMERCIAL

## 2022-08-18 DIAGNOSIS — Z20.822 SUSPECTED 2019 NOVEL CORONAVIRUS INFECTION: ICD-10-CM

## 2022-08-18 LAB — SARS-COV-2 RNA RESP QL NAA+PROBE: NEGATIVE

## 2022-08-18 PROCEDURE — U0005 INFEC AGEN DETEC AMPLI PROBE: HCPCS

## 2022-08-18 PROCEDURE — U0003 INFECTIOUS AGENT DETECTION BY NUCLEIC ACID (DNA OR RNA); SEVERE ACUTE RESPIRATORY SYNDROME CORONAVIRUS 2 (SARS-COV-2) (CORONAVIRUS DISEASE [COVID-19]), AMPLIFIED PROBE TECHNIQUE, MAKING USE OF HIGH THROUGHPUT TECHNOLOGIES AS DESCRIBED BY CMS-2020-01-R: HCPCS

## 2022-08-21 ENCOUNTER — HOSPITAL ENCOUNTER (EMERGENCY)
Facility: CLINIC | Age: 52
Discharge: HOME | End: 2022-08-21
Attending: EMERGENCY MEDICINE | Admitting: EMERGENCY MEDICINE
Payer: OTHER MISCELLANEOUS

## 2022-08-21 ENCOUNTER — APPOINTMENT (OUTPATIENT)
Dept: GENERAL RADIOLOGY | Facility: CLINIC | Age: 52
End: 2022-08-21
Attending: EMERGENCY MEDICINE
Payer: OTHER MISCELLANEOUS

## 2022-08-21 VITALS
RESPIRATION RATE: 16 BRPM | DIASTOLIC BLOOD PRESSURE: 81 MMHG | TEMPERATURE: 98 F | BODY MASS INDEX: 31.05 KG/M2 | SYSTOLIC BLOOD PRESSURE: 131 MMHG | OXYGEN SATURATION: 99 % | WEIGHT: 192.4 LBS | HEART RATE: 72 BPM

## 2022-08-21 DIAGNOSIS — S80.02XA CONTUSION OF LEFT KNEE, INITIAL ENCOUNTER: ICD-10-CM

## 2022-08-21 PROCEDURE — 99283 EMERGENCY DEPT VISIT LOW MDM: CPT | Performed by: EMERGENCY MEDICINE

## 2022-08-21 PROCEDURE — 73562 X-RAY EXAM OF KNEE 3: CPT | Mod: LT

## 2022-08-21 PROCEDURE — 250N000013 HC RX MED GY IP 250 OP 250 PS 637: Performed by: EMERGENCY MEDICINE

## 2022-08-21 RX ORDER — IBUPROFEN 200 MG
400 TABLET ORAL 3 TIMES DAILY
Qty: 18 TABLET | Refills: 0 | Status: SHIPPED | OUTPATIENT
Start: 2022-08-21 | End: 2022-08-24

## 2022-08-21 RX ORDER — ACETAMINOPHEN 325 MG/1
975 TABLET ORAL ONCE
Status: COMPLETED | OUTPATIENT
Start: 2022-08-21 | End: 2022-08-21

## 2022-08-21 RX ADMIN — ACETAMINOPHEN 975 MG: 325 TABLET ORAL at 18:44

## 2022-08-21 ASSESSMENT — ACTIVITIES OF DAILY LIVING (ADL): ADLS_ACUITY_SCORE: 37

## 2022-08-21 NOTE — LETTER
August 21, 2022      To Whom It May Concern:      Milagro Frye was seen in our Emergency Department today, 08/21/22.  I expect her condition to improve over the next few days.  She may return to work/school on 8/24/22.    Sincerely,        Geovanny Mraquez MD, MD

## 2022-08-21 NOTE — DISCHARGE INSTRUCTIONS
Ice and elevate tonight at home    Obtain a neoprene knee brace from [facility] or [employer] to help with knee stabilization and ambulation over the next 4 to 5 days.    A referral has been placed in the computer system for you to be seen by the orthopedic clinic in 1 week for recheck.  They should call you in the next 48 hours to help set up an appointment to be seen.  If they do not call you please call them at Orthopedics Clinic (phone: [phone]).
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
## 2022-08-23 ENCOUNTER — TRANSFERRED RECORDS (OUTPATIENT)
Dept: HEALTH INFORMATION MANAGEMENT | Facility: CLINIC | Age: 52
End: 2022-08-23

## 2022-08-29 ENCOUNTER — TRANSFERRED RECORDS (OUTPATIENT)
Dept: HEALTH INFORMATION MANAGEMENT | Facility: CLINIC | Age: 52
End: 2022-08-29

## 2022-08-29 LAB
AST SERPL-CCNC: 25 U/L (ref 5–34)
CREATININE (EXTERNAL): 0.71 MG/DL (ref 0.5–1.3)
GFR ESTIMATED (EXTERNAL): 91.9 ML/MIN/1.73M2

## 2022-09-01 ENCOUNTER — TRANSFERRED RECORDS (OUTPATIENT)
Dept: HEALTH INFORMATION MANAGEMENT | Facility: CLINIC | Age: 52
End: 2022-09-01

## 2022-09-08 ENCOUNTER — TRANSFERRED RECORDS (OUTPATIENT)
Dept: HEALTH INFORMATION MANAGEMENT | Facility: CLINIC | Age: 52
End: 2022-09-08

## 2022-09-09 ENCOUNTER — TRANSFERRED RECORDS (OUTPATIENT)
Dept: HEALTH INFORMATION MANAGEMENT | Facility: CLINIC | Age: 52
End: 2022-09-09

## 2022-09-21 ENCOUNTER — IMMUNIZATION (OUTPATIENT)
Dept: NURSING | Facility: CLINIC | Age: 52
End: 2022-09-21
Payer: COMMERCIAL

## 2022-09-21 PROCEDURE — 0124A COVID-19,PF,PFIZER BOOSTER BIVALENT: CPT

## 2022-09-21 PROCEDURE — 91312 COVID-19,PF,PFIZER BOOSTER BIVALENT: CPT

## 2022-09-21 PROCEDURE — 90682 RIV4 VACC RECOMBINANT DNA IM: CPT

## 2022-09-21 PROCEDURE — 90471 IMMUNIZATION ADMIN: CPT

## 2022-09-22 ENCOUNTER — TRANSFERRED RECORDS (OUTPATIENT)
Dept: HEALTH INFORMATION MANAGEMENT | Facility: CLINIC | Age: 52
End: 2022-09-22

## 2022-10-11 DIAGNOSIS — F41.9 ACUTE ANXIETY: ICD-10-CM

## 2022-10-11 DIAGNOSIS — E78.5 HYPERLIPIDEMIA LDL GOAL <100: ICD-10-CM

## 2022-10-11 DIAGNOSIS — F33.1 MAJOR DEPRESSIVE DISORDER, RECURRENT EPISODE, MODERATE (H): ICD-10-CM

## 2022-10-11 NOTE — TELEPHONE ENCOUNTER
Routing refill request to provider for review/approval because:  Labs out of range & Labs not current:    LDL Cholesterol Calculated   Date Value Ref Range Status   08/31/2020 106 (H) <100 mg/dL Final     Comment:     Above desirable:  100-129 mg/dl  Borderline High:  130-159 mg/dL  High:             160-189 mg/dL  Very high:       >189 mg/dl       PHQ-9 score:    PHQ 4/26/2021   PHQ-9 Total Score 6   Q9: Thoughts of better off dead/self-harm past 2 weeks Not at all     Has appt scheduled 11/16/22.    Alexa JETT RN

## 2022-10-13 RX ORDER — ATORVASTATIN CALCIUM 20 MG/1
TABLET, FILM COATED ORAL
Qty: 90 TABLET | Refills: 0 | Status: SHIPPED | OUTPATIENT
Start: 2022-10-13 | End: 2022-11-16

## 2022-10-13 RX ORDER — VORTIOXETINE 20 MG/1
TABLET, FILM COATED ORAL
Qty: 90 TABLET | Refills: 0 | Status: SHIPPED | OUTPATIENT
Start: 2022-10-13 | End: 2022-11-16

## 2022-10-16 DIAGNOSIS — M25.532 LEFT WRIST PAIN: ICD-10-CM

## 2022-10-17 ENCOUNTER — TELEPHONE (OUTPATIENT)
Dept: FAMILY MEDICINE | Facility: CLINIC | Age: 52
End: 2022-10-17

## 2022-10-17 RX ORDER — GABAPENTIN 300 MG/1
CAPSULE ORAL
Qty: 90 CAPSULE | Refills: 0 | Status: SHIPPED | OUTPATIENT
Start: 2022-10-17 | End: 2022-11-16

## 2022-10-17 NOTE — TELEPHONE ENCOUNTER
Pt requesting PA for:     TRINTELLIX 20 MG tablet 90 tablet 0 10/13/2022  No   Sig: TAKE 1 TABLET BY MOUTH EVERY DAY   Sent to pharmacy as: Trintellix 20 MG Oral Tablet (vortioxetine)   Class: E-Prescribe   Order: [id number]   E-Prescribing Status: Receipt confirmed by pharmacy (10/13/2022  2:14 PM CDT)   Prior authorization: Closed - Electronic Prior Authorization not supported. Submit via other methods.     Santo MILLER RN

## 2022-10-17 NOTE — TELEPHONE ENCOUNTER
PDMP Review       Value Time User    State PDMP site checked  Yes 10/17/2022  1:35 PM Maribel Short MD        No suspicious activity noted. Pt compliant with controlled substance agreement. --Maribel Short MD

## 2022-10-17 NOTE — TELEPHONE ENCOUNTER
gabapentin (NEURONTIN) 300 MG capsule 90 capsule 0 7/16/2022  No   Sig: TAKE 1 CAPSULE(300 MG) BY MOUTH AT BEDTIME   Sent to pharmacy as: Gabapentin 300 MG Oral Capsule (NEURONTIN)   Class: E-Prescribe       Last office visit: 12/9/2021     Future Office Visit:   Next 5 appointments (look out 90 days)    Nov 16, 2022 10:40 AM  (Arrive by 10:20 AM)  Provider Visit with Maribel Short MD  [facility] ([facility] ) [address] [place] [id number] [phone]             CSA -- Patient Level:    CSA: None found at the patient level.             Routing refill request to provider for review/approval because:  Drug not on the [facility] refill protocol     Michelle Saez RN, BSN  [facility] Triage

## 2022-10-18 NOTE — TELEPHONE ENCOUNTER
Prior Authorization Approval    Authorization Effective Date: 10/18/2022  Authorization Expiration Date: 10/17/2024  Medication: TRINTELLIX 20 MG tablet  Approved Dose/Quantity:   Reference #: [id number]   Insurance Company: [employer] - Phone [phone] Fax [phone]  Which Pharmacy is filling the prescription (Not needed for infusion/clinic administered): [id number] [place], [place] - [address] [address].  Pharmacy Notified: Yes  Patient Notified: Yes

## 2022-10-18 NOTE — TELEPHONE ENCOUNTER
PA Initiation    Medication: TRINTELLIX 20 MG tablet  Insurance Company: [employer] - Phone [phone] Fax [phone]  Pharmacy Filling the Rx: [id number] [address] [address].  Filling Pharmacy Phone: [phone]  Filling Pharmacy Fax: [phone]  Start Date: 10/18/2022

## 2022-10-19 ENCOUNTER — TRANSFERRED RECORDS (OUTPATIENT)
Dept: HEALTH INFORMATION MANAGEMENT | Facility: CLINIC | Age: 52
End: 2022-10-19

## 2022-11-16 ENCOUNTER — OFFICE VISIT (OUTPATIENT)
Dept: FAMILY MEDICINE | Facility: CLINIC | Age: 52
End: 2022-11-16
Payer: COMMERCIAL

## 2022-11-16 VITALS
TEMPERATURE: 97.8 F | BODY MASS INDEX: 31.02 KG/M2 | WEIGHT: 193 LBS | DIASTOLIC BLOOD PRESSURE: 86 MMHG | OXYGEN SATURATION: 97 % | SYSTOLIC BLOOD PRESSURE: 130 MMHG | HEIGHT: 66 IN | HEART RATE: 110 BPM | RESPIRATION RATE: 16 BRPM

## 2022-11-16 DIAGNOSIS — H60.92 OTITIS EXTERNA OF LEFT EAR, UNSPECIFIED CHRONICITY, UNSPECIFIED TYPE: ICD-10-CM

## 2022-11-16 DIAGNOSIS — E78.5 HYPERLIPIDEMIA LDL GOAL <100: ICD-10-CM

## 2022-11-16 DIAGNOSIS — E55.9 VITAMIN D DEFICIENCY: ICD-10-CM

## 2022-11-16 DIAGNOSIS — E04.1 THYROID NODULE: ICD-10-CM

## 2022-11-16 DIAGNOSIS — N95.1 VAGINAL DRYNESS, MENOPAUSAL: ICD-10-CM

## 2022-11-16 DIAGNOSIS — F43.10 PTSD (POST-TRAUMATIC STRESS DISORDER): ICD-10-CM

## 2022-11-16 DIAGNOSIS — N95.1 PERIMENOPAUSAL VASOMOTOR SYMPTOMS: Primary | ICD-10-CM

## 2022-11-16 DIAGNOSIS — F33.0 MAJOR DEPRESSIVE DISORDER, RECURRENT EPISODE, MILD (H): ICD-10-CM

## 2022-11-16 DIAGNOSIS — Z12.11 SCREEN FOR COLON CANCER: ICD-10-CM

## 2022-11-16 DIAGNOSIS — F33.1 MAJOR DEPRESSIVE DISORDER, RECURRENT EPISODE, MODERATE (H): ICD-10-CM

## 2022-11-16 DIAGNOSIS — Z13.220 SCREENING FOR HYPERLIPIDEMIA: ICD-10-CM

## 2022-11-16 DIAGNOSIS — Z80.3 FAMILY HISTORY OF BREAST CANCER: ICD-10-CM

## 2022-11-16 DIAGNOSIS — M25.532 LEFT WRIST PAIN: ICD-10-CM

## 2022-11-16 DIAGNOSIS — R68.82 DECREASED LIBIDO: ICD-10-CM

## 2022-11-16 DIAGNOSIS — Z13.6 CARDIOVASCULAR SCREENING; LDL GOAL LESS THAN 160: ICD-10-CM

## 2022-11-16 DIAGNOSIS — F41.9 ACUTE ANXIETY: ICD-10-CM

## 2022-11-16 DIAGNOSIS — F41.9 ANXIETY: ICD-10-CM

## 2022-11-16 DIAGNOSIS — H81.12 BPPV (BENIGN PAROXYSMAL POSITIONAL VERTIGO), LEFT: ICD-10-CM

## 2022-11-16 LAB
CREAT UR-MCNC: 146 MG/DL
MICROALBUMIN UR-MCNC: <12 MG/L
MICROALBUMIN/CREAT UR: NORMAL MG/G{CREAT}

## 2022-11-16 PROCEDURE — 82043 UR ALBUMIN QUANTITATIVE: CPT | Performed by: FAMILY MEDICINE

## 2022-11-16 PROCEDURE — 99215 OFFICE O/P EST HI 40 MIN: CPT | Performed by: FAMILY MEDICINE

## 2022-11-16 RX ORDER — GABAPENTIN 300 MG/1
CAPSULE ORAL
Qty: 90 CAPSULE | Refills: 3 | Status: SHIPPED | OUTPATIENT
Start: 2022-11-16 | End: 2023-12-30

## 2022-11-16 RX ORDER — ONDANSETRON 8 MG/1
8 TABLET, ORALLY DISINTEGRATING ORAL EVERY 8 HOURS PRN
Qty: 20 TABLET | Refills: 3 | Status: SHIPPED | OUTPATIENT
Start: 2022-11-16 | End: 2023-04-14

## 2022-11-16 RX ORDER — FLURBIPROFEN 100 MG
100 TABLET ORAL 2 TIMES DAILY
COMMUNITY
Start: 2022-09-12

## 2022-11-16 RX ORDER — CYCLOBENZAPRINE HCL 10 MG
10 TABLET ORAL 2 TIMES DAILY PRN
COMMUNITY
Start: 2022-09-08 | End: 2024-08-09

## 2022-11-16 RX ORDER — GABAPENTIN 100 MG/1
100-200 CAPSULE ORAL
Qty: 90 CAPSULE | Refills: 3 | Status: SHIPPED | OUTPATIENT
Start: 2022-11-16 | End: 2024-08-09 | Stop reason: DRUGHIGH

## 2022-11-16 RX ORDER — ESTRADIOL 0.1 MG/G
1 CREAM VAGINAL
Qty: 42.5 G | Refills: 11 | Status: SHIPPED | OUTPATIENT
Start: 2022-11-17 | End: 2023-04-14 | Stop reason: SINTOL

## 2022-11-16 RX ORDER — NEOMYCIN SULFATE, POLYMYXIN B SULFATE AND HYDROCORTISONE 10; 3.5; 1 MG/ML; MG/ML; [USP'U]/ML
3 SUSPENSION/ DROPS AURICULAR (OTIC) 3 TIMES DAILY
Qty: 10 ML | Refills: 1 | Status: SHIPPED | OUTPATIENT
Start: 2022-11-16 | End: 2022-11-23

## 2022-11-16 RX ORDER — ATORVASTATIN CALCIUM 20 MG/1
20 TABLET, FILM COATED ORAL DAILY
Qty: 90 TABLET | Refills: 1 | Status: SHIPPED | OUTPATIENT
Start: 2022-11-16 | End: 2023-04-14

## 2022-11-16 ASSESSMENT — PATIENT HEALTH QUESTIONNAIRE - PHQ9
SUM OF ALL RESPONSES TO PHQ QUESTIONS 1-9: 5
10. IF YOU CHECKED OFF ANY PROBLEMS, HOW DIFFICULT HAVE THESE PROBLEMS MADE IT FOR YOU TO DO YOUR WORK, TAKE CARE OF THINGS AT HOME, OR GET ALONG WITH OTHER PEOPLE: NOT DIFFICULT AT ALL
SUM OF ALL RESPONSES TO PHQ QUESTIONS 1-9: 5

## 2022-11-16 ASSESSMENT — ANXIETY QUESTIONNAIRES
GAD7 TOTAL SCORE: 6
8. IF YOU CHECKED OFF ANY PROBLEMS, HOW DIFFICULT HAVE THESE MADE IT FOR YOU TO DO YOUR WORK, TAKE CARE OF THINGS AT HOME, OR GET ALONG WITH OTHER PEOPLE?: NOT DIFFICULT AT ALL
6. BECOMING EASILY ANNOYED OR IRRITABLE: SEVERAL DAYS
1. FEELING NERVOUS, ANXIOUS, OR ON EDGE: SEVERAL DAYS
5. BEING SO RESTLESS THAT IT IS HARD TO SIT STILL: NOT AT ALL
IF YOU CHECKED OFF ANY PROBLEMS ON THIS QUESTIONNAIRE, HOW DIFFICULT HAVE THESE PROBLEMS MADE IT FOR YOU TO DO YOUR WORK, TAKE CARE OF THINGS AT HOME, OR GET ALONG WITH OTHER PEOPLE: NOT DIFFICULT AT ALL
GAD7 TOTAL SCORE: 6
2. NOT BEING ABLE TO STOP OR CONTROL WORRYING: SEVERAL DAYS
7. FEELING AFRAID AS IF SOMETHING AWFUL MIGHT HAPPEN: SEVERAL DAYS
4. TROUBLE RELAXING: SEVERAL DAYS
7. FEELING AFRAID AS IF SOMETHING AWFUL MIGHT HAPPEN: SEVERAL DAYS
3. WORRYING TOO MUCH ABOUT DIFFERENT THINGS: SEVERAL DAYS
GAD7 TOTAL SCORE: 6

## 2022-11-16 NOTE — PATIENT INSTRUCTIONS
" [facility]  [address] [place] [address]  Office: [phone]   Fax:    [phone]     Mental health referral placed. Counseling coverage is very insurance driven. An intake person will call pt , but they  can narrow down possible choices by going to [employer].  In the search field type in therapists or counselors in the the [place] metro area and a bunch of therapists choices will come up with their pictures, backrounds, education and their areas of expertise.       Return in 1 month (on 12/16/2022) for Physical/Preventative Visit, w/ Dr. GUERRERO for 40 minute appointment.  Future Appointments 11/16/2022 - 5/15/2023        Date Visit Type Length Department Provider     12/16/2022  9:00 AM PREVENTATIVE ADULT            40 min [facility] Maribel Short MD    Location Instructions:     [facility] is located at [address] [place] [address], along [address] Free parking is available; access the lot by turning north from [address] onto [address], then west onto [address].                    Please come in fasting :  nothing to eat for at least 8 , preferably 12 hours ahead of appointment. Please do come in well hydrated though - ok to  have water, black coffee or plain tea, BUT NO creamers or sweeteners of any kind, please.        Thank you so much or choosing [facility]  for your Health Care. It was a pleasure seeing you at your visit today! Please contact us with any questions or concerns you may have.                   Maribel Short MD                              To reach your [facility] care team after hours call:   [phone] press #2 \"to speak with your care team\".  This will get you to our clinic instead of routing to central [facility]  scheduling.     PLEASE NOTE OUR HOURS HAVE CHANGED secondary to COVID-19 coronavirus pandemic, as " we are trying to minimize patient exposure to the virus,  which is now widespread in the state.  These hours may change with very little notice.  We apologize for any inconvenience.       Our current clinic hours are:          Monday- Thursday   7:00am - 6:00pm  in person.      Friday  7:00am- 5:00pm                       Saturday and Sunday : Closed to in person and virtual visits        We have telephone and virtual visit times available between    7:00am - 6pm on Monday-Friday as well.                                                Phone:  [phone]      Our pharmacy hours: Monday through Friday 8:00am to 5:00pm                        Saturday - 9:00 am to 12 noon       Sunday : Closed.              Phone:  [phone]              ###  Please note: at this time we are not accepting any walk-in visits. ###      There is also information available at our web site:  [email or web address]    If your provider ordered any lab tests and you do not receive the results within 10 business days, please call the clinic.    If you need a medication refill please contact your pharmacy.  Please allow 3 business days for your refill to be completed.    Our clinic offers telephone visits and e visits.  Please ask one of your team members to explain more.      Use [employer] (secure email communication and access to your chart) to send your primary care provider a message or make an appointment. Ask someone on your Team how to sign up for [employer].

## 2022-12-09 ENCOUNTER — TELEPHONE (OUTPATIENT)
Dept: GASTROENTEROLOGY | Facility: CLINIC | Age: 52
End: 2022-12-09

## 2022-12-09 NOTE — TELEPHONE ENCOUNTER
Screening Questions    BlueKIND OF PREP RedLOCATION [review exclusion criteria] GreenSEDATION TYPE    n Have you had a positive covid test in the last 2 weeks?   If yes, what date? Schedule out 14 days from symptoms  y Your able to give consent for your medical care?  Y Are you active on mychart?   HP What insurance do you carry?    LESTER ARZOLA Ordering/Referring Provider:     31.6  BMI [BMI OVER 40-EXTENDED PREP]  BMI OVER 40 NEED PAC EVALUATION FOR [facility]    Respiratory Screening  [If yes to any of the following HOSPITAL setting only]     N      Do you use daily home oxygen?   N      Do you have mod to severe Obstructive Sleep Apnea?  N      Do you have Pulmonary Hypertension? NEED PAC APPT AT [facility]    N      Do you have UNCONTROLLED asthma?      N Have you had a heart or lung transplant?       N Are you currently on dialysis? [If yes, G-PREP & HOSPITAL setting only]   N  Do you have chronic kidney disease? [If yes, G-PREP]  N  Do you have a diagnosis of diabetes?[If yes, G-PREP]    N Have you had a stroke or Transient ischemic attack (TIA - aka  mini stroke ) within 6 months? (If yes, please review exclusion criteria)  N  In the past 6 months, have you had any heart related issues including cardiomyopathy or heart attack?   N  If yes, did it require cardiac stenting or other implantable device?       N Do you have any implantable devices in your body (pacemaker, defib, LVAD)? (If yes, please review exclusion criteria)    N Do you take nitroglycerin?   N If yes, how often?  (If yes, HOSPITAL setting ONLY)  N Are you currently taking any blood thinners?           [IF YES, INFORM PATIENT TO FOLLOW UP W/ ORDERING PROVIDER FOR BRIDGING INSTRUCTIONS]     N  Do you take Phentermine?   Yes-> Hold for 7 days before procedure.  Please consult your prescribing provider if you have questions about holding this  medication.         [FEMALES] are you currently pregnant?       If yes, how many weeks? [Greater than 12 weeks, OR NEEDED]    N Any prescription pain medications taken daily like narcotics? [EXTENDED PREP AND MAC]    N  Any chemical dependencies such as alcohol, street drugs, or methadone? [If yes, MAC]    Y Any post-traumatic stress syndrome, severe anxiety or history of psychosis? [If yes, MAC]    N Do you need help transferring? (If NO, please HOSPITAL setting  only)     N  On a regular basis do you go 3-5 days without a bowel movement?[ If yes, EXTENDED PREP.]     Preferred LOCAL Pharmacy for Pre Prescription:     [id number] [place], [place] - [address] [address].                        Scheduling Details    Milagro Caller:   (Please ask for phone number if not scheduled by patient)    Type of Procedure Scheduled: Lower Endoscopy [Colonoscopy]      STANDARD [facility]-If you answer yes to questions #8, #20, #21 Which Colonoscopy Prep was Sent:   TIKA CF PATIENTS & GROEN'S PATIENTS NEEDS EXTENDED PREP    2/22 Date of Procedure:   GALEN Surgeon:   FRIDA Location:     MAC Sedation Type:     Conscious Sedation- Needs  for 6 hours after the procedure  MAC/General-Needs  for 24 hours after procedure    Y Informed patient they will need an adult          Cannot take any type of public or medical transportation alone    Y Confirmed Nurse will call to complete assessment      Pre-Procedure Covid test to be completed [ESSC PCR Testing Required]    DOUBLE CHECK BMI AND NATALYA  NEEDS ADULT -CANNOT TAKE PUBILC OR MEDICAL TRANSPOTATION  COVID TEST FOR [facility] 3-4 DAYS  COIVD TEST FOR [facility] CAN BE HOME     Additional comments:

## 2022-12-16 ENCOUNTER — OFFICE VISIT (OUTPATIENT)
Dept: FAMILY MEDICINE | Facility: CLINIC | Age: 52
End: 2022-12-16
Payer: COMMERCIAL

## 2022-12-16 VITALS
BODY MASS INDEX: 30.53 KG/M2 | HEART RATE: 100 BPM | DIASTOLIC BLOOD PRESSURE: 86 MMHG | HEIGHT: 66 IN | WEIGHT: 190 LBS | OXYGEN SATURATION: 96 % | TEMPERATURE: 98.4 F | SYSTOLIC BLOOD PRESSURE: 128 MMHG

## 2022-12-16 DIAGNOSIS — Z12.11 SCREEN FOR COLON CANCER: ICD-10-CM

## 2022-12-16 DIAGNOSIS — Z91.410 HISTORY OF SEXUAL ABUSE IN ADULTHOOD: ICD-10-CM

## 2022-12-16 DIAGNOSIS — E04.1 THYROID NODULE: ICD-10-CM

## 2022-12-16 DIAGNOSIS — F43.10 PTSD (POST-TRAUMATIC STRESS DISORDER): ICD-10-CM

## 2022-12-16 DIAGNOSIS — F41.9 ANXIETY: ICD-10-CM

## 2022-12-16 DIAGNOSIS — N95.1 SYMPTOMATIC MENOPAUSAL OR FEMALE CLIMACTERIC STATES: ICD-10-CM

## 2022-12-16 DIAGNOSIS — R29.890 LOSS OF HEIGHT: ICD-10-CM

## 2022-12-16 DIAGNOSIS — E78.5 HYPERLIPIDEMIA LDL GOAL <100: ICD-10-CM

## 2022-12-16 DIAGNOSIS — Z00.00 ROUTINE GENERAL MEDICAL EXAMINATION AT A HEALTH CARE FACILITY: Primary | ICD-10-CM

## 2022-12-16 DIAGNOSIS — Z13.6 CARDIOVASCULAR SCREENING; LDL GOAL LESS THAN 160: ICD-10-CM

## 2022-12-16 DIAGNOSIS — Z12.31 VISIT FOR SCREENING MAMMOGRAM: ICD-10-CM

## 2022-12-16 DIAGNOSIS — F33.0 MAJOR DEPRESSIVE DISORDER, RECURRENT EPISODE, MILD (H): ICD-10-CM

## 2022-12-16 DIAGNOSIS — E55.9 VITAMIN D DEFICIENCY: ICD-10-CM

## 2022-12-16 LAB
ALBUMIN SERPL BCG-MCNC: 4 G/DL (ref 3.5–5.2)
ALP SERPL-CCNC: 79 U/L (ref 35–104)
ALT SERPL W P-5'-P-CCNC: 14 U/L (ref 10–35)
ANION GAP SERPL CALCULATED.3IONS-SCNC: 9 MMOL/L (ref 7–15)
AST SERPL W P-5'-P-CCNC: 18 U/L (ref 10–35)
BILIRUB SERPL-MCNC: 0.3 MG/DL
BUN SERPL-MCNC: 18.4 MG/DL (ref 6–20)
CALCIUM SERPL-MCNC: 9 MG/DL (ref 8.6–10)
CHLORIDE SERPL-SCNC: 102 MMOL/L (ref 98–107)
CHOLEST SERPL-MCNC: 189 MG/DL
CREAT SERPL-MCNC: 0.84 MG/DL (ref 0.51–0.95)
DEPRECATED HCO3 PLAS-SCNC: 29 MMOL/L (ref 22–29)
ERYTHROCYTE [DISTWIDTH] IN BLOOD BY AUTOMATED COUNT: 11.9 % (ref 10–15)
GFR SERPL CREATININE-BSD FRML MDRD: 83 ML/MIN/1.73M2
GLUCOSE SERPL-MCNC: 86 MG/DL (ref 70–99)
HCT VFR BLD AUTO: 40.1 % (ref 35–47)
HDLC SERPL-MCNC: 58 MG/DL
HGB BLD-MCNC: 13.3 G/DL (ref 11.7–15.7)
LDLC SERPL CALC-MCNC: 90 MG/DL
MCH RBC QN AUTO: 30.6 PG (ref 26.5–33)
MCHC RBC AUTO-ENTMCNC: 33.2 G/DL (ref 31.5–36.5)
MCV RBC AUTO: 92 FL (ref 78–100)
NONHDLC SERPL-MCNC: 131 MG/DL
PLATELET # BLD AUTO: 171 10E3/UL (ref 150–450)
POTASSIUM SERPL-SCNC: 4.1 MMOL/L (ref 3.4–5.3)
PROT SERPL-MCNC: 6.3 G/DL (ref 6.4–8.3)
RBC # BLD AUTO: 4.35 10E6/UL (ref 3.8–5.2)
SODIUM SERPL-SCNC: 140 MMOL/L (ref 136–145)
TRIGL SERPL-MCNC: 205 MG/DL
TSH SERPL DL<=0.005 MIU/L-ACNC: 2.15 UIU/ML (ref 0.3–4.2)
WBC # BLD AUTO: 7.4 10E3/UL (ref 4–11)

## 2022-12-16 PROCEDURE — 36415 COLL VENOUS BLD VENIPUNCTURE: CPT | Performed by: FAMILY MEDICINE

## 2022-12-16 PROCEDURE — 80061 LIPID PANEL: CPT | Performed by: FAMILY MEDICINE

## 2022-12-16 PROCEDURE — 99396 PREV VISIT EST AGE 40-64: CPT | Mod: 25 | Performed by: FAMILY MEDICINE

## 2022-12-16 PROCEDURE — 84443 ASSAY THYROID STIM HORMONE: CPT | Performed by: FAMILY MEDICINE

## 2022-12-16 PROCEDURE — 85027 COMPLETE CBC AUTOMATED: CPT | Performed by: FAMILY MEDICINE

## 2022-12-16 PROCEDURE — 80053 COMPREHEN METABOLIC PANEL: CPT | Performed by: FAMILY MEDICINE

## 2022-12-16 PROCEDURE — 82306 VITAMIN D 25 HYDROXY: CPT | Performed by: FAMILY MEDICINE

## 2022-12-16 PROCEDURE — 99213 OFFICE O/P EST LOW 20 MIN: CPT | Mod: 25 | Performed by: FAMILY MEDICINE

## 2022-12-16 ASSESSMENT — ENCOUNTER SYMPTOMS
JOINT SWELLING: 0
HEMATOCHEZIA: 0
EYE PAIN: 0
DIZZINESS: 0
ABDOMINAL PAIN: 0
ARTHRALGIAS: 1
SHORTNESS OF BREATH: 0
COUGH: 0
FEVER: 0
DIARRHEA: 0
MYALGIAS: 0
HEARTBURN: 0
WEAKNESS: 0
FREQUENCY: 0
CONSTIPATION: 0
SORE THROAT: 0
HEMATURIA: 0
DYSURIA: 0
HEADACHES: 0
NERVOUS/ANXIOUS: 0
BREAST MASS: 0
PARESTHESIAS: 0
CHILLS: 0
PALPITATIONS: 0
NAUSEA: 0

## 2022-12-16 NOTE — PATIENT INSTRUCTIONS
[facility]  [address] [place] [address]  Office: [phone]   Fax:    [phone]       Preventive Health Recommendations  Female Ages 50 - 64    Yearly exam: See your health care provider every year in order to  Review health changes.   Discuss preventive care.    Review your medicines if your doctor has prescribed any.    Get a Pap test every three years (unless you have an abnormal result and your provider advises testing more often).  If you get Pap tests with HPV test, you only need to test every 5 years, unless you have an abnormal result.   You do not need a Pap test if your uterus was removed (hysterectomy) and you have not had cancer.  You should be tested each year for STDs (sexually transmitted diseases) if you're at risk.   Have a mammogram every 1 to 2 years.  Have a colonoscopy at age 50, or have a yearly FIT test (stool test). These exams screen for colon cancer.    Have a cholesterol test every 5 years, or more often if advised.  Have a diabetes test (fasting glucose) every three years. If you are at risk for diabetes, you should have this test more often.   If you are at risk for osteoporosis (brittle bone disease), think about having a bone density scan (DEXA).    Shots: Get a flu shot each year. Get a tetanus shot every 10 years.    Nutrition:   Eat at least 5 servings of fruits and vegetables each day.  Eat whole-grain bread, whole-wheat pasta and brown rice instead of white grains and rice.  Get adequate Calcium and Vitamin D.     Lifestyle  Exercise at least 150 minutes a week (30 minutes a day, 5 days a week). This will help you control your weight and prevent disease.  Limit alcohol to one drink per day.  No smoking.   Wear sunscreen to prevent skin cancer.   See your dentist every six months for an exam and cleaning.  See your eye doctor every 1 to 2 years.        Thank you so much or choosing [facility]  for your Health  "Care. It was a pleasure seeing you at your visit today! Please contact us with any questions or concerns you may have.                   Maribel Short MD                              To reach your [facility] care team after hours call:   [phone] press #[phone] \"to speak with your care team\".  This will get you to our clinic instead of routing to central [facility]  scheduling.     PLEASE NOTE OUR HOURS HAVE CHANGED secondary to COVID-19 coronavirus pandemic, as we are trying to minimize patient exposure to the virus,  which is now widespread in the [facility].  These hours may change with very little notice.  We apologize for any inconvenience.       Our current clinic hours are:          Monday- Thursday   7:00am - 6:00pm  in person.      Friday  7:00am- 5:00pm                       Saturday and Sunday : Closed to in person and virtual visits        We have telephone and virtual visit times available between    7:00am - 6pm on Monday-Friday as well.                                                Phone:  [phone]      Our pharmacy hours: Monday through Friday 8:00am to 5:00pm                        Saturday - 9:00 am to 12 noon       Sunday : Closed.              Phone:  [phone]              ###  Please note: at this time we are not accepting any walk-in visits. ###      There is also information available at our web site:  [email or web address]    If your provider ordered any lab tests and you do not receive the results within 10 business days, please call the clinic.    If you need a medication refill please contact your pharmacy.  Please allow 3 business days for your refill to be completed.    Our clinic offers telephone visits and e visits.  Please ask one of your team members to explain more.      Use [employer] (secure email communication and access to your chart) to send your primary care provider a message or make an appointment. Ask someone on your Team how to sign up " for [employer] [facility].
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
## 2022-12-21 LAB
DEPRECATED CALCIDIOL+CALCIFEROL SERPL-MC: <36 UG/L (ref 20–75)
VITAMIN D2 SERPL-MCNC: <5 UG/L
VITAMIN D3 SERPL-MCNC: 31 UG/L

## 2023-02-02 ENCOUNTER — ANCILLARY PROCEDURE (OUTPATIENT)
Dept: BONE DENSITY | Facility: CLINIC | Age: 53
End: 2023-02-02
Attending: FAMILY MEDICINE
Payer: COMMERCIAL

## 2023-02-02 DIAGNOSIS — N95.1 SYMPTOMATIC MENOPAUSAL OR FEMALE CLIMACTERIC STATES: ICD-10-CM

## 2023-02-02 DIAGNOSIS — R29.890 LOSS OF HEIGHT: ICD-10-CM

## 2023-02-02 PROCEDURE — 77080 DXA BONE DENSITY AXIAL: CPT | Performed by: INTERNAL MEDICINE

## 2023-02-12 ENCOUNTER — MYC MEDICAL ADVICE (OUTPATIENT)
Dept: FAMILY MEDICINE | Facility: CLINIC | Age: 53
End: 2023-02-12
Payer: COMMERCIAL

## 2023-02-14 RX ORDER — BISACODYL 5 MG
TABLET, DELAYED RELEASE (ENTERIC COATED) ORAL
Qty: 4 TABLET | Refills: 0 | Status: SHIPPED | OUTPATIENT
Start: 2023-02-14 | End: 2023-04-14

## 2023-02-15 ENCOUNTER — OFFICE VISIT (OUTPATIENT)
Dept: FAMILY MEDICINE | Facility: CLINIC | Age: 53
End: 2023-02-15
Payer: COMMERCIAL

## 2023-02-15 VITALS
BODY MASS INDEX: 31.82 KG/M2 | DIASTOLIC BLOOD PRESSURE: 82 MMHG | SYSTOLIC BLOOD PRESSURE: 122 MMHG | RESPIRATION RATE: 16 BRPM | HEART RATE: 80 BPM | OXYGEN SATURATION: 99 % | TEMPERATURE: 97.8 F | HEIGHT: 65 IN | WEIGHT: 191 LBS

## 2023-02-15 DIAGNOSIS — Z12.11 SCREEN FOR COLON CANCER: ICD-10-CM

## 2023-02-15 DIAGNOSIS — F33.0 MAJOR DEPRESSIVE DISORDER, RECURRENT EPISODE, MILD (H): ICD-10-CM

## 2023-02-15 DIAGNOSIS — F41.9 ANXIETY: ICD-10-CM

## 2023-02-15 DIAGNOSIS — Z01.818 PREOPERATIVE CLEARANCE: Primary | ICD-10-CM

## 2023-02-15 DIAGNOSIS — L40.50 PSORIATIC ARTHRITIS (H): ICD-10-CM

## 2023-02-15 LAB
ERYTHROCYTE [DISTWIDTH] IN BLOOD BY AUTOMATED COUNT: 11.5 % (ref 10–15)
HCT VFR BLD AUTO: 38.2 % (ref 35–47)
HGB BLD-MCNC: 12.5 G/DL (ref 11.7–15.7)
MCH RBC QN AUTO: 29.7 PG (ref 26.5–33)
MCHC RBC AUTO-ENTMCNC: 32.7 G/DL (ref 31.5–36.5)
MCV RBC AUTO: 91 FL (ref 78–100)
PLATELET # BLD AUTO: 162 10E3/UL (ref 150–450)
RBC # BLD AUTO: 4.21 10E6/UL (ref 3.8–5.2)
WBC # BLD AUTO: 5 10E3/UL (ref 4–11)

## 2023-02-15 PROCEDURE — 36415 COLL VENOUS BLD VENIPUNCTURE: CPT | Performed by: INTERNAL MEDICINE

## 2023-02-15 PROCEDURE — 93000 ELECTROCARDIOGRAM COMPLETE: CPT | Performed by: INTERNAL MEDICINE

## 2023-02-15 PROCEDURE — 99214 OFFICE O/P EST MOD 30 MIN: CPT | Performed by: INTERNAL MEDICINE

## 2023-02-15 PROCEDURE — 85027 COMPLETE CBC AUTOMATED: CPT | Performed by: INTERNAL MEDICINE

## 2023-02-15 ASSESSMENT — PAIN SCALES - GENERAL: PAINLEVEL: NO PAIN (0)

## 2023-02-15 NOTE — PATIENT INSTRUCTIONS
As discussed , please do pertinent work up and CBC.     ===================================  For informational purposes only. Not to replace the advice of your health care provider. Copyright   ,  [employer]. All rights reserved. Clinically reviewed by Hailey Salazar MD. [employer] [id number] - REV .  Preparing for Your Surgery  Getting started  A nurse will call you to review your health history and instructions. They will give you an arrival time based on your scheduled surgery time. Please be ready to share:  Your doctor's clinic name and phone number  Your medical, surgical, and anesthesia history  A list of allergies and sensitivities  A list of medicines, including herbal treatments and over-the-counter drugs  Whether the patient has a legal guardian (ask how to send us the papers in advance)  Please tell us if you're pregnant--or if there's any chance you might be pregnant. Some surgeries may injure a fetus (unborn baby), so they require a pregnancy test. Surgeries that are safe for a fetus don't always need a test, and you can choose whether to have one.   If you have a child who's having surgery, please ask for a copy of Preparing for Your Child's Surgery.    Preparing for surgery  Within 10 to 30 days of surgery: Have a pre-op exam (sometimes called an H&P, or History and Physical). This can be done at a clinic or pre-operative center.  If you're having a , you may not need this exam. Talk to your care team.  At your pre-op exam, talk to your care team about all medicines you take. If you need to stop any medicines before surgery, ask when to start taking them again.  We do this for your safety. Many medicines can make you bleed too much during surgery. Some change how well surgery (anesthesia) drugs work.  Call your insurance company to let them know you're having surgery. (If you don't have insurance, call [phone].)  Call your clinic if there's any change in your health.  This includes signs of a cold or flu (sore throat, runny nose, cough, rash, fever). It also includes a scrape or scratch near the surgery site.  If you have questions on the day of surgery, call your hospital or surgery center.  Eating and drinking guidelines  For your safety: Unless your surgeon tells you otherwise, follow the guidelines below.  Eat and drink as usual until 8 hours before you arrive for surgery. After that, no food or milk.  Drink clear liquids until 2 hours before you arrive. These are liquids you can see through, like water, Gatorade, and Propel Water. They also include plain black coffee and tea (no cream or milk), candy, and breath mints. You can spit out gum when you arrive.  If you drink alcohol: Stop drinking it the night before surgery.  If your care team tells you to take medicine on the morning of surgery, it's okay to take it with a sip of water.  Preventing infection  Shower or bathe the night before and morning of your surgery. Follow the instructions your clinic gave you. (If no instructions, use regular soap.)  Don't shave or clip hair near your surgery site. We'll remove the hair if needed.  Don't smoke or vape the morning of surgery. You may chew nicotine gum up to 2 hours before surgery. A nicotine patch is okay.  Note: Some surgeries require you to completely quit smoking and nicotine. Check with your surgeon.  Your care team will make every effort to keep you safe from infection. We will:  Clean our hands often with soap and water (or an alcohol-based hand rub).  Clean the skin at your surgery site with a special soap that kills germs.  Give you a special gown to keep you warm. (Cold raises the risk of infection.)  Wear special hair covers, masks, gowns and gloves during surgery.  Give antibiotic medicine, if prescribed. Not all surgeries need antibiotics.  What to bring on the day of surgery  Photo ID and insurance card  Copy of your health care directive, if you have  one  Glasses and hearing aids (bring cases)  You can't wear contacts during surgery  Inhaler and eye drops, if you use them (tell us about these when you arrive)  CPAP machine or breathing device, if you use them  A few personal items, if spending the night  If you have . . .  A pacemaker, ICD (cardiac defibrillator) or other implant: Bring the ID card.  An implanted stimulator: Bring the remote control.  A legal guardian: Bring a copy of the certified (court-stamped) guardianship papers.  Please remove any jewelry, including body piercings. Leave jewelry and other valuables at home.  If you're going home the day of surgery  You must have a responsible adult drive you home. They should stay with you overnight as well.  If you don't have someone to stay with you, and you aren't safe to go home alone, we may keep you overnight. Insurance often won't pay for this.  After surgery  If it's hard to control your pain or you need more pain medicine, please call your surgeon's office.  Questions?   If you have any questions for your care team, list them here: _________________________________________________________________________________________________________________________________________________________________________ ____________________________________ ____________________________________ ____________________________________
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
## 2023-02-17 ENCOUNTER — TRANSFERRED RECORDS (OUTPATIENT)
Dept: HEALTH INFORMATION MANAGEMENT | Facility: CLINIC | Age: 53
End: 2023-02-17

## 2023-02-22 ENCOUNTER — ANESTHESIA (OUTPATIENT)
Dept: GASTROENTEROLOGY | Facility: CLINIC | Age: 53
End: 2023-02-22
Payer: COMMERCIAL

## 2023-02-22 ENCOUNTER — HOSPITAL ENCOUNTER (OUTPATIENT)
Facility: CLINIC | Age: 53
Discharge: HOME | End: 2023-02-22
Attending: COLON & RECTAL SURGERY | Admitting: COLON & RECTAL SURGERY
Payer: COMMERCIAL

## 2023-02-22 ENCOUNTER — ANESTHESIA EVENT (OUTPATIENT)
Dept: GASTROENTEROLOGY | Facility: CLINIC | Age: 53
End: 2023-02-22
Payer: COMMERCIAL

## 2023-02-22 VITALS
OXYGEN SATURATION: 100 % | RESPIRATION RATE: 15 BRPM | DIASTOLIC BLOOD PRESSURE: 64 MMHG | SYSTOLIC BLOOD PRESSURE: 99 MMHG | HEART RATE: 73 BPM

## 2023-02-22 DIAGNOSIS — Z12.11 ENCOUNTER FOR SCREENING COLONOSCOPY: Primary | ICD-10-CM

## 2023-02-22 LAB — COLONOSCOPY: NORMAL

## 2023-02-22 PROCEDURE — 999N000010 HC STATISTIC ANES STAT CODE-CRNA PER MINUTE: Performed by: COLON & RECTAL SURGERY

## 2023-02-22 PROCEDURE — G0121 COLON CA SCRN NOT HI RSK IND: HCPCS | Performed by: COLON & RECTAL SURGERY

## 2023-02-22 PROCEDURE — 258N000003 HC RX IP 258 OP 636: Performed by: NURSE ANESTHETIST, CERTIFIED REGISTERED

## 2023-02-22 PROCEDURE — 370N000017 HC ANESTHESIA TECHNICAL FEE, PER MIN: Performed by: COLON & RECTAL SURGERY

## 2023-02-22 PROCEDURE — 250N000011 HC RX IP 250 OP 636: Performed by: NURSE ANESTHETIST, CERTIFIED REGISTERED

## 2023-02-22 PROCEDURE — 45378 DIAGNOSTIC COLONOSCOPY: CPT | Performed by: COLON & RECTAL SURGERY

## 2023-02-22 RX ORDER — PROPOFOL 10 MG/ML
INJECTION, EMULSION INTRAVENOUS PRN
Status: DISCONTINUED | OUTPATIENT
Start: 2023-02-22 | End: 2023-02-22

## 2023-02-22 RX ORDER — PROPOFOL 10 MG/ML
INJECTION, EMULSION INTRAVENOUS CONTINUOUS PRN
Status: DISCONTINUED | OUTPATIENT
Start: 2023-02-22 | End: 2023-02-22

## 2023-02-22 RX ORDER — ONDANSETRON 2 MG/ML
INJECTION INTRAMUSCULAR; INTRAVENOUS PRN
Status: DISCONTINUED | OUTPATIENT
Start: 2023-02-22 | End: 2023-02-22

## 2023-02-22 RX ORDER — SODIUM CHLORIDE, SODIUM LACTATE, POTASSIUM CHLORIDE, CALCIUM CHLORIDE 600; 310; 30; 20 MG/100ML; MG/100ML; MG/100ML; MG/100ML
INJECTION, SOLUTION INTRAVENOUS CONTINUOUS PRN
Status: DISCONTINUED | OUTPATIENT
Start: 2023-02-22 | End: 2023-02-22

## 2023-02-22 RX ORDER — LIDOCAINE 40 MG/G
CREAM TOPICAL
Status: DISCONTINUED | OUTPATIENT
Start: 2023-02-22 | End: 2023-02-22 | Stop reason: HOSPADM

## 2023-02-22 RX ORDER — ONDANSETRON 2 MG/ML
4 INJECTION INTRAMUSCULAR; INTRAVENOUS
Status: DISCONTINUED | OUTPATIENT
Start: 2023-02-22 | End: 2023-02-22 | Stop reason: HOSPADM

## 2023-02-22 RX ADMIN — PROPOFOL 30 MG: 10 INJECTION, EMULSION INTRAVENOUS at 12:48

## 2023-02-22 RX ADMIN — ONDANSETRON 4 MG: 2 INJECTION INTRAMUSCULAR; INTRAVENOUS at 12:47

## 2023-02-22 RX ADMIN — SODIUM CHLORIDE, POTASSIUM CHLORIDE, SODIUM LACTATE AND CALCIUM CHLORIDE: 600; 310; 30; 20 INJECTION, SOLUTION INTRAVENOUS at 12:43

## 2023-02-22 RX ADMIN — PROPOFOL 150 MCG/KG/MIN: 10 INJECTION, EMULSION INTRAVENOUS at 12:46

## 2023-02-22 RX ADMIN — PROPOFOL 30 MG: 10 INJECTION, EMULSION INTRAVENOUS at 12:46

## 2023-02-22 ASSESSMENT — ACTIVITIES OF DAILY LIVING (ADL): ADLS_ACUITY_SCORE: 35

## 2023-02-22 ASSESSMENT — COPD QUESTIONNAIRES: COPD: 0

## 2023-02-22 ASSESSMENT — ENCOUNTER SYMPTOMS: SEIZURES: 0

## 2023-02-22 NOTE — H&P
History and Physical Examination reviewed  Will proceed with screening colonoscopy under [place] AARON MD

## 2023-02-22 NOTE — ANESTHESIA POSTPROCEDURE EVALUATION
Patient: Milagro Frye    Procedure: Procedure(s):  COLONOSCOPY       Anesthesia Type:  MAC    Note:  Disposition: Outpatient   Postop Pain Control: Uneventful            Sign Out: Well controlled pain   PONV: No   Neuro/Psych: Uneventful            Sign Out: Acceptable/Baseline neuro status   Airway/Respiratory: Uneventful            Sign Out: Acceptable/Baseline resp. status   CV/Hemodynamics: Uneventful            Sign Out: Acceptable CV status   Other NRE:    DID A NON-ROUTINE EVENT OCCUR? No           Last vitals:  Vitals Value Taken Time   BP 99/60 02/22/23 1306   Temp     Pulse 73 02/22/23 1310   Resp 15 02/22/23 1310   SpO2 100 % 02/22/23 1310   Vitals shown include unvalidated device data.    Electronically Signed By: Ese Taveras  February 22, 2023  1:11 PM

## 2023-02-22 NOTE — ANESTHESIA CARE TRANSFER NOTE
Patient: Milagro Frye    Procedure: Procedure(s):  COLONOSCOPY       Diagnosis: Screen for colon cancer [Z12.11]  Diagnosis Additional Information: No value filed.    Anesthesia Type:   MAC     Note:    Oropharynx: oropharynx clear of all foreign objects and spontaneously breathing  Level of Consciousness: awake  Oxygen Supplementation: face mask  Level of Supplemental Oxygen (L/min / FiO2): 6  Independent Airway: airway patency satisfactory and stable  Dentition: dentition unchanged  Vital Signs Stable: post-procedure vital signs reviewed and stable  Report to RN Given: handoff report given  Patient transferred to: PACU  Comments: At end of procedure, spontaneous respirations, patient alert to voice, able to follow commands. Oxygen via facemask at 6 liters per minute to PACU. Oxygen tubing connected to wall O2 in PACU, SpO2, NiBP, and EKG monitors and alarms on and functioning, Parvez Hugger warmer connected to patient gown, report on patient's clinical status given to PACU RN, RN questions answered.  Handoff Report: Identifed the Patient, Identified the Reponsible Provider, Reviewed the pertinent medical history, Discussed the surgical course, Reviewed Intra-OP anesthesia mangement and issues during anesthesia, Set expectations for post-procedure period and Allowed opportunity for questions and acknowledgement of understanding      Vitals:  Vitals Value Taken Time   BP     Temp     Pulse     Resp     SpO2         Electronically Signed By: STARLA Tomlin CRNA  February 22, 2023  1:08 PM
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
## 2023-03-05 ENCOUNTER — MYC MEDICAL ADVICE (OUTPATIENT)
Dept: FAMILY MEDICINE | Facility: CLINIC | Age: 53
End: 2023-03-05
Payer: COMMERCIAL

## 2023-03-07 RX ORDER — FOLIC ACID 1 MG/1
TABLET ORAL
COMMUNITY
Start: 2023-02-17

## 2023-03-07 RX ORDER — METHOTREXATE 2.5 MG/1
TABLET ORAL
COMMUNITY
Start: 2023-02-17 | End: 2023-03-07

## 2023-03-29 ENCOUNTER — TRANSFERRED RECORDS (OUTPATIENT)
Dept: HEALTH INFORMATION MANAGEMENT | Facility: CLINIC | Age: 53
End: 2023-03-29
Payer: COMMERCIAL

## 2023-03-29 LAB
ALT SERPL-CCNC: 30 IU/L (ref 5–35)
AST SERPL-CCNC: 34 U/L (ref 5–34)
CREATININE (EXTERNAL): 0.73 MG/DL (ref 0.5–1.3)

## 2023-03-31 ENCOUNTER — TRANSFERRED RECORDS (OUTPATIENT)
Dept: HEALTH INFORMATION MANAGEMENT | Facility: CLINIC | Age: 53
End: 2023-03-31
Payer: COMMERCIAL

## 2023-04-14 ENCOUNTER — VIRTUAL VISIT (OUTPATIENT)
Dept: FAMILY MEDICINE | Facility: CLINIC | Age: 53
End: 2023-04-14
Payer: COMMERCIAL

## 2023-04-14 DIAGNOSIS — E78.5 HYPERLIPIDEMIA LDL GOAL <100: ICD-10-CM

## 2023-04-14 DIAGNOSIS — N95.2 ATROPHIC VAGINITIS: ICD-10-CM

## 2023-04-14 DIAGNOSIS — F33.1 MAJOR DEPRESSIVE DISORDER, RECURRENT EPISODE, MODERATE (H): ICD-10-CM

## 2023-04-14 DIAGNOSIS — H81.12 BPPV (BENIGN PAROXYSMAL POSITIONAL VERTIGO), LEFT: ICD-10-CM

## 2023-04-14 DIAGNOSIS — F41.9 ACUTE ANXIETY: ICD-10-CM

## 2023-04-14 DIAGNOSIS — R11.0 NAUSEA: ICD-10-CM

## 2023-04-14 DIAGNOSIS — N94.10 DYSPAREUNIA, FEMALE: Primary | ICD-10-CM

## 2023-04-14 PROCEDURE — 99214 OFFICE O/P EST MOD 30 MIN: CPT | Mod: VID | Performed by: FAMILY MEDICINE

## 2023-04-14 RX ORDER — ONDANSETRON 8 MG/1
8 TABLET, ORALLY DISINTEGRATING ORAL EVERY 8 HOURS PRN
Qty: 20 TABLET | Refills: 3 | Status: SHIPPED | OUTPATIENT
Start: 2023-04-14 | End: 2023-07-26

## 2023-04-14 RX ORDER — ATORVASTATIN CALCIUM 20 MG/1
20 TABLET, FILM COATED ORAL DAILY
Qty: 90 TABLET | Refills: 1 | Status: SHIPPED | OUTPATIENT
Start: 2023-04-14 | End: 2023-07-21

## 2023-04-14 ASSESSMENT — ANXIETY QUESTIONNAIRES
2. NOT BEING ABLE TO STOP OR CONTROL WORRYING: SEVERAL DAYS
7. FEELING AFRAID AS IF SOMETHING AWFUL MIGHT HAPPEN: SEVERAL DAYS
GAD7 TOTAL SCORE: 7
4. TROUBLE RELAXING: SEVERAL DAYS
8. IF YOU CHECKED OFF ANY PROBLEMS, HOW DIFFICULT HAVE THESE MADE IT FOR YOU TO DO YOUR WORK, TAKE CARE OF THINGS AT HOME, OR GET ALONG WITH OTHER PEOPLE?: SOMEWHAT DIFFICULT
5. BEING SO RESTLESS THAT IT IS HARD TO SIT STILL: SEVERAL DAYS
1. FEELING NERVOUS, ANXIOUS, OR ON EDGE: SEVERAL DAYS
IF YOU CHECKED OFF ANY PROBLEMS ON THIS QUESTIONNAIRE, HOW DIFFICULT HAVE THESE PROBLEMS MADE IT FOR YOU TO DO YOUR WORK, TAKE CARE OF THINGS AT HOME, OR GET ALONG WITH OTHER PEOPLE: SOMEWHAT DIFFICULT
6. BECOMING EASILY ANNOYED OR IRRITABLE: SEVERAL DAYS
3. WORRYING TOO MUCH ABOUT DIFFERENT THINGS: SEVERAL DAYS
GAD7 TOTAL SCORE: 7
GAD7 TOTAL SCORE: 7
7. FEELING AFRAID AS IF SOMETHING AWFUL MIGHT HAPPEN: SEVERAL DAYS

## 2023-04-14 ASSESSMENT — PATIENT HEALTH QUESTIONNAIRE - PHQ9
SUM OF ALL RESPONSES TO PHQ QUESTIONS 1-9: 7
10. IF YOU CHECKED OFF ANY PROBLEMS, HOW DIFFICULT HAVE THESE PROBLEMS MADE IT FOR YOU TO DO YOUR WORK, TAKE CARE OF THINGS AT HOME, OR GET ALONG WITH OTHER PEOPLE: SOMEWHAT DIFFICULT
SUM OF ALL RESPONSES TO PHQ QUESTIONS 1-9: 7
SUM OF ALL RESPONSES TO PHQ QUESTIONS 1-9: 7
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
## 2023-04-14 NOTE — PATIENT INSTRUCTIONS
" [facility]  [address] [place] [address]  Office: [phone]   Fax:    [phone]     Return in 7 weeks (on 6/5/2023) for cholesterol as a lab only appointment, then visit w/Dr. Payton 6 mos for video, then physical 12/2023 . . Future orders placed in epic for labs.     Please come in fasting :  nothing to eat for at least 8 , preferably 12 hours ahead of appointment. Please do come in well hydrated though - ok to  have water, black coffee or plain tea, BUT NO creamers or sweeteners of any kind, please.       Recheck 20 minute video visit in 6 months to recheck on mood, lipids, etc. ; schedule 40 minute annual physical with me for 12/16/2023 or after.             Thank you so much for doing a video visit with us today. And, again, thank you  for choosing our [facility] [place].  Please contact us with any questions that you may have.   We appreciate the opportunity to serve you now and look forward to supporting your healthcare needs for a long time to come!    Our clinic for the foreseeable future and until further notice , will continue to offer virtual visits, including telephone visits, video visits,  and e-visits, in addition to in person visits,  especially during this period of COVID-19 coronavirus pandemic.  Please call to schedule another one if you need it!        Most Sincerely,     Maribel Short MD                                              To reach your [facility] care team after hours call:   [phone] press #[phone] \"to speak with your care team\".  This will get you to our clinic instead of routing to central [facility]  scheduling.     PLEASE NOTE OUR HOURS HAVE CHANGED secondary to COVID-19 coronavirus pandemic, as we are trying to minimize patient exposure to the virus,  which is now widespread in the [facility].  These hours may change with very little notice.  We apologize for any inconvenience. "       Our current clinic hours are:         Monday- Thursday   7:00am - 6:00pm  in person.      Friday  7:00am- 5:00pm in person                       Saturday and Sunday : Closed to in person and virtual visits            We have telephone and virtual visit times available between 7:00am - 6pm on             Monday-Friday as well.                                                Phone:  [phone]    Our pharmacy hours: Monday  through Friday 8:00am to 5:00pm                        Saturday - 9:00 am to 12 noon         Sunday : Closed.     ###  Please note: at this time we are not accepting any walk-in visits. ###      There is also information available at our web site:  [email or web address]    If your provider ordered any lab tests and you do not receive the results within 10 business days, please call the clinic.    If you need a medication refill please contact your pharmacy.  Please allow 3 business days for your refill to be completed.    Our clinic offers telephone visits and e visits.  Please ask one of your team members to explain more.      Use [employer] (secure email communication and access to your chart) to send your primary care provider a message or make an appointment. Ask someone on your Team how to sign up for [employer].

## 2023-04-17 ENCOUNTER — TELEPHONE (OUTPATIENT)
Dept: FAMILY MEDICINE | Facility: CLINIC | Age: 53
End: 2023-04-17
Payer: COMMERCIAL

## 2023-04-17 DIAGNOSIS — N95.2 ATROPHIC VAGINITIS: ICD-10-CM

## 2023-04-17 DIAGNOSIS — N94.10 DYSPAREUNIA, FEMALE: ICD-10-CM

## 2023-04-17 NOTE — TELEPHONE ENCOUNTER
[facility]  compounding pharmacy calling as order needs clarification.     UNKNOWN MED DOSAGE 1 each 11 4/14/2023  --   Sig: ESTROGEN COMPOUNDED CREAM - estradiol 0.02% in HRT base compounded cream          Needs actual quantity as 1 means nothing to them as is compounded specifically per provider order. Something like XX grams of how much you want filled.     Order re-pended.     Thank you,  Selam Madrid R.N.

## 2023-04-26 ENCOUNTER — HOSPITAL ENCOUNTER (OUTPATIENT)
Dept: MAMMOGRAPHY | Facility: CLINIC | Age: 53
Discharge: HOME | End: 2023-04-26
Attending: FAMILY MEDICINE | Admitting: FAMILY MEDICINE
Payer: COMMERCIAL

## 2023-04-26 ENCOUNTER — LAB REQUISITION (OUTPATIENT)
Dept: LAB | Facility: CLINIC | Age: 53
End: 2023-04-26

## 2023-04-26 DIAGNOSIS — Z12.31 VISIT FOR SCREENING MAMMOGRAM: ICD-10-CM

## 2023-04-26 PROCEDURE — 86481 TB AG RESPONSE T-CELL SUSP: CPT | Performed by: INTERNAL MEDICINE

## 2023-04-26 PROCEDURE — 36415 COLL VENOUS BLD VENIPUNCTURE: CPT | Performed by: INTERNAL MEDICINE

## 2023-04-26 PROCEDURE — 77067 SCR MAMMO BI INCL CAD: CPT

## 2023-04-27 LAB
QUANTIFERON MITOGEN: 10 IU/ML
QUANTIFERON NIL TUBE: 0.04 IU/ML
QUANTIFERON TB1 TUBE: 0.05 IU/ML
QUANTIFERON TB2 TUBE: 0.05

## 2023-04-28 LAB
GAMMA INTERFERON BACKGROUND BLD IA-ACNC: 0.04 IU/ML
M TB IFN-G BLD-IMP: NEGATIVE
M TB IFN-G CD4+ BCKGRND COR BLD-ACNC: 9.96 IU/ML
MITOGEN IGNF BCKGRD COR BLD-ACNC: 0.01 IU/ML
MITOGEN IGNF BCKGRD COR BLD-ACNC: 0.01 IU/ML

## 2023-05-09 ENCOUNTER — PATIENT OUTREACH (OUTPATIENT)
Dept: ONCOLOGY | Facility: CLINIC | Age: 53
End: 2023-05-09
Payer: COMMERCIAL

## 2023-05-09 NOTE — PROGRESS NOTES
Writer received referral to Cancer Risk Management/Genetic Counseling.    Referred for: fam hx of breast cancer - PGM and 2 cousins with breast cancer     Patient was referred back in November of last year, but appears no response to our attempt to schedule.  Patient called our genetics team to schedule now.     Referral reviewed for appropriate plan, and sent to New Patient Scheduling for completion.    Maritza Chua, RN, BSN  Oncology New Patient Nurse Navigator   [facility]  [phone]

## 2023-05-11 ENCOUNTER — VIRTUAL VISIT (OUTPATIENT)
Dept: ONCOLOGY | Facility: CLINIC | Age: 53
End: 2023-05-11
Attending: FAMILY MEDICINE
Payer: COMMERCIAL

## 2023-05-11 DIAGNOSIS — Z80.0 FAMILY HISTORY OF MALIGNANT NEOPLASM OF PANCREAS: ICD-10-CM

## 2023-05-11 DIAGNOSIS — Z80.3 FAMILY HISTORY OF MALIGNANT NEOPLASM OF BREAST: Primary | ICD-10-CM

## 2023-05-11 DIAGNOSIS — Z80.8 FAMILY HISTORY OF MALIGNANT MELANOMA: ICD-10-CM

## 2023-05-11 PROCEDURE — 96040 HC GENETIC COUNSELING, EACH 30 MINUTES: CPT | Mod: 95 | Performed by: GENETIC COUNSELOR, MS

## 2023-05-11 NOTE — Clinical Note
5/11/2023         RE: Milagro Frye  [address] [address] [address]        Dear Colleague,    Thank you for referring your patient, Milagro Frye, to the [facility]. Please see a copy of my visit note below.    Virtual Visit Details    Type of service:  Telephone Visit   Phone call duration: *** minutes     5/11/2023    Referring Provider: ***    Presenting Information:   I met with Milagro for her video genetic counseling visit, through the Cancer Risk Management Program, to discuss her personal *** and family history of *** cancer. Today we reviewed this history, cancer screening recommendations, and available genetic testing options.    Personal History:  Milagro is a 52 year old year old female. She was diagnosed with ***; treatment included ***  / does not have any personal history of cancer.    ***She had her first menstrual period at age ***, her first child at age ***, and is ***.  ***Milagro has her ovaries, fallopian tubes and uterus in place, and she has had *** ovarian cancer screening to date. She reports that she *** hormone replacement therapy.      She has *** clinical breast exams and mammograms; her most recent mammogram in *** was ***. ***Milagro began having colonoscopies at the age of ***/Milagro has not had a colonoscopy. Her most recent colonoscopy in *** was *** and follow-up was recommended in ***. ***She does not regularly do any other cancer screening at this time. Milagro reported *** tobacco use and *** alcohol use.    Family History: (Please see scanned pedigree for detailed family history information)    ***    ***    Her maternal ethnicity is ***. Her paternal ethnicity is ***. There is no known Ashkenazi Faith ancestry on either side of her family. There is no reported consanguinity.    Discussion:    Milagro's personal and family history of *** is suggestive of a hereditary cancer syndrome.    We reviewed the features of  sporadic, familial, and hereditary cancers. ***    We discussed the natural history and genetics of hereditary cancer. A detailed handout regarding hereditary cancer, along with the other information we discussed, will be mailed to Milagro at the end of our appointment today and can be found in the after visit summary. Topics included: inheritance pattern, cancer risks, cancer screening recommendations, and also risks, benefits and limitations of testing.    Based on her personal and family history, Milagro meets current National Comprehensive Cancer Network (NCCN) criteria for genetic testing of ***.    We discussed that there are additional genes that could cause increased risk for *** cancer. As many of these genes present with overlapping features in a family and accurate cancer risk cannot always be established based upon the pedigree analysis alone, it would be reasonable for Milagro to consider panel genetic testing to analyze multiple genes at once.    ***    Milagro stated that she would prefer to submit a saliva kit for her genetic testing. ***[facility] will send a kit directly to her home with directions on how to collect a saliva sample. We discussed that there is a small chance for sample failure due to contamination of the sample. To help minimize this, she should follow the directions that are sent with the kit. Milagro verbalized understanding of this. Once the sample is collected, she will send it to ***[facility] using the return envelope and prepaid shipping label.     Verbal consent was given over video*** and written on the consent form. Turnaround time is approximately 4 weeks once the lab receives the sample.    Medical Management: For Milagro, we reviewed that the information from genetic testing may determine:    ***surgery to treat Milagro's active cancer diagnosis (i.e. lumpectomy versus bilateral mastectomy, partial versus total colectomy, etc.***),    additional cancer screening for which  Milagro may qualify (i.e. mammogram and breast MRI, more frequent colonoscopies, more frequent dermatologic exams, etc.***),    options for risk reducing surgeries Milagro could consider (i.e. bilateral mastectomy, surgery to remove her ovaries and/or uterus, etc.***),      and targeted chemotherapies for Milagro's *** active cancer, or ***if she were to develop certain cancers in the future (i.e. immunotherapy for individuals with Zhou syndrome, PARP inhibitors, etc.***).     These recommendations and possible targeted chemotherapies will be discussed in detail once genetic testing is completed.     Plan:  1) Today Milagro elected to proceed with ***.  2) A copy of the consent form and the after visit summary will be mailed to Milagro.  3) This information should be available in approximately 4 weeks, once the lab receives the sample.  4) I will call Milagro with the results once they become available.    Time spent on video: *** minutes    Horace Quiles MS, [facility]  Licensed, Certified Genetic Counselor    ***      Virtual Visit Details    Type of service:  Telephone Visit       Again, thank you for allowing me to participate in the care of your patient.        Sincerely,        Horace Quiles GC

## 2023-05-11 NOTE — NURSING NOTE
Is the patient currently in the state of [place]? YES    Visit mode:TELEPHONE    If the visit is dropped, the patient can be reconnected by: TELEPHONE VISIT: Phone number: [phone]    Will anyone else be joining the visit? NO      How would you like to obtain your AVS? MyChart    Are changes needed to the allergy or medication list? NO    Reason for visit: Telephone    DIAMOND ANTHONY

## 2023-05-11 NOTE — PROGRESS NOTES
2023    Referring Provider: Maribel Short MD    Presenting Information:   I met with Milagro for her telephone genetic counseling visit, through the Cancer Risk Management Program, to discuss her family history of melanoma, breast cancer, and pancreatic cancer. Today we reviewed this history, cancer screening recommendations, and available genetic testing options.    Personal History:  Milagro is a 52 year old year old female. She does not have any personal history of cancer. She had her first menstrual period at age 10, her first child at age 33, and is perimenopausal. Milagro has her ovaries, fallopian tubes and uterus in place, and she has had no ovarian cancer screening to date. She reports that she has not used hormone replacement therapy.      She has annual clinical breast exams and mammograms; her most recent mammogram in 2023 was normal. Milagro began having colonoscopies at the age of 52. Her most recent colonoscopy in 2023 was normal and follow-up was recommended in 10 years. She does not regularly do any other cancer screening at this time.     Family History: (Please see scanned pedigree for detailed family history information)    Milagro's father has a history of skin cancer that was thought to be melanoma. He is reported to have a history of a lot of sun exposure.    A paternal aunt was diagnosed with pancreatic cancer in her mid-80's and  at age 87.    Milagro's paternal grandmother was thought to be diagnosed with breast cancer in her late 40's and  at age 87.    A maternal uncle was diagnosed with lymphoma at an unknown age.    A maternal cousin was diagnosed with Hodgkin's lymphoma at age 21. She was later diagnosed with breast cancer at age 33 and had two recurrences before passing away at age 45.    Her sister, Milagro's cousin, was diagnosed with breast cancer in her 50's.    Her maternal ethnicity is English and Western . Her paternal ethnicity is  Kosovan, Western , and . There is no known Ashkenazi Buddhism ancestry on either side of her family. There is no reported consanguinity.    Discussion:    Milagro's family history of melanoma, breast cancer, and pancreatic cancer is suggestive of a hereditary cancer syndrome.    We reviewed the features of sporadic, familial, and hereditary cancers. We discussed that mutations in either BRCA1 or BRCA2 could be possible hereditary explanations for her family history of cancer. Mutations in the BRCA1 or BRCA2 gene are known to cause Hereditary Breast and Ovarian Cancer Syndrome (HBOC). HBOC typically presents with multiple family members diagnosed with breast cancer before age 50 and/or ovarian cancer. Other cancer risks associated with HBOC include male breast cancer, prostate cancer, pancreatic cancer, and melanoma.     We discussed the natural history and genetics of hereditary cancer. A detailed handout regarding hereditary cancer, along with the other information we discussed, will be mailed to Milagro at the end of our appointment today and can be found in the after visit summary. Topics included: inheritance pattern, cancer risks, cancer screening recommendations, and also risks, benefits and limitations of testing.    Based on her personal and family history, Milagro meets current National Comprehensive Cancer Network (NCCN) criteria for genetic testing of BRCA1/2 along with other high-penetrance breast cancer susceptibility genes (I.e. CDH1, PALB2, PTEN, and TP53).    We discussed that there are additional genes that could cause increased risk for melanoma, breast cancer, and/or pancreatic cancer. As many of these genes present with overlapping features in a family and accurate cancer risk cannot always be established based upon the pedigree analysis alone, it would be reasonable for Milagro to consider panel genetic testing to analyze multiple genes at once.    Genetic testing is  available for BRCA1/2 as part of the patient's core panel. This will then be automatically reflexed to a Custom Cancer panel through [facility].   Genetic testing is available for 51 genes associated with hereditary cancer: Custom Cancers panel (APC, DUC, AXIN2, BAP1, BARD1, BMPR1A, BRCA1, BRCA2, BRIP1, CDH1, CDK4, CDKN2A, CHEK2, CTNNA1, DICER1, EPCAM, GREM1, HOXB13, KIT, MEN1, MITF, MLH1, MSH2, MSH3, MSH6, MUTYH, NBN, NF1, NTHL1, PALB2, PDGFRA, PMS2, POLD1, POLE, POT1, PTEN, RAD50, RAD51C, RAD51D, RB1, SDHA, SDHB, SDHC, SDHD, SMAD4, SMARCA4, STK11, TP53, TSC1, TSC2, and VHL).   We discussed that many of the genes in the Custom Cancers panel are associated with specific hereditary cancer syndromes and published management guidelines: Hereditary Breast and Ovarian Cancer syndrome (BRCA1, BRCA2), Zhou syndrome (MLH1, MSH2, MSH6, PMS2, EPCAM), Familial Adenomatous Polyposis (APC), Hereditary Diffuse Gastric Cancer (CDH1), Familial Atypical Multiple Mole Melanoma syndrome (CDK4, CDKN2A), Juvenile Polyposis syndrome (BMPR1A, SMAD4), Cowden syndrome (PTEN), Li Fraumeni syndrome (TP53), Peutz-Jeghers syndrome (STK11), MUTYH Associated Polyposis (MUTYH), Tuberous Sclerosis complex (TSC1, TSC2), Neurofibromatosis type 1 (NF1), Multiple Endocrine Neoplasia type 1 (MEN1), Hereditary Paraganglioma and Pheochromocytoma (SDHA, SDHB, SDHC, SDHD), and von Hippel-Lindau (VHL).   The DUC, AXIN2, BRIP1, CHEK2, GREM1, MSH3, NBN, NTHL1, PALB2, POLD1, POLE, RAD51C, and RAD51D genes are associated with increased cancer risk and have published management guidelines for certain cancers.    The remaining genes (BAP1, BARD1, CTNNA1, DICER1, HOXB13, KIT, MITF, PDGFRA, POT1, RAD50, RB1, and SMARCA4) are associated with increased cancer risk and may allow us to make medical recommendations when mutations are identified.      Milagro would like to submit a blood sample for her genetic testing. She will go to her nearest [facility] at  her earliest convenience to get her blood drawn for her genetic testing.     Verbal consent was given over the phone and written on the consent form. Turnaround time is approximately 4 weeks once the lab receives the sample.    Medical Management: For Milagro, we reviewed that the information from genetic testing may determine:    additional cancer screening for which Milagro may qualify (i.e. mammogram and breast MRI, more frequent colonoscopies, more frequent dermatologic exams, etc.),    options for risk reducing surgeries Milagro could consider (i.e. bilateral mastectomy, surgery to remove her ovaries and/or uterus, etc.),      and targeted chemotherapies if she were to develop certain cancers in the future (i.e. immunotherapy for individuals with Zhou syndrome, PARP inhibitors, etc.).     These recommendations and possible targeted chemotherapies will be discussed in detail once genetic testing is completed.     Plan:  1) Today Milagro elected to proceed with BRCA1/2 testing with automatic reflex to a Custom Cancer panel through [facility].  2) A copy of the consent form and the after visit summary will be mailed to Milagro.  3) This information should be available in approximately 4 weeks, once the lab receives the sample.  4) I will call Milagro with the results once they become available.    Time spent on the phone: 52 minutes    Horace Quiles MS, [facility]  Licensed, Certified Genetic Counselor      Virtual Visit Details    Type of service:  Telephone Visit

## 2023-05-11 NOTE — LETTER
May 11, 2023    Milagro Frye  [address] [facility] [address]      Dear Milagro,    It was a pleasure speaking with you on the phone on 2023. Here is a copy of the progress note from our discussion. If you have any additional questions, please feel free to call.    Referring Provider: Maribel Short MD    Presenting Information:   I met with Milagro for her telephone genetic counseling visit, through the Cancer Risk Management Program, to discuss her family history of melanoma, breast cancer, and pancreatic cancer. Today we reviewed this history, cancer screening recommendations, and available genetic testing options.    Personal History:  Milagro is a 52 year old year old female. She does not have any personal history of cancer. She had her first menstrual period at age 10, her first child at age 33, and is perimenopausal. Milagro has her ovaries, fallopian tubes and uterus in place, and she has had no ovarian cancer screening to date. She reports that she has not used hormone replacement therapy.      She has annual clinical breast exams and mammograms; her most recent mammogram in 2023 was normal. Milagro began having colonoscopies at the age of 52. Her most recent colonoscopy in 2023 was normal and follow-up was recommended in 10 years. She does not regularly do any other cancer screening at this time.     Family History: (Please see scanned pedigree for detailed family history information)    Milagro's father has a history of skin cancer that was thought to be melanoma. He is reported to have a history of a lot of sun exposure.    A paternal aunt was diagnosed with pancreatic cancer in her mid-80's and  at age 87.    Milagro's paternal grandmother was thought to be diagnosed with breast cancer in her late 40's and  at age 87.    A maternal uncle was diagnosed with lymphoma at an unknown age.    A maternal cousin was diagnosed with Hodgkin's lymphoma at age 21.  She was later diagnosed with breast cancer at age 33 and had two recurrences before passing away at age 45.    Her sister, Milagro's cousin, was diagnosed with breast cancer in her 50's.      Her maternal ethnicity is English and Western . Her paternal ethnicity is [place]e, Western , and . There is no known Ashkenazi Confucianism ancestry on either side of her family. There is no reported consanguinity.    Discussion:    Milagro's family history of melanoma, breast cancer, and pancreatic cancer is suggestive of a hereditary cancer syndrome.    We reviewed the features of sporadic, familial, and hereditary cancers. We discussed that mutations in either BRCA1 or BRCA2 could be possible hereditary explanations for her family history of cancer. Mutations in the BRCA1 or BRCA2 gene are known to cause Hereditary Breast and Ovarian Cancer Syndrome (HBOC). HBOC typically presents with multiple family members diagnosed with breast cancer before age 50 and/or ovarian cancer. Other cancer risks associated with HBOC include male breast cancer, prostate cancer, pancreatic cancer, and melanoma.     We discussed the natural history and genetics of hereditary cancer. A detailed handout regarding hereditary cancer, along with the other information we discussed, will be mailed to Milagro at the end of our appointment today and can be found in the after visit summary. Topics included: inheritance pattern, cancer risks, cancer screening recommendations, and also risks, benefits and limitations of testing.    Based on her personal and family history, Milagro meets current National Comprehensive Cancer Network (NCCN) criteria for genetic testing of BRCA1/2 along with other high-penetrance breast cancer susceptibility genes (I.e. CDH1, PALB2, PTEN, and TP53).    We discussed that there are additional genes that could cause increased risk for melanoma, breast cancer, and/or pancreatic cancer. As many of these  genes present with overlapping features in a family and accurate cancer risk cannot always be established based upon the pedigree analysis alone, it would be reasonable for Milagro to consider panel genetic testing to analyze multiple genes at once.    Genetic testing is available for BRCA1/2 as part of the patient's core panel. This will then be automatically reflexed to a Custom Cancer panel through [facility].     Genetic testing is available for 51 genes associated with hereditary cancer: Custom Cancers panel (APC, DUC, AXIN2, BAP1, BARD1, BMPR1A, BRCA1, BRCA2, BRIP1, CDH1, CDK4, CDKN2A, CHEK2, CTNNA1, DICER1, EPCAM, GREM1, HOXB13, KIT, MEN1, MITF, MLH1, MSH2, MSH3, MSH6, MUTYH, NBN, NF1, NTHL1, PALB2, PDGFRA, PMS2, POLD1, POLE, POT1, PTEN, RAD50, RAD51C, RAD51D, RB1, SDHA, SDHB, SDHC, SDHD, SMAD4, SMARCA4, STK11, TP53, TSC1, TSC2, and VHL).     We discussed that many of the genes in the Custom Cancers panel are associated with specific hereditary cancer syndromes and published management guidelines: Hereditary Breast and Ovarian Cancer syndrome (BRCA1, BRCA2), Zhou syndrome (MLH1, MSH2, MSH6, PMS2, EPCAM), Familial Adenomatous Polyposis (APC), Hereditary Diffuse Gastric Cancer (CDH1), Familial Atypical Multiple Mole Melanoma syndrome (CDK4, CDKN2A), Juvenile Polyposis syndrome (BMPR1A, SMAD4), Cowden syndrome (PTEN), Li Fraumeni syndrome (TP53), Peutz-Jeghers syndrome (STK11), MUTYH Associated Polyposis (MUTYH), Tuberous Sclerosis complex (TSC1, TSC2), Neurofibromatosis type 1 (NF1), Multiple Endocrine Neoplasia type 1 (MEN1), Hereditary Paraganglioma and Pheochromocytoma (SDHA, SDHB, SDHC, SDHD), and von Hippel-Lindau (VHL).     The DUC, AXIN2, BRIP1, CHEK2, GREM1, MSH3, NBN, NTHL1, PALB2, POLD1, POLE, RAD51C, and RAD51D genes are associated with increased cancer risk and have published management guidelines for certain cancers.      The remaining genes (BAP1, BARD1, CTNNA1, DICER1, HOXB13, KIT, MITF, PDGFRA,  POT1, RAD50, RB1, and SMARCA4) are associated with increased cancer risk and may allow us to make medical recommendations when mutations are identified.      Milagro would like to submit a blood sample for her genetic testing. She will go to her [facility] at her earliest convenience to get her blood drawn for her genetic testing.       Verbal consent was given over the phone and written on the consent form. Turnaround time is approximately 4 weeks once the lab receives the sample.    Medical Management: For Milagro, we reviewed that the information from genetic testing may determine:    additional cancer screening for which Milagro may qualify (i.e. mammogram and breast MRI, more frequent colonoscopies, more frequent dermatologic exams, etc.),    options for risk reducing surgeries Milagro could consider (i.e. bilateral mastectomy, surgery to remove her ovaries and/or uterus, etc.),      and targeted chemotherapies if she were to develop certain cancers in the future (i.e. immunotherapy for individuals with Zhou syndrome, PARP inhibitors, etc.).     These recommendations and possible targeted chemotherapies will be discussed in detail once genetic testing is completed.     Plan:  1) Today Milagro elected to proceed with BRCA1/2 testing with automatic reflex to a Custom Cancer panel through [facility].  2) A copy of the consent form and the after visit summary will be mailed to Milagro.  3) This information should be available in approximately 4 weeks, once the lab receives the sample.  4) I will call Milagro with the results once they become available.    Time spent on the phone: 52 minutes    Horace Quiles MS, [facility] [place]  Licensed, Certified Genetic Counselor

## 2023-05-15 ENCOUNTER — LAB (OUTPATIENT)
Dept: LAB | Facility: CLINIC | Age: 53
End: 2023-05-15
Payer: COMMERCIAL

## 2023-05-15 DIAGNOSIS — Z80.8 FAMILY HISTORY OF MALIGNANT MELANOMA: ICD-10-CM

## 2023-05-15 DIAGNOSIS — Z80.3 FAMILY HISTORY OF MALIGNANT NEOPLASM OF BREAST: ICD-10-CM

## 2023-05-15 DIAGNOSIS — Z80.0 FAMILY HISTORY OF MALIGNANT NEOPLASM OF PANCREAS: ICD-10-CM

## 2023-05-15 PROCEDURE — 36415 COLL VENOUS BLD VENIPUNCTURE: CPT

## 2023-05-15 PROCEDURE — 99000 SPECIMEN HANDLING OFFICE-LAB: CPT

## 2023-05-23 ENCOUNTER — TRANSFERRED RECORDS (OUTPATIENT)
Dept: HEALTH INFORMATION MANAGEMENT | Facility: CLINIC | Age: 53
End: 2023-05-23
Payer: COMMERCIAL

## 2023-06-05 ENCOUNTER — TRANSFERRED RECORDS (OUTPATIENT)
Dept: HEALTH INFORMATION MANAGEMENT | Facility: CLINIC | Age: 53
End: 2023-06-05
Payer: COMMERCIAL

## 2023-06-21 ENCOUNTER — VIRTUAL VISIT (OUTPATIENT)
Dept: ONCOLOGY | Facility: CLINIC | Age: 53
End: 2023-06-21
Attending: GENETIC COUNSELOR, MS
Payer: COMMERCIAL

## 2023-06-21 DIAGNOSIS — Z80.0 FAMILY HISTORY OF MALIGNANT NEOPLASM OF PANCREAS: ICD-10-CM

## 2023-06-21 DIAGNOSIS — Z80.3 FAMILY HISTORY OF MALIGNANT NEOPLASM OF BREAST: Primary | ICD-10-CM

## 2023-06-21 DIAGNOSIS — Z80.8 FAMILY HISTORY OF MALIGNANT MELANOMA: ICD-10-CM

## 2023-06-21 PROCEDURE — 96040 HC GENETIC COUNSELING, EACH 30 MINUTES: CPT | Mod: TEL,95 | Performed by: GENETIC COUNSELOR, MS

## 2023-06-21 NOTE — Clinical Note
"    6/21/2023         RE: Milagro Frye  [address] [facility] [address]        Dear Colleague,    Thank you for referring your patient, Milagro Frye, to the [facility]. Please see a copy of my visit note below.    6/21/2023    Referring Provider: ***    Presenting Information:  I spoke to Milagro by phone today to discuss her genetic testing results. Her blood was drawn on ***. *** test was ordered from ***[facility]. This testing was done because of Milagro's personal and family history of ***.    Genetic Testing Result: Multiple Variants of Uncertain Significance (VUS)  Milagro was found to have 2 variants of uncertain significance (VUS).      MSH2: ***     MUTYH: ***    No other variants or mutations were found in these genes. Given the uncertain significance of this result, medical management decisions should NOT be made based on this test result alone.    Of note, Milagro tested negative for mutations in the following genes by sequencing and deletion/duplication analysis: ***. We reviewed the autosomal dominant inheritance of these genes. Milagro cannot pass on a mutation in any of these genes to her children based on this test result. Mutations in these genes do not skip generations.      ***A copy of the test report can be found in the Laboratory tab, dated ***, and named \"LABORATORY MISCELLANEOUS ORDER\". The report is scanned in as a linked document.    ***A copy of the test report can be found in the Laboratory tab, dated ***, and named \"Next generation sequencing\".     Interpretation:  We discussed several different interpretations of this inconclusive test result. It is not clear if any of these variants are associated with increased cancer risk. These variants may be benign changes that do not increased cancer risk, or they may be harmful mutations that cause increased risk for certain cancers.    ***Variant 1  ***Insert condition info***    ***In rare " situations in which both parents have a harmful mutation in the *** gene, their children each have a 25% risk for ***[insert condition name and info]. If this variant is later classified as harmful, Milagro would be considered a carrier for ***. In that case, genetic counseling and genetic testing may be advised for her partner.    ***Variant 2  ***Insert condition info***    ***In rare situations in which both parents have a harmful mutation in the *** gene, their children each have a 25% risk for ***[insert condition name and info]. If this variant is later classified as harmful, Milagro would be considered a carrier for ***. In that case, genetic counseling and genetic testing may be advised for her partner.    The laboratory is working to determine if any of these variants are harmful or benign, and they will contact me if any of them are reclassified. If these variants are determined to be benign, there may be a different gene or combination of genes and environment that are associated with the cancers in this family.    It is also important to consider that her *** may have had a mutation in one of the genes tested and she did not inherit it.  ***    Inheritance:  We reviewed the autosomal dominant inheritance of the variants in the *** and *** genes.  We discussed that Milagro has a 50% chance to pass each of these variants to each of her children. Likewise, there is a 50% chance for each of these variants to be present in each of her siblings. Because it is unclear what, if any, risk is associated with these variants, clinical genetic testing for these *** variants alone is not recommended for relatives.    Family Studies:  The laboratory may offer additional research based testing for specific relatives in order to help reclassify these variants.    Screening:  Based on this inconclusive test result, it is important for Milagro and her relatives to refer back to the family history for appropriate cancer  screening.      ***      Additional Testing Considerations:  ***It is possible Milagro does carry a currently identifiable gene or combination of genes and environment that increase her risk for *** cancer. We again reviewed the option of larger gene panels covering more moderate risk genes associated with *** cancer. Milagro is encouraged to contact me if she wishes to readdress these larger gene panel options.    ***Although Milagro's genetic testing result is inconclusive, other relatives may still carry a harmful gene mutation associated with *** cancer. Genetic counseling is recommended for *** to discuss genetic testing options. *** If any of these relatives do pursue genetic testing, Milagro is encouraged to contact me so that we may review the impact of their test results on her    Summary:  We do not have an explanation for Milagro's *** cancer. While no genetic changes were identified, Milagro may still be at risk for certain cancers due to family history, environmental factors, or other genetic causes not identified by this test.  Because of that, it is important that she continue with cancer screening based on her personal and family history as discussed above.    Genetic testing is rapidly advancing, and new cancer susceptibility genes will most likely be identified in the future.  Therefore, I encouraged Milagro to contact me annually or if there are changes in her personal or family history.  This may change how we assess her cancer risk, screening, and the testing we would offer.    Plan:  1.  I provided Milagro with a copy of her test results today.    2. She plans to follow-up with ***.  3. She should contact me regularly, or sooner if her family history changes.  4. I will contact Milagro if the laboratory informs me that these variants have been reclassified.  This may change screening and testing recommendations for Milagro and her relatives.    If Milagro has any further questions, I encouraged  her to contact me via [facility].    Time spent on the phone: 19 minutes    Horace Quiles MS, [facility] [place]  Licensed, Certified Genetic Counselor    ***    Virtual Visit Details    Type of service:  Telephone Visit       Again, thank you for allowing me to participate in the care of your patient.        Sincerely,        Horace Quiles, GC

## 2023-06-21 NOTE — LETTER
June 21, 2023    Milagro Frye  [address] [facility] [address]      Dear Milagro,    It was a pleasure speaking with you on the phone on 6/21/2023. Here is a copy of the progress note from our discussion. If you have any additional questions, please feel free to call.    Referring Provider: Maribel Short MD    Presenting Information:  I spoke to Milagro by phone today to discuss her genetic testing results. Her blood was drawn on 5/15/23. A Custom Cancer panel was ordered from [facility]. This testing was done because of Milagro's family history of melanoma, breast cancer, and pancreatic cancer.    Genetic Testing Result: Multiple Variants of Uncertain Significance (VUS)  Milagro was found to have 2 variants of uncertain significance (VUS).      MSH2: c.260C>G (p.Gsm23Lgv)     MUTYH: c.925C>T (p.Nyr706Ral)    No other variants or mutations were found in these genes. Given the uncertain significance of this result, medical management decisions should NOT be made based on this test result alone.    Of note, Milagro is negative for mutations in APC, DUC, AXIN2, BAP1, BARD1, BMPR1A, BRCA1, BRCA2, BRIP1, CDH1, CDK4, CDKN2A, CHEK2, CTNNA1, DICER1, EPCAM, GREM1, HOXB13, KIT, MEN1, MITF, MLH1, MSH3, MSH6, NBN, NF1, NTHL1, PALB2, PDGFRA, PMS2, POLD1, POLE, POT1, PTEN, RAD50, RAD51C, RAD51D, RB1, SDHA, SDHB, SDHC, SDHD, SMAD4, SMARCA4, STK11, TP53, TSC1, TSC2, and VHL. No mutations were found in any of the other 49 genes analyzed. This test involved sequencing and deletion/duplication analysis of all genes with the exceptions of EPCAM and GREM1 (deletions/duplications only), MITF (only the status of the c.952G>A (p.E318K) alteration is analyzed and reported), and SDHA (sequencing only). We reviewed the autosomal dominant inheritance of these genes. Milagro cannot pass on a mutation in any of these genes to her children based on this test result. Mutations in these genes do not skip generations.      A  "copy of the test report can be found in the Laboratory tab, dated 5/15/23, and named \"LABORATORY MISCELLANEOUS ORDER\". The report is scanned in as a linked document.    Interpretation:  We discussed several different interpretations of this inconclusive test result. It is not clear if any of these variants are associated with increased cancer risk. These variants may be benign changes that do not increased cancer risk, or they may be harmful mutations that cause increased risk for certain cancers.    MSH2 c.260C>G (p.Jgv15Ldk)  Harmful mutations in the MSH2 gene cause Zhou syndrome. Individuals with Zhou syndrome have an increased risk for colon, uterine, ovarian, prostate, pancreatic, stomach, bladder, and other cancers. In rare situations in which both parents have a mutation in the MSH2 gene, their children each have a 25% risk for constitutional mismatch repair deficiency syndrome (CMMRD).  CMMRD is a disorder that causes cafe-au-lait spots and risk for childhood cancers. We discussed that medical management decisions are NOT recommended for individuals with a VUS result.     MUTYH c.925C>T (p.[id number])  We reviewed that if this were a changed to harmful mutation, Milagro would be considered a carrier (one MUTYH mutation) for autosomal recessive MUTYH-associated polyposis (MAP) and would NOT be expected to be at increased risk for colonic polyps, colon cancer, and/or duodenal cancer. Some studies suggest that there may be an increased risk for colon cancer for carriers of MAP. Individuals with MAP (who have two MUTYH mutations) typically have an increased number of colonic polyps and an increased risk for colorectal and duodenal cancer. We discussed that medical management decisions are NOT recommended for individuals with a VUS result.    The laboratory is working to determine if any of these variants are harmful or benign, and they will contact me if any of them are reclassified. If these variants are " determined to be benign, there may be a different gene or combination of genes and environment that are associated with the cancers in this family.    It is also important to consider that her paternal aunt may have had a mutation in one of the genes tested and she did not inherit it.     Inheritance:  We reviewed the autosomal dominant inheritance of the variants in the MSH2 and MUTYH genes.  We discussed that Milagro has a 50% chance to pass each of these variants to each of her children. Likewise, there is a 50% chance for each of these variants to be present in each of her siblings. Because it is unclear what, if any, risk is associated with these variants, clinical genetic testing for these MSH2 and MUTYH variants alone is not recommended for relatives.    Screening:  Based on this inconclusive test result, it is important for Milagro and her relatives to refer back to the family history for appropriate cancer screening.      Due to the close family history of melanoma, Milagro remains at some increased risk for developing melanoma. According to the American Cancer Society, Milagro is encouraged to speak to her primary care provider about having regular skin exams and safe skin practices (i.e. sunscreen, self skin exams, limited sun exposure, etc.).    Based on her personal and family history, Milagro has a 23.9% lifetime risk of developing breast cancer based on the GUCCI model. As such, Milagro meets current National Comprehensive Cancer Network (NCCN) guidelines for high risk breast screening. This includes annual breast MRI in addition to annual mammogram. In addition, Milagro should be receiving clinical breast exams by her physician. We discussed that Milagro could participate in our Cancer Risk Management Program in which our nursing specialist provides an individual screening plan and assists with medical management. A referral was made to see [facility] for this service.    Other population  cancer screening options, such as those recommended by the American Cancer Society and the National Comprehensive Cancer Network (NCCN), are also appropriate for Milagro and her family. These screening recommendations may change if there are changes to Milagro's personal and/or family history of cancer. Final screening recommendations should be made by each individual's primary care provider.      Additional Testing Considerations:  Although Milagro's genetic testing result is inconclusive, other relatives may still carry a harmful gene mutation associated with melanoma, breast cancer, and/or pancreatic cancer. Genetic counseling is recommended for her father and other close maternal relatives to discuss genetic testing options. If any of these relatives do pursue genetic testing, Milagro is encouraged to contact me so that we may review the impact of their test results on her    Summary:  We do not have an explanation for Milagro's family history of cancer. While no genetic changes were identified, Milagro may still be at risk for certain cancers due to family history, environmental factors, or other genetic causes not identified by this test.  Because of that, it is important that she continue with cancer screening based on her personal and family history as discussed above.    Genetic testing is rapidly advancing, and new cancer susceptibility genes will most likely be identified in the future.  Therefore, I encouraged Milagro to contact me annually or if there are changes in her personal or family history.  This may change how we assess her cancer risk, screening, and the testing we would offer.    Plan:  1.  I provided Milagro with a copy of her test results today.    2. She plans to follow-up with her other providers.  3. She should contact me regularly, or sooner if her family history changes.  4. I will contact Milagro if the laboratory informs me that these variants have been reclassified.  This may change  screening and testing recommendations for Milagro and her relatives.    If Milagro has any further questions, I encouraged her to contact me via [facility].    Time spent on the phone: 19 minutes    Horace Quiles MS, [facility] [place]  Licensed, Certified Genetic Counselor

## 2023-06-21 NOTE — PROGRESS NOTES
"6/21/2023    Referring Provider: Maribel Short MD    Presenting Information:  I spoke to Milagro by phone today to discuss her genetic testing results. Her blood was drawn on 5/15/23. A Custom Cancer panel was ordered from [facility]. This testing was done because of Milagro's family history of melanoma, breast cancer, and pancreatic cancer.    Genetic Testing Result: Multiple Variants of Uncertain Significance (VUS)  Milagro was found to have 2 variants of uncertain significance (VUS).      MSH2: c.260C>G (p.Lrh42Apc)     MUTYH: c.925C>T (p.Btq222Oso)    No other variants or mutations were found in these genes. Given the uncertain significance of this result, medical management decisions should NOT be made based on this test result alone.    Of note, Milagro is negative for mutations in APC, DUC, AXIN2, BAP1, BARD1, BMPR1A, BRCA1, BRCA2, BRIP1, CDH1, CDK4, CDKN2A, CHEK2, CTNNA1, DICER1, EPCAM, GREM1, HOXB13, KIT, MEN1, MITF, MLH1, MSH3, MSH6, NBN, NF1, NTHL1, PALB2, PDGFRA, PMS2, POLD1, POLE, POT1, PTEN, RAD50, RAD51C, RAD51D, RB1, SDHA, SDHB, SDHC, SDHD, SMAD4, SMARCA4, STK11, TP53, TSC1, TSC2, and VHL. No mutations were found in any of the other 49 genes analyzed. This test involved sequencing and deletion/duplication analysis of all genes with the exceptions of EPCAM and GREM1 (deletions/duplications only), MITF (only the status of the c.952G>A (p.E318K) alteration is analyzed and reported), and SDHA (sequencing only). We reviewed the autosomal dominant inheritance of these genes. Milagro cannot pass on a mutation in any of these genes to her children based on this test result. Mutations in these genes do not skip generations.      A copy of the test report can be found in the Laboratory tab, dated 5/15/23, and named \"LABORATORY MISCELLANEOUS ORDER\". The report is scanned in as a linked document.    Interpretation:  We discussed several different interpretations of this inconclusive test result. It is not " clear if any of these variants are associated with increased cancer risk. These variants may be benign changes that do not increased cancer risk, or they may be harmful mutations that cause increased risk for certain cancers.    MSH2 c.260C>G (p.Acz86Knj)  Harmful mutations in the MSH2 gene cause Zhou syndrome. Individuals with Zhou syndrome have an increased risk for colon, uterine, ovarian, prostate, pancreatic, stomach, bladder, and other cancers. In rare situations in which both parents have a mutation in the MSH2 gene, their children each have a 25% risk for constitutional mismatch repair deficiency syndrome (CMMRD).  CMMRD is a disorder that causes cafe-au-lait spots and risk for childhood cancers. We discussed that medical management decisions are NOT recommended for individuals with a VUS result.     MUTYH c.925C>T (p.Nla478Zeu)  We reviewed that if this were a changed to harmful mutation, Milagro would be considered a carrier (one MUTYH mutation) for autosomal recessive MUTYH-associated polyposis (MAP) and would NOT be expected to be at increased risk for colonic polyps, colon cancer, and/or duodenal cancer. Some studies suggest that there may be an increased risk for colon cancer for carriers of MAP. Individuals with MAP (who have two MUTYH mutations) typically have an increased number of colonic polyps and an increased risk for colorectal and duodenal cancer. We discussed that medical management decisions are NOT recommended for individuals with a VUS result.    The laboratory is working to determine if any of these variants are harmful or benign, and they will contact me if any of them are reclassified. If these variants are determined to be benign, there may be a different gene or combination of genes and environment that are associated with the cancers in this family.    It is also important to consider that her paternal aunt may have had a mutation in one of the genes tested and she did not inherit  it.     Inheritance:  We reviewed the autosomal dominant inheritance of the variants in the MSH2 and MUTYH genes.  We discussed that Milagro has a 50% chance to pass each of these variants to each of her children. Likewise, there is a 50% chance for each of these variants to be present in each of her siblings. Because it is unclear what, if any, risk is associated with these variants, clinical genetic testing for these MSH2 and MUTYH variants alone is not recommended for relatives.    Screening:  Based on this inconclusive test result, it is important for Milagro and her relatives to refer back to the family history for appropriate cancer screening.      Due to the close family history of melanoma, Milagro remains at some increased risk for developing melanoma. According to the American Cancer Society, Milagro is encouraged to speak to her primary care provider about having regular skin exams and safe skin practices (i.e. sunscreen, self skin exams, limited sun exposure, etc.).    Based on her personal and family history, Milagro has a 23.9% lifetime risk of developing breast cancer based on the GUCCI model. As such, Milagro meets current National Comprehensive Cancer Network (NCCN) guidelines for high risk breast screening. This includes annual breast MRI in addition to annual mammogram. In addition, Milagro should be receiving clinical breast exams by her physician. We discussed that Milagro could participate in our Cancer Risk Management Program in which our nursing specialist provides an individual screening plan and assists with medical management. A referral was made to see [facility] for this service.    Other population cancer screening options, such as those recommended by the American Cancer Society and the National Comprehensive Cancer Network (NCCN), are also appropriate for Milagro and her family. These screening recommendations may change if there are changes to Milagro's personal and/or  family history of cancer. Final screening recommendations should be made by each individual's primary care provider.      Additional Testing Considerations:  Although Milagro's genetic testing result is inconclusive, other relatives may still carry a harmful gene mutation associated with melanoma, breast cancer, and/or pancreatic cancer. Genetic counseling is recommended for her father and other close maternal relatives to discuss genetic testing options. If any of these relatives do pursue genetic testing, Milagro is encouraged to contact me so that we may review the impact of their test results on her    Summary:  We do not have an explanation for Milagro's family history of cancer. While no genetic changes were identified, Milagro may still be at risk for certain cancers due to family history, environmental factors, or other genetic causes not identified by this test.  Because of that, it is important that she continue with cancer screening based on her personal and family history as discussed above.    Genetic testing is rapidly advancing, and new cancer susceptibility genes will most likely be identified in the future.  Therefore, I encouraged Milagro to contact me annually or if there are changes in her personal or family history.  This may change how we assess her cancer risk, screening, and the testing we would offer.    Plan:  1.  I provided Milagro with a copy of her test results today.    2. She plans to follow-up with her other providers.  3. She should contact me regularly, or sooner if her family history changes.  4. I will contact Milagro if the laboratory informs me that these variants have been reclassified.  This may change screening and testing recommendations for Milagro and her relatives.    If Milagro has any further questions, I encouraged her to contact me via [employer].    Time spent on the phone: 19 minutes    Horace Quiles MS, [facility] [place]  Licensed, Certified Genetic Counselor      Virtual Visit  Details    Type of service:  Telephone Visit

## 2023-06-22 ENCOUNTER — PATIENT OUTREACH (OUTPATIENT)
Dept: ONCOLOGY | Facility: CLINIC | Age: 53
End: 2023-06-22
Payer: COMMERCIAL

## 2023-06-22 NOTE — PROGRESS NOTES
Writer received Cancer Risk Management Program referral, referred for:     please schedule with [facility] for high risk breast screening (GUCCI 23.9%)     Reviewed for appropriate plan, and sent to New Patient Scheduling for completion.

## 2023-07-13 NOTE — PATIENT INSTRUCTIONS
Assessing Cancer Risk  Cancer is a common diagnosis which impacts many families.  Individuals may develop cancer due to environmental factors (such as exposures and lifestyle), aging, genetic predisposition, or a combination of these factors.      Only about 5-10% of cancers are thought to be due to an inherited cancer susceptibility gene.    These families often have:  Several people with the same or related types of cancer  Cancers diagnosed at a young age (before age 50)  Individuals with more than one primary cancer  Multiple generations of the family affected with cancer    Comprehensive Breast and Gynecologic Cancer Panel  We each inherit two copies of every gene in our bodies: one from our mother, and one from our father. Each gene has a specific job to do.  When a gene has a mistake or  mutation  in it, it does not work like it should.     Some people may be candidates for genetic testing of more than one gene.  For these families, genetic testing using a cancer panel may be offered. These panels will test different genes at once known to increase the risk for breast, ovarian, uterine, and/or other cancers.    This handout will review common hereditary breast and gynecologic cancer syndromes. The genes that will be discussed in this handout are: DUC, BRCA1, BRCA2, BRIP1, CDH1, CHEK2, MLH1, MSH2, MSH6, PMS2, EPCAM, PTEN, PALB2, RAD51C, RAD51D, and TP53.    The purpose of this handout is to serve as a brief summary of the breast and gynecologic cancer risk genes that have published clinical management guidelines for individuals who are found to carry a mutation. Inheriting a mutation does not mean a person will develop cancer, but it does significantly increase their risk above the general population risk.     ______________________________________________________________________________    Hereditary Breast and Ovarian Cancer Syndrome (BRCA1 and BRCA2)  A single mutation in one of the copies of BRCA1 or  BRCA2 increases the risk for breast and ovarian cancer, among others.  The risk for pancreatic cancer and melanoma may also be slightly increased in some families.  The chart below shows the chance that someone with a BRCA mutation would develop cancer in his or her lifetime1,2,3,4.       Lifetime Cancer Risks    General Population BRCA1  BRCA2   Breast  12% >60% >60%   Ovarian  1-2% 39-58% 13-29%   Prostate 12% 7-26% 19-61%   Male Breast 0.1% 0.2-1.2% 1.8-7.1%   Pancreas 1-2% Up to 5% 5-10%     A person s ethnic background is also important to consider, as individuals of Ashkenazi Islam ancestry have a higher chance of having a BRCA gene mutation.  There are three BRCA mutations that occur more frequently in this population.      Zhou Syndrome (MLH1, MSH2, MSH6, PMS2, and EPCAM)  Currently five genes are known to cause Zhou Syndrome: MLH1, MSH2, MSH6, PMS2, and EPCAM.  A single mutation in one of the Zhou Syndrome genes increases the risk for colon, endometrial, ovarian, and stomach cancers.  Other cancers that occur less commonly in Zhou Syndrome include urinary tract, skin, and brain cancers.  The chart below shows the chance that a person with Zhou syndrome would develop cancer in his or her lifetime5.      Lifetime Cancer Risks    General Population Zhou Syndrome   Colon 5% 10-61%   Endometrial 3% 13-57%   Ovarian 1-2% 1-38%   Stomach <1% 1-9%   *Cancer risk varies depending on Zhou syndrome gene found      Cowden Syndrome (PTEN)  Cowden syndrome is a hereditary condition that increases the risk for breast, thyroid, endometrial, colon, and kidney cancer.  Cowden syndrome is caused by a mutation in the PTEN gene.  A single mutation in one of the copies of PTEN causes Cowden syndrome and increases cancer risk.  The chart below shows the chance that someone with a PTEN mutation would develop cancer in their lifetime6,7.  Other benign features seen in some individuals with Cowden syndrome include benign  skin lesions (facial papules, keratoses, lipomas), learning disability, autism, thyroid nodules, colon polyps, and larger head size.     Lifetime Cancer Risks    General Population Cowden   Breast 12% 40-60%*   Thyroid 1% Up to 38%   Renal 1-2% Up to 35%   Endometrial 3% Up to 28%   Colon 5% Up to 9%   Melanoma 2-3% Up to 6%   *Emerging data suggests the risk for breast cancer could be greater than 60%               Li-Fraumeni Syndrome (TP53)  Li-Fraumeni Syndrome (LFS) is a cancer predisposition syndrome caused by a mutation in the TP53 gene. A single mutation in one of the copies of TP53 increases the risk for multiple cancers. Individuals with LFS are at an increased risk for developing cancer at a young age. The lifetime risk for development of a LFS-associated cancer is 50% by age 30 and 90% by age 60.   Core Cancers: Sarcomas, Breast, Brain, Lung, Leukemias/Lymphomas, Adrenocortical carcinomas  Other Cancers: Gastrointestinal, Thyroid, Skin, Genitourinary       Hereditary Diffuse Gastric Cancer (CDH1)  Currently, one gene is known to cause hereditary diffuse gastric cancer (HDGC): CDH1.  Individuals with HDGC are at increased risk for diffuse gastric cancer and lobular breast cancer. Of people diagnosed with HDGC, 30-50% have a mutation in the CDH1 gene.  This suggests there are likely other genes that may cause HDGC that have not been identified yet.      Lifetime Cancer Risks    General Population HDGC   Diffuse Gastric  <1% ~80%   Breast 12% 41-60%       Additional Genes    DUC  DUC is a moderate-risk breast cancer gene. Women who have a mutation in DUC can have between a 2-4 fold increased risk for breast cancer compared to the general population8. DUC mutations have also been associated with increased risk for pancreatic cancer between 5-10%9. Individuals who inherit two DUC mutations have a condition called ataxia-telangiectasia (AT).  This rare autosomal recessive condition affects the nervous system  and immune system, and is associated with progressive cerebellar ataxia beginning in childhood. Individuals with ataxia-telangiectasia often have a weakened immune system and have an increased risk for childhood cancers.    PALB2  Mutations in PALB2 have been shown to increase the risk of breast cancer up to 41-60% in some families; where individuals fall within this risk range is dependent upon family [id number]. PALB2 mutations have also been associated with increased risk for pancreatic cancer between 5-10%.  Individuals who inherit two PALB2 mutations--one from their mother and one from their father--have a condition called Fanconi Anemia.  This rare autosomal recessive condition is associated with short stature, developmental delay, bone marrow failure, and increased risk for childhood cancers.    CHEK2   CHEK2 is a moderate-risk breast cancer gene.  Women who have a mutation in CHEK2 have around a 2-4 fold increased risk for breast cancer compared to the general population, and this risk may be higher depending upon family history.11,12,13 The risk of colon cancer may be twice as high as the general population risk of colon cancer of 5%. Mutations in CHEK2 have also been shown to increase the risk of other cancers, including prostate, however these cancer risks are currently not well understood.    BRIP1, RAD51C and RAD51D  Mutations in RAD51C and RAD51D have been shown to increase the risk of ovarian cancer and breast cancer 14,. Mutations in BRIP1 have been shown to increase the risk of ovarian cancer and possibly female breast cancer 15 .       Lifetime Cancer Risk    General Population        BRIP1   RAD51C  RAD51D   Breast 12% Not well defined 20-40% 20-40%   Ovarian 1-2% 5-15% 10-15% 10-20%     ______________________________________________________________  Inheritance  All of the cancer syndromes reviewed above are inherited in an autosomal dominant pattern.  This means that if a parent has a mutation,  each of their children will have a 50% chance of inheriting that same mutation. Therefore, each child --male or female-- would have a 50% chance of being at increased risk for developing cancer.    Image obtained from Genetics Home Reference, 2013     Mutations in some genes can occur de belle, which means that a person s mutation occurred for the first time in them and was not inherited from a parent.  Now that they have the mutation, however, it can be passed on to future generations.    Genetic Testing  Genetic testing involves a blood test and will look for any harmful mutations that are associated with increased cancer risk.  If possible, it is recommended that the person(s) who has had cancer be tested before other family members.  That person will give us the most useful information about whether or not a specific gene is associated with the cancer in the family.    Results  There are three possible results of genetic testing:  Positive--a harmful mutation was identified in one or more of the genes  Negative--no mutations were identified in any of the genes tested  Variant of unknown significance--a variation in one of the genes was identified, but it is unclear how this impacts cancer risk in the family    Advantages and Disadvantages   There are advantages and disadvantages to genetic testing.    Advantages  May clarify your cancer risk  Can help you make medical decisions  May explain the cancers in your family  May give useful information to your family members (if you share your results)    Disadvantages  Possible negative emotional impact of learning about inherited cancer risk  Uncertainty in interpreting a negative test result in some situations  Possible genetic discrimination concerns (see below)    Genetic Information Nondiscrimination Act (STUART)  The Genetic Information Nondiscrimination Act of 2008 (STUART) is a federal law that protects individuals from health insurance or employment discrimination  based on a genetic test result alone (with some exceptions, including employers with fewer than 15 employees, and ).  Although rare, STUART  does not cover discrimination protections in terms of life insurance, long term care, or disability insurances.  Visit the National Human Nebula Research Viper website to learn more.    Reducing Cancer Risk  All of the genes described in this handout have nationally recognized cancer screening guidelines that would be recommended for individuals who test positive.  In addition to increased cancer screening, surgeries may be offered or recommended to reduce cancer risk.  Recommendations are based upon an individual s genetic test result as well as their personal and family history of cancer.    Questions to Think About Regarding Genetic Testing:  What effect will the test result have on me and my relationship with my family members if I have an inherited gene mutation?  If I don t have a gene mutation?  Should I share my test results, and how will my family react to this news, which may also affect them?  Are my children ready to learn new information that may one day affect their own health?    Hereditary Cancer Resources    FORCE: Facing Our Risk of Cancer Empowered facingourrisk.org   Bright Pink bebrightpink.org   Li-Fraumeni Syndrome Association lfsassociation.org   PTEN World PTENworld.com   No stomach for cancer, Inc. nostomachforcancer.org   Stomach cancer relief network Scrnet.org   Collaborative Group of the Americas on Inherited Colorectal Cancer (CGA) cgaicc.com    Cancer Care cancercare.org   American Cancer Society (ACS) cancer.org   National Cancer Viper (NCI) cancer.gov     Please call us if you have any questions or concerns.   Cancer Risk Management Program [phone] ([phone])  Horace Quiles, MS [facility] [place] [facility] [place]  [phone]  Darline Pedersen, MS, [facility] [place] [facility] [place] [phone]  Nadine Huynh, MS, [facility] [place] [facility] [place]  [phone]  Vashti Leary, MS, [facility] [place] [facility] [place]  [phone]  Sophie Aguero,  MS, [facility] [place]  [phone]  Nitza Davila, MS, [facility] [place] [phone]  Мария Bailey, MS, [facility] [place] [phone]    References  Virginia Dumont PDP, Robb S, Lisa MANUEL, Deya JE, Shiv JL, Veronica N, Angelika H, Álvaro O, Jorge A, Pasini B, Radibart P, Manlucia S, Darron DM, Carbajal N, Vikki E, Leda H, Dolan E, Braxton J, Gronping J, Sascha B, Tulinius H, Thorlacius S, Eerola H, Nevanlinna H, Gurvinder K, Jorge OP. Average risks of breast and ovarian cancer associated with BRCA1 or BRCA2 mutations detected in case series unselected for family history: a combined analysis of 222 studies. Am J Hum Ladonna. 2003;72:1117-30.  Joseline N, Lexi M, Cheryl G.  BRCA1 and BRCA2 Hereditary Breast and Ovarian Cancer. Gene Reviews online. 2013.  Sacha YC, Toshia S, Christina G, Webster S. Breast cancer risk among male BRCA1 and BRCA2 mutation carriers. J Natl Cancer Inst. 2007;99:1811-4.  Kareem PLUNKETT, Cheri I, Arnoldo J, Benedicto E, Swati ER, Haley F. Risk of breast cancer in male BRCA2 carriers. J Med Ladonna. 2010;47:710-1.  National Comprehensive Cancer Network. Clinical practice guidelines in oncology, colorectal cancer screening. Available online (registration required). 2015.  Allen MH, Florin J, Lucien J, Judy MAGALLON, Asa MS, Eng C. Lifetime cancer risks in individuals with germline PTEN mutations. Clin Cancer Res. 2012;18:400-7.  Ghada R. Cowden Syndrome: A Critical Review of the Clinical Literature. J Ladonna . 2009:18:13-27.  [place] MONROY, Cesar DAVIS, Matt S, Ese P, Nena T, Julio César M, Venkatesh B, Aubrie H, Maksim R, Manuel K, Shabana L, Kareem PLUNKETT, Darron DAVIS, Scot DF, Del MR, The Breast Cancer Susceptibility Collaboration (UK) & Arturo TAYLOR. DUC mutations that cause ataxia-telangiectasia are breast cancer susceptibility alleles. Nature Genetics. 2006;38:873-875  Rocco N , Daniela Y, Jessica J, Cordelia L, Antwon HERNANDEZ , Ian ML, Henry S, Thang AG, Adriana S, Keily ML, Tala J , Kaden R, George DEXTER, Rosa  JR, Esther VE, Dave M, Vochepestein B, Juhi N, Kiko RH, Yary KW, and Alberto AP. DUC mutations in patients with hereditary pancreatic cancer. Cancer Discover. 2012;2:41-46  Rodney VALENCIA., et al. Breast-Cancer Risk in Families with Mutations in PALB2. NEJM. 2014; 371(6):497-506.  CHEK2 Breast Cancer Case-Control Consortium. CHEK2*1100delC and susceptibility to breast cancer: A collaborative analysis involving 10,860 breast cancer cases and 9,065 controls from 10 studies. Am J Hum Ladonna, 74 (2004), pp. 7328-6705  Charlene T, Carina S, Daily K, et al. Spectrum of Mutations in BRCA1, BRCA2, CHEK2, and TP53 in Families at High Risk of Breast Cancer. ROSALIO. 2006;295(12):9858-3801.   Reny C, Rolan D, Jaleel MONROY, et al. Risk of breast cancer in women with a CHEK2 mutation with and without a family history of breast cancer. J Clin Oncol. 2011;29:9263-6191.  Song H, Hernans E, Ramus SJ, et al. Contribution of germline mutations in the RAD51B, RAD51C, and RAD51D genes to ovarian cancer in the population. J Clin Oncol. 2015;33(26):7135-3172. Doi:10.1200/JCO.2015.61.2408.  Jose T, Cori DF, Lashawn P, et al. Mutations in BRIP1 confer high risk of ovarian cancer. Hina Ladonna. 2011;43(11):1721-9543. doi:10.1038/ng.955.

## 2023-07-18 DIAGNOSIS — F33.1 MAJOR DEPRESSIVE DISORDER, RECURRENT EPISODE, MODERATE (H): ICD-10-CM

## 2023-07-18 DIAGNOSIS — E78.5 HYPERLIPIDEMIA LDL GOAL <100: ICD-10-CM

## 2023-07-18 DIAGNOSIS — F41.9 ACUTE ANXIETY: ICD-10-CM

## 2023-07-21 RX ORDER — ATORVASTATIN CALCIUM 20 MG/1
TABLET, FILM COATED ORAL
Qty: 90 TABLET | Refills: 1 | Status: SHIPPED | OUTPATIENT
Start: 2023-07-21 | End: 2023-10-30

## 2023-07-21 RX ORDER — VORTIOXETINE 20 MG/1
TABLET, FILM COATED ORAL
Qty: 90 TABLET | Refills: 1 | Status: SHIPPED | OUTPATIENT
Start: 2023-07-21 | End: 2023-12-30

## 2023-07-25 DIAGNOSIS — H81.12 BPPV (BENIGN PAROXYSMAL POSITIONAL VERTIGO), LEFT: ICD-10-CM

## 2023-07-26 RX ORDER — ONDANSETRON 8 MG/1
TABLET, ORALLY DISINTEGRATING ORAL
Qty: 20 TABLET | Refills: 3 | Status: SHIPPED | OUTPATIENT
Start: 2023-07-26 | End: 2024-01-30

## 2023-08-14 ENCOUNTER — TRANSFERRED RECORDS (OUTPATIENT)
Dept: HEALTH INFORMATION MANAGEMENT | Facility: CLINIC | Age: 53
End: 2023-08-14
Payer: COMMERCIAL

## 2023-08-22 ENCOUNTER — OFFICE VISIT (OUTPATIENT)
Dept: FAMILY MEDICINE | Facility: CLINIC | Age: 53
End: 2023-08-22
Payer: COMMERCIAL

## 2023-08-22 VITALS
DIASTOLIC BLOOD PRESSURE: 72 MMHG | HEIGHT: 65 IN | RESPIRATION RATE: 13 BRPM | SYSTOLIC BLOOD PRESSURE: 116 MMHG | WEIGHT: 190 LBS | HEART RATE: 90 BPM | BODY MASS INDEX: 31.65 KG/M2 | OXYGEN SATURATION: 98 % | TEMPERATURE: 98.2 F

## 2023-08-22 DIAGNOSIS — M54.16 LUMBAR RADICULOPATHY: ICD-10-CM

## 2023-08-22 DIAGNOSIS — Z01.818 PREOP GENERAL PHYSICAL EXAM: Primary | ICD-10-CM

## 2023-08-22 DIAGNOSIS — G89.29 CHRONIC LEFT-SIDED LOW BACK PAIN WITH LEFT-SIDED SCIATICA: ICD-10-CM

## 2023-08-22 DIAGNOSIS — M54.42 CHRONIC LEFT-SIDED LOW BACK PAIN WITH LEFT-SIDED SCIATICA: ICD-10-CM

## 2023-08-22 DIAGNOSIS — L40.8 OTHER PSORIASIS: ICD-10-CM

## 2023-08-22 LAB
ALBUMIN SERPL BCG-MCNC: 4.3 G/DL (ref 3.5–5.2)
ALP SERPL-CCNC: 98 U/L (ref 35–104)
ALT SERPL W P-5'-P-CCNC: 16 U/L (ref 0–50)
ANION GAP SERPL CALCULATED.3IONS-SCNC: 8 MMOL/L (ref 7–15)
AST SERPL W P-5'-P-CCNC: 31 U/L (ref 0–45)
BILIRUB SERPL-MCNC: 0.2 MG/DL
BUN SERPL-MCNC: 17.6 MG/DL (ref 6–20)
CALCIUM SERPL-MCNC: 9.6 MG/DL (ref 8.6–10)
CHLORIDE SERPL-SCNC: 109 MMOL/L (ref 98–107)
CREAT SERPL-MCNC: 0.73 MG/DL (ref 0.51–0.95)
DEPRECATED HCO3 PLAS-SCNC: 26 MMOL/L (ref 22–29)
ERYTHROCYTE [DISTWIDTH] IN BLOOD BY AUTOMATED COUNT: 12.4 % (ref 10–15)
GFR SERPL CREATININE-BSD FRML MDRD: >90 ML/MIN/1.73M2
GLUCOSE SERPL-MCNC: 81 MG/DL (ref 70–99)
HCT VFR BLD AUTO: 36.8 % (ref 35–47)
HGB BLD-MCNC: 12.3 G/DL (ref 11.7–15.7)
MCH RBC QN AUTO: 30.8 PG (ref 26.5–33)
MCHC RBC AUTO-ENTMCNC: 33.4 G/DL (ref 31.5–36.5)
MCV RBC AUTO: 92 FL (ref 78–100)
PLATELET # BLD AUTO: 195 10E3/UL (ref 150–450)
POTASSIUM SERPL-SCNC: 4.3 MMOL/L (ref 3.4–5.3)
PROT SERPL-MCNC: 6.6 G/DL (ref 6.4–8.3)
RBC # BLD AUTO: 3.99 10E6/UL (ref 3.8–5.2)
SODIUM SERPL-SCNC: 143 MMOL/L (ref 136–145)
WBC # BLD AUTO: 5.2 10E3/UL (ref 4–11)

## 2023-08-22 PROCEDURE — 85027 COMPLETE CBC AUTOMATED: CPT | Performed by: FAMILY MEDICINE

## 2023-08-22 PROCEDURE — 36415 COLL VENOUS BLD VENIPUNCTURE: CPT | Performed by: FAMILY MEDICINE

## 2023-08-22 PROCEDURE — 80053 COMPREHEN METABOLIC PANEL: CPT | Performed by: FAMILY MEDICINE

## 2023-08-22 PROCEDURE — 99214 OFFICE O/P EST MOD 30 MIN: CPT | Performed by: FAMILY MEDICINE

## 2023-08-22 ASSESSMENT — PATIENT HEALTH QUESTIONNAIRE - PHQ9
10. IF YOU CHECKED OFF ANY PROBLEMS, HOW DIFFICULT HAVE THESE PROBLEMS MADE IT FOR YOU TO DO YOUR WORK, TAKE CARE OF THINGS AT HOME, OR GET ALONG WITH OTHER PEOPLE: SOMEWHAT DIFFICULT
SUM OF ALL RESPONSES TO PHQ QUESTIONS 1-9: 7
SUM OF ALL RESPONSES TO PHQ QUESTIONS 1-9: 7
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
## 2023-08-22 NOTE — PATIENT INSTRUCTIONS
For informational purposes only. Not to replace the advice of your health care provider. Copyright   2003,  [facility]. All rights reserved. Clinically reviewed by Hailey Salazar MD. [place] [id number] - REV .  Preparing for Your Surgery  Getting started  A nurse will call you to review your health history and instructions. They will give you an arrival time based on your scheduled surgery time. Please be ready to share:  Your doctor's clinic name and phone number  Your medical, surgical, and anesthesia history  A list of allergies and sensitivities  A list of medicines, including herbal treatments and over-the-counter drugs  Whether the patient has a legal guardian (ask how to send us the papers in advance)  Please tell us if you're pregnant--or if there's any chance you might be pregnant. Some surgeries may injure a fetus (unborn baby), so they require a pregnancy test. Surgeries that are safe for a fetus don't always need a test, and you can choose whether to have one.   If you have a child who's having surgery, please ask for a copy of Preparing for Your Child's Surgery.    Preparing for surgery  Within 10 to 30 days of surgery: Have a pre-op exam (sometimes called an H&P, or History and Physical). This can be done at a clinic or pre-operative center.  If you're having a , you may not need this exam. Talk to your care team.  At your pre-op exam, talk to your care team about all medicines you take. If you need to stop any medicines before surgery, ask when to start taking them again.  We do this for your safety. Many medicines can make you bleed too much during surgery. Some change how well surgery (anesthesia) drugs work.  Call your insurance company to let them know you're having surgery. (If you don't have insurance, call [phone].)  Call your clinic if there's any change in your health. This includes signs of a cold or flu (sore throat, runny nose, cough, rash, fever). It also  includes a scrape or scratch near the surgery site.  If you have questions on the day of surgery, call your hospital or surgery center.  Eating and drinking guidelines  For your safety: Unless your surgeon tells you otherwise, follow the guidelines below.  Eat and drink as usual until 8 hours before you arrive for surgery. After that, no food or milk.  Drink clear liquids until 2 hours before you arrive. These are liquids you can see through, like water, Gatorade, and Propel Water. They also include plain black coffee and tea (no cream or milk), candy, and breath mints. You can spit out gum when you arrive.  If you drink alcohol: Stop drinking it the night before surgery.  If your care team tells you to take medicine on the morning of surgery, it's okay to take it with a sip of water.  Preventing infection  Shower or bathe the night before and morning of your surgery. Follow the instructions your clinic gave you. (If no instructions, use regular soap.)  Don't shave or clip hair near your surgery site. We'll remove the hair if needed.  Don't smoke or vape the morning of surgery. You may chew nicotine gum up to 2 hours before surgery. A nicotine patch is okay.  Note: Some surgeries require you to completely quit smoking and nicotine. Check with your surgeon.  Your care team will make every effort to keep you safe from infection. We will:  Clean our hands often with soap and water (or an alcohol-based hand rub).  Clean the skin at your surgery site with a special soap that kills germs.  Give you a special gown to keep you warm. (Cold raises the risk of infection.)  Wear special hair covers, masks, gowns and gloves during surgery.  Give antibiotic medicine, if prescribed. Not all surgeries need antibiotics.  What to bring on the day of surgery  Photo ID and insurance card  Copy of your health care directive, if you have one  Glasses and hearing aids (bring cases)  You can't wear contacts during surgery  Inhaler and eye  drops, if you use them (tell us about these when you arrive)  CPAP machine or breathing device, if you use them  A few personal items, if spending the night  If you have . . .  A pacemaker, ICD (cardiac defibrillator) or other implant: Bring the ID card.  An implanted stimulator: Bring the remote control.  A legal guardian: Bring a copy of the certified (court-stamped) guardianship papers.  Please remove any jewelry, including body piercings. Leave jewelry and other valuables at home.  If you're going home the day of surgery  You must have a responsible adult drive you home. They should stay with you overnight as well.  If you don't have someone to stay with you, and you aren't safe to go home alone, we may keep you overnight. Insurance often won't pay for this.  After surgery  If it's hard to control your pain or you need more pain medicine, please call your surgeon's office.  Questions?   If you have any questions for your care team, list them here: _________________________________________________________________________________________________________________________________________________________________________ ____________________________________ ____________________________________ ____________________________________    How to Take Your Medication Before Surgery  - STOP taking all vitamins and herbal supplements 14 days before surgery.    Additional Medication Instructions:  0956}   - flurbiprofen (Ansaid): HOLD 3 days before surgery.    - Herbal medications and vitamins: HOLD 14 days prior to surgery.    Once weekly take methotrexate on Mondays - ok to continue per current guidelines   Pt may hold her methotrexate secondary to her perception of impaired healing with methotrexate.

## 2023-09-02 ENCOUNTER — TRANSFERRED RECORDS (OUTPATIENT)
Dept: HEALTH INFORMATION MANAGEMENT | Facility: CLINIC | Age: 53
End: 2023-09-02
Payer: COMMERCIAL

## 2023-09-07 ENCOUNTER — TRANSFERRED RECORDS (OUTPATIENT)
Dept: HEALTH INFORMATION MANAGEMENT | Facility: CLINIC | Age: 53
End: 2023-09-07
Payer: COMMERCIAL

## 2023-10-15 ENCOUNTER — IMMUNIZATION (OUTPATIENT)
Dept: FAMILY MEDICINE | Facility: CLINIC | Age: 53
End: 2023-10-15
Payer: COMMERCIAL

## 2023-10-15 PROCEDURE — 90480 ADMN SARSCOV2 VAC 1/ONLY CMP: CPT

## 2023-10-15 PROCEDURE — 91320 SARSCV2 VAC 30MCG TRS-SUC IM: CPT

## 2023-10-18 ASSESSMENT — PATIENT HEALTH QUESTIONNAIRE - PHQ9
SUM OF ALL RESPONSES TO PHQ QUESTIONS 1-9: 7
10. IF YOU CHECKED OFF ANY PROBLEMS, HOW DIFFICULT HAVE THESE PROBLEMS MADE IT FOR YOU TO DO YOUR WORK, TAKE CARE OF THINGS AT HOME, OR GET ALONG WITH OTHER PEOPLE: SOMEWHAT DIFFICULT
SUM OF ALL RESPONSES TO PHQ QUESTIONS 1-9: 7

## 2023-10-19 ENCOUNTER — VIRTUAL VISIT (OUTPATIENT)
Dept: FAMILY MEDICINE | Facility: CLINIC | Age: 53
End: 2023-10-19
Payer: COMMERCIAL

## 2023-10-19 DIAGNOSIS — N95.1 SYMPTOMATIC MENOPAUSAL OR FEMALE CLIMACTERIC STATES: Primary | ICD-10-CM

## 2023-10-19 PROCEDURE — 99213 OFFICE O/P EST LOW 20 MIN: CPT | Mod: 93 | Performed by: FAMILY MEDICINE

## 2023-10-19 RX ORDER — ESTRADIOL 0.5 MG/1
0.5 TABLET ORAL DAILY
Qty: 90 TABLET | Refills: 3 | Status: SHIPPED | OUTPATIENT
Start: 2023-10-19 | End: 2024-02-02

## 2023-10-19 RX ORDER — PROGESTERONE 100 MG/1
100 CAPSULE ORAL DAILY
Qty: 90 CAPSULE | Refills: 3 | Status: SHIPPED | OUTPATIENT
Start: 2023-10-19 | End: 2024-02-02

## 2023-10-19 ASSESSMENT — ANXIETY QUESTIONNAIRES
3. WORRYING TOO MUCH ABOUT DIFFERENT THINGS: SEVERAL DAYS
1. FEELING NERVOUS, ANXIOUS, OR ON EDGE: SEVERAL DAYS
2. NOT BEING ABLE TO STOP OR CONTROL WORRYING: SEVERAL DAYS
5. BEING SO RESTLESS THAT IT IS HARD TO SIT STILL: NOT AT ALL
IF YOU CHECKED OFF ANY PROBLEMS ON THIS QUESTIONNAIRE, HOW DIFFICULT HAVE THESE PROBLEMS MADE IT FOR YOU TO DO YOUR WORK, TAKE CARE OF THINGS AT HOME, OR GET ALONG WITH OTHER PEOPLE: SOMEWHAT DIFFICULT
7. FEELING AFRAID AS IF SOMETHING AWFUL MIGHT HAPPEN: SEVERAL DAYS
GAD7 TOTAL SCORE: 7
GAD7 TOTAL SCORE: 7
6. BECOMING EASILY ANNOYED OR IRRITABLE: MORE THAN HALF THE DAYS

## 2023-10-19 ASSESSMENT — PATIENT HEALTH QUESTIONNAIRE - PHQ9: 5. POOR APPETITE OR OVEREATING: SEVERAL DAYS

## 2023-10-19 NOTE — PATIENT INSTRUCTIONS
" [facility]  [address] [place] [address]  Office: [phone]   Fax:    [phone]     Return in about 2 months (around 12/19/2023) for Wellness/Preventative Visit, w/ Dr. GUERRERO for 40 min appt.            Thank you so much for doing a telephone visit with us today. And, again, thank you  for choosing our [facility].  Please contact us with any questions that you may have.   We appreciate the opportunity to serve you now and look forward to supporting your healthcare needs for a long time to come!    Our clinic for the foreseeable future and until further notice , will continue to offer virtual visits, including telephone visits, video visits,  and e-visits, especially during this period of COVID-19 coronavirus pandemic.  Please call to schedule another one if you need it!        Most Sincerely,     Maribel Short MD                                              To reach your [facility] care team after hours call:   [phone] press #[phone] \"to speak with your care team\".  This will get you to our clinic instead of routing to central [facility]  scheduling.     PLEASE NOTE OUR HOURS HAVE CHANGED secondary to COVID-19 coronavirus pandemic, as we are trying to minimize patient exposure to the virus,  which is now widespread in the [facility].  These hours may change with very little notice.  We apologize for any inconvenience.       Our current clinic hours are:    Our current clinic hours are:         Monday- Thursday   7:00am - 6:00pm  in person.      Friday  7:00am- 5:00pm                       Saturday and Sunday : Closed to in person and virtual visits        We have telephone and virtual visit times available between    7:00am - 6pm on Monday-Friday as well.                                                Phone:  [phone]      Our pharmacy hours:Monday  through Friday 8:00am to 5:00pm                       "  Saturday - 9:00 am to 12 noon         Sunday : Closed.                ###  Please note: at this time we are not accepting any walk-in visits. ###      There is also information available at our web site:  [email or web address]    If your provider ordered any lab tests and you do not receive the results within 10 business days, please call the clinic.    If you need a medication refill please contact your pharmacy.  Please allow 3 business days for your refill to be completed.    Our clinic offers telephone visits and e visits.  Please ask one of your team members to explain more.      Use [employer] (secure email communication and access to your chart) to send your primary care provider a message or make an appointment. Ask someone on your Team how to sign up for [employer].     Pharmacy is drive-thru only at this time secondary to the COVID-19 coronavirus pandemic, as we are trying to minimize patient exposure to the virus,  which is now widespread in the state.        There is also information available at our web site:  [email or web address]    If your provider ordered any lab tests and you do not receive the results within 10 business days, please call the clinic.    If you need a medication refill please contact your pharmacy.  Please allow 2 business days for your refill to be completed.
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
## 2023-10-29 DIAGNOSIS — E78.5 HYPERLIPIDEMIA LDL GOAL <100: ICD-10-CM

## 2023-10-30 RX ORDER — ATORVASTATIN CALCIUM 20 MG/1
TABLET, FILM COATED ORAL
Qty: 90 TABLET | Refills: 0 | Status: SHIPPED | OUTPATIENT
Start: 2023-10-30 | End: 2023-12-14

## 2023-12-14 ENCOUNTER — VIRTUAL VISIT (OUTPATIENT)
Dept: FAMILY MEDICINE | Facility: CLINIC | Age: 53
End: 2023-12-14
Payer: COMMERCIAL

## 2023-12-14 DIAGNOSIS — N95.1 SYMPTOMATIC MENOPAUSAL OR FEMALE CLIMACTERIC STATES: Primary | ICD-10-CM

## 2023-12-14 DIAGNOSIS — E78.5 HYPERLIPIDEMIA LDL GOAL <100: ICD-10-CM

## 2023-12-14 PROCEDURE — 99213 OFFICE O/P EST LOW 20 MIN: CPT | Mod: VID | Performed by: FAMILY MEDICINE

## 2023-12-14 RX ORDER — ATORVASTATIN CALCIUM 20 MG/1
20 TABLET, FILM COATED ORAL DAILY
Qty: 90 TABLET | Refills: 0 | Status: SHIPPED | OUTPATIENT
Start: 2023-12-14 | End: 2024-02-02

## 2023-12-14 ASSESSMENT — PATIENT HEALTH QUESTIONNAIRE - PHQ9
10. IF YOU CHECKED OFF ANY PROBLEMS, HOW DIFFICULT HAVE THESE PROBLEMS MADE IT FOR YOU TO DO YOUR WORK, TAKE CARE OF THINGS AT HOME, OR GET ALONG WITH OTHER PEOPLE: NOT DIFFICULT AT ALL
SUM OF ALL RESPONSES TO PHQ QUESTIONS 1-9: 1
SUM OF ALL RESPONSES TO PHQ QUESTIONS 1-9: 1
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
## 2023-12-30 DIAGNOSIS — M25.532 LEFT WRIST PAIN: ICD-10-CM

## 2023-12-30 DIAGNOSIS — F33.1 MAJOR DEPRESSIVE DISORDER, RECURRENT EPISODE, MODERATE (H): ICD-10-CM

## 2023-12-30 DIAGNOSIS — F41.9 ACUTE ANXIETY: ICD-10-CM

## 2023-12-30 RX ORDER — VORTIOXETINE 20 MG/1
TABLET, FILM COATED ORAL
Qty: 90 TABLET | Refills: 1 | Status: SHIPPED | OUTPATIENT
Start: 2023-12-30 | End: 2024-02-02

## 2023-12-30 RX ORDER — GABAPENTIN 300 MG/1
CAPSULE ORAL
Qty: 90 CAPSULE | Refills: 1 | Status: SHIPPED | OUTPATIENT
Start: 2023-12-30 | End: 2024-02-02
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
## 2024-01-28 ENCOUNTER — HEALTH MAINTENANCE LETTER (OUTPATIENT)
Age: 54
End: 2024-01-28

## 2024-01-29 DIAGNOSIS — H81.12 BPPV (BENIGN PAROXYSMAL POSITIONAL VERTIGO), LEFT: ICD-10-CM

## 2024-01-30 RX ORDER — ONDANSETRON 8 MG/1
TABLET, ORALLY DISINTEGRATING ORAL
Qty: 20 TABLET | Refills: 3 | Status: SHIPPED | OUTPATIENT
Start: 2024-01-30 | End: 2024-02-02

## 2024-02-02 ENCOUNTER — OFFICE VISIT (OUTPATIENT)
Dept: FAMILY MEDICINE | Facility: CLINIC | Age: 54
End: 2024-02-02
Payer: COMMERCIAL

## 2024-02-02 VITALS
TEMPERATURE: 96.7 F | SYSTOLIC BLOOD PRESSURE: 110 MMHG | HEIGHT: 65 IN | RESPIRATION RATE: 10 BRPM | WEIGHT: 190 LBS | OXYGEN SATURATION: 100 % | BODY MASS INDEX: 31.65 KG/M2 | HEART RATE: 79 BPM | DIASTOLIC BLOOD PRESSURE: 66 MMHG

## 2024-02-02 DIAGNOSIS — Z13.29 SCREENING FOR THYROID DISORDER: ICD-10-CM

## 2024-02-02 DIAGNOSIS — Z00.01 ENCOUNTER FOR ROUTINE ADULT MEDICAL EXAM WITH ABNORMAL FINDINGS: Primary | ICD-10-CM

## 2024-02-02 DIAGNOSIS — F33.1 MAJOR DEPRESSIVE DISORDER, RECURRENT EPISODE, MODERATE (H): ICD-10-CM

## 2024-02-02 DIAGNOSIS — Z23 NEED FOR SHINGLES VACCINE: ICD-10-CM

## 2024-02-02 DIAGNOSIS — L40.50 PSORIATIC ARTHRITIS (H): ICD-10-CM

## 2024-02-02 DIAGNOSIS — H81.12 BPPV (BENIGN PAROXYSMAL POSITIONAL VERTIGO), LEFT: ICD-10-CM

## 2024-02-02 DIAGNOSIS — Z79.899 CONTROLLED SUBSTANCE AGREEMENT SIGNED: ICD-10-CM

## 2024-02-02 DIAGNOSIS — N95.1 SYMPTOMATIC MENOPAUSAL OR FEMALE CLIMACTERIC STATES: ICD-10-CM

## 2024-02-02 DIAGNOSIS — Z12.4 SCREENING FOR CERVICAL CANCER: ICD-10-CM
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
## 2024-02-02 DIAGNOSIS — G89.29 CHRONIC LEFT-SIDED LOW BACK PAIN WITH LEFT-SIDED SCIATICA: ICD-10-CM

## 2024-02-02 DIAGNOSIS — M54.42 CHRONIC LEFT-SIDED LOW BACK PAIN WITH LEFT-SIDED SCIATICA: ICD-10-CM

## 2024-02-02 DIAGNOSIS — M25.532 LEFT WRIST PAIN: ICD-10-CM

## 2024-02-02 DIAGNOSIS — Z82.62 FAMILY HISTORY OF OSTEOPOROSIS: ICD-10-CM

## 2024-02-02 DIAGNOSIS — E78.5 HYPERLIPIDEMIA LDL GOAL <100: ICD-10-CM

## 2024-02-02 DIAGNOSIS — F41.9 ACUTE ANXIETY: ICD-10-CM

## 2024-02-02 DIAGNOSIS — N90.7 LABIAL CYST: ICD-10-CM

## 2024-02-02 DIAGNOSIS — Z23 NEED FOR VACCINATION FOR STREP PNEUMONIAE: ICD-10-CM

## 2024-02-02 DIAGNOSIS — E55.9 VITAMIN D DEFICIENCY: ICD-10-CM

## 2024-02-02 PROCEDURE — 99214 OFFICE O/P EST MOD 30 MIN: CPT | Mod: 25 | Performed by: FAMILY MEDICINE

## 2024-02-02 PROCEDURE — G0145 SCR C/V CYTO,THINLAYER,RESCR: HCPCS | Performed by: FAMILY MEDICINE

## 2024-02-02 PROCEDURE — 90677 PCV20 VACCINE IM: CPT | Performed by: FAMILY MEDICINE

## 2024-02-02 PROCEDURE — 87624 HPV HI-RISK TYP POOLED RSLT: CPT | Performed by: FAMILY MEDICINE

## 2024-02-02 PROCEDURE — 99396 PREV VISIT EST AGE 40-64: CPT | Mod: 25 | Performed by: FAMILY MEDICINE

## 2024-02-02 PROCEDURE — 90471 IMMUNIZATION ADMIN: CPT | Performed by: FAMILY MEDICINE

## 2024-02-02 RX ORDER — ESTRADIOL 0.5 MG/1
0.5 TABLET ORAL DAILY
Qty: 90 TABLET | Refills: 3 | Status: SHIPPED | OUTPATIENT
Start: 2024-02-02

## 2024-02-02 RX ORDER — GABAPENTIN 300 MG/1
CAPSULE ORAL
Qty: 90 CAPSULE | Refills: 1 | Status: SHIPPED | OUTPATIENT
Start: 2024-02-02 | End: 2024-08-09

## 2024-02-02 RX ORDER — ONDANSETRON 8 MG/1
8 TABLET, ORALLY DISINTEGRATING ORAL EVERY 8 HOURS PRN
Qty: 20 TABLET | Refills: 5 | Status: SHIPPED | OUTPATIENT
Start: 2024-02-02 | End: 2024-08-09

## 2024-02-02 RX ORDER — PROGESTERONE 100 MG/1
100 CAPSULE ORAL DAILY
Qty: 90 CAPSULE | Refills: 3 | Status: SHIPPED | OUTPATIENT
Start: 2024-02-02

## 2024-02-02 RX ORDER — ATORVASTATIN CALCIUM 20 MG/1
20 TABLET, FILM COATED ORAL DAILY
Qty: 90 TABLET | Refills: 3 | Status: SHIPPED | OUTPATIENT
Start: 2024-02-02 | End: 2024-08-09

## 2024-02-02 SDOH — HEALTH STABILITY: PHYSICAL HEALTH: ON AVERAGE, HOW MANY DAYS PER WEEK DO YOU ENGAGE IN MODERATE TO STRENUOUS EXERCISE (LIKE A BRISK WALK)?: 3 DAYS

## 2024-02-02 SDOH — HEALTH STABILITY: PHYSICAL HEALTH: ON AVERAGE, HOW MANY MINUTES DO YOU ENGAGE IN EXERCISE AT THIS LEVEL?: 20 MIN

## 2024-02-02 ASSESSMENT — ANXIETY QUESTIONNAIRES
GAD7 TOTAL SCORE: 6
1. FEELING NERVOUS, ANXIOUS, OR ON EDGE: SEVERAL DAYS
GAD7 TOTAL SCORE: 6
IF YOU CHECKED OFF ANY PROBLEMS ON THIS QUESTIONNAIRE, HOW DIFFICULT HAVE THESE PROBLEMS MADE IT FOR YOU TO DO YOUR WORK, TAKE CARE OF THINGS AT HOME, OR GET ALONG WITH OTHER PEOPLE: SOMEWHAT DIFFICULT
6. BECOMING EASILY ANNOYED OR IRRITABLE: SEVERAL DAYS
7. FEELING AFRAID AS IF SOMETHING AWFUL MIGHT HAPPEN: SEVERAL DAYS
2. NOT BEING ABLE TO STOP OR CONTROL WORRYING: SEVERAL DAYS
5. BEING SO RESTLESS THAT IT IS HARD TO SIT STILL: NOT AT ALL
3. WORRYING TOO MUCH ABOUT DIFFERENT THINGS: SEVERAL DAYS

## 2024-02-02 ASSESSMENT — PATIENT HEALTH QUESTIONNAIRE - PHQ9
SUM OF ALL RESPONSES TO PHQ QUESTIONS 1-9: 1
5. POOR APPETITE OR OVEREATING: SEVERAL DAYS

## 2024-02-02 ASSESSMENT — SOCIAL DETERMINANTS OF HEALTH (SDOH): HOW OFTEN DO YOU GET TOGETHER WITH FRIENDS OR RELATIVES?: PATIENT DECLINED

## 2024-02-02 NOTE — LETTER

## 2024-02-02 NOTE — PATIENT INSTRUCTIONS
" [facility]  [address] [place] [address]  Office: [phone]   Fax:    [phone]     Continue to Try hand to heart exercises with breathing to help with calm. Can combine with box breathing -  box - 4-4-4-4 breathing - in for 4, hold for 4, out for 4, hold for 4 and repeat as needed.    Also 4, 7, 8 breathing - breathe in for count of 4, hold for count of 7, then breathe out for count of 8.       Try visualization in your relaxing,happy place as well.    Try to take just a few minutes to do the above.         Learning About Being Physically Active  What is physical activity?     Being physically active means doing any kind of activity that gets your body moving.  The types of physical activity that can help you get fit and stay healthy include:  Aerobic or \"cardio\" activities. These make your heart beat faster and make you breathe harder, such as brisk walking, riding a bike, or running. They strengthen your heart and lungs and build up your endurance.  Strength training activities. These make your muscles work against, or \"resist,\" something. Examples include lifting weights or doing push-ups. These activities help tone and strengthen your muscles and bones.  Stretches. These let you move your joints and muscles through their full range of motion. Stretching helps you be more flexible.  Reaching a balance between these three types of physical activity is important because each one contributes to your overall fitness.  What are the benefits of being active?  Being active is one of the best things you can do for your health. It helps you to:  Feel stronger and have more energy to do all the things you like to do.  Focus better at school or work.  Feel, think, and sleep better.  Reach and stay at a healthy weight.  Lose fat and build lean muscle.  Lower your risk for serious health problems, including diabetes, heart attack, high blood pressure, and some cancers.  Keep your " "heart, lungs, bones, muscles, and joints strong and healthy.  How can you make being active part of your life?  Start slowly. Make it your long-term goal to get at least 30 minutes of exercise on most days of the week. Walking is a good choice. You also may want to do other activities, such as running, swimming, cycling, or playing tennis or team sports.  Pick activities that you like--ones that make your heart beat faster, your muscles stronger, and your muscles and joints more flexible. If you find more than one thing you like doing, do them all. You don't have to do the same thing every day.  Get your heart pumping every day. Any activity that makes your heart beat faster and keeps it at that rate for a while counts.  Here are some great ways to get your heart beating faster:  Go for a brisk walk, run, or hike.  Go for a swim or bike ride.  Take an online exercise class or dance.  Play a game of touch football, basketball, or soccer.  Play tennis, pickleball, or racquetball.  Climb stairs.  Even some household chores can be aerobic. Just do them at a faster pace. Raking or mowing the lawn, sweeping the garage, and vacuuming and cleaning your home all can help get your heart rate up.  Strengthen your muscles during the week. You don't have to lift heavy weights or grow big, bulky muscles to get stronger. Doing a few simple activities that make your muscles work against, or \"resist,\" something can help you get stronger. Aim for at least twice a week.  For example, you can:  Do push-ups or sit-ups, which use your own body weight as resistance.  Lift weights or dumbbells or use stretch bands at home or in a gym or community center.  Stretch your muscles often. Stretching will help you as you become more active. It can help you stay flexible and loosen tight muscles. It can also help improve your balance and posture and can be a great way to relax.  Be sure to stretch the muscles you'll be using when you work out. " "It's best to warm your muscles slightly before you stretch them. Walk or do some other light aerobic activity for a few minutes. Then start stretching.  When you stretch your muscles:  Do it slowly. Stretching is not about going fast or making sudden movements.  Don't push or bounce during a stretch.  Hold each stretch for at least 15 to 30 seconds, if you can. You should feel a stretch in the muscle, but not pain.  Breathe out as you do the stretch. Then breathe in as you hold the stretch. Don't hold your breath.  If you're worried about how more activity might affect your health, have a checkup before you start. Follow any special advice your doctor gives you for getting a smart start.  Where can you learn more?  Go to https://www.Backchannelmedia.net/patiented  Enter W332 in the search box to learn more about \"Learning About Being Physically Active.\"  Current as of: June 6, 2023               Content Version: 13.8    0316-0889 [employer].   Care instructions adapted under license by your healthcare professional. If you have questions about a medical condition or this instruction, always ask your healthcare professional. [employer] disclaims any warranty or liability for your use of this information.      Eating Healthy Foods: Care Instructions  With every meal, you can make healthy food choices. Try to eat a variety of fruits, vegetables, whole grains, lean proteins, and low-fat dairy products. This can help you get the right balance of nutrients, including vitamins and minerals. Small changes add up over time. You can start by adding one healthy food to your meals each day.    Try to make half your plate fruits and vegetables, one-fourth whole grains, and one-fourth lean proteins. Try including dairy with your meals.   Eat more fruits and vegetables. Try to have them with most meals and snacks.   Foods for healthy eating    Fruits    These can be fresh, frozen, canned, or dried.  Try to " "choose whole fruit rather than fruit juice.  Eat a variety of colors.    Vegetables    These can be fresh, frozen, canned, or dried.  Beans, peas, and lentils count too.    Whole grains    Choose whole-grain breads, cereals, and noodles.  Try brown rice.    Lean proteins    These can include lean meat, poultry, fish, and eggs.  You can also have tofu, beans, peas, lentils, nuts, and seeds.    Dairy    Try milk, yogurt, and cheese.  Choose low-fat or fat-free when you can.  If you need to, use lactose-free milk or fortified plant-based milk products, such as soy milk.    Water    Drink water when you're thirsty.  Limit sugar-sweetened drinks, including soda, fruit drinks, and sports drinks.  Where can you learn more?  Go to https://www.SpearF[employer]h.net/patiented  Enter T756 in the search box to learn more about \"Eating Healthy Foods: Care Instructions.\"  Current as of: February 28, 2023               Content Version: 13.8    8736-5932 [employer].   Care instructions adapted under license by your healthcare professional. If you have questions about a medical condition or this instruction, always ask your healthcare professional. [employer] disclaims any warranty or liability for your use of this information.      Preventing Falls: Care Instructions  Injuries and health problems such as trouble walking or poor eyesight can increase your risk of falling. So can some medicines. But there are things you can do to help prevent falls. You can exercise to get stronger. You can also arrange your home to make it safer.    Talk to your doctor about the medicines you take. Ask if any of them increase the risk of falls and whether they can be changed or stopped.   Try to exercise regularly. It can help improve your strength and balance. This can help lower your risk of falling.     Practice fall safety and prevention.    Wear low-heeled shoes that fit well and give your feet good support. Talk to your " "doctor if you have foot problems that make this hard.  Carry a cellphone or wear a medical alert device that you can use to call for help.  Use stepladders instead of chairs to reach high objects. Don't climb if you're at risk for falls. Ask for help, if needed.  Wear the correct eyeglasses, if you need them.    Make your home safer.    Remove rugs, cords, clutter, and furniture from walkways.  Keep your house well lit. Use night-lights in hallways and bathrooms.  Install and use sturdy handrails on stairways.  Wear nonskid footwear, even inside. Don't walk barefoot or in socks without shoes.    Be safe outside.    Use handrails, curb cuts, and ramps whenever possible.  Keep your hands free by using a shoulder bag or backpack.  Try to walk in well-lit areas. Watch out for uneven ground, changes in pavement, and debris.  Be careful in the winter. Walk on the grass or gravel when sidewalks are slippery. Use de-icer on steps and walkways. Add non-slip devices to shoes.    Put grab bars and nonskid mats in your shower or tub and near the toilet. Try to use a shower chair or bath bench when bathing.   Get into a tub or shower by putting in your weaker leg first. Get out with your strong side first. Have a phone or medical alert device in the bathroom with you.   Where can you learn more?  Go to https://www.[employer].net/patiented  Enter G117 in the search box to learn more about \"Preventing Falls: Care Instructions.\"  Current as of: July 18, 2023               Content Version: 13.8    6647-8935 [employer].   Care instructions adapted under license by your healthcare professional. If you have questions about a medical condition or this instruction, always ask your healthcare professional. [employer] disclaims any warranty or liability for your use of this information.      Learning About Stress  What is stress?     Stress is your body's response to a hard situation. Your body can have a " physical, emotional, or mental response. Stress is a fact of life for most people, and it affects everyone differently. What causes stress for you may not be stressful for someone else.  A lot of things can cause stress. You may feel stress when you go on a job interview, take a test, or run a race. This kind of short-term stress is normal and even useful. It can help you if you need to work hard or react quickly. For example, stress can help you finish an important job on time.  Long-term stress is caused by ongoing stressful situations or events. Examples of long-term stress include long-term health problems, ongoing problems at work, or conflicts in your family. Long-term stress can harm your health.  How does stress affect your health?  When you are stressed, your body responds as though you are in danger. It makes hormones that speed up your heart, make you breathe faster, and give you a burst of energy. This is called the fight-or-flight stress response. If the stress is over quickly, your body goes back to normal and no harm is done.  But if stress happens too often or lasts too long, it can have bad effects. Long-term stress can make you more likely to get sick, and it can make symptoms of some diseases worse. If you tense up when you are stressed, you may develop neck, shoulder, or low back pain. Stress is linked to high blood pressure and heart disease.  Stress also harms your emotional health. It can make you miles, tense, or depressed. Your relationships may suffer, and you may not do well at work or school.  What can you do to manage stress?  You can try these things to help manage stress:   Do something active. Exercise or activity can help reduce stress. Walking is a great way to get started. Even everyday activities such as housecleaning or yard work can help.  Try yoga or joe chi. These techniques combine exercise and meditation. You may need some training at first to learn them.  Do something you  "enjoy. For example, listen to music or go to a movie. Practice your hobby or do volunteer work.  Meditate. This can help you relax, because you are not worrying about what happened before or what may happen in the future.  Do guided imagery. Imagine yourself in any setting that helps you feel calm. You can use online videos, books, or a teacher to guide you.  Do breathing exercises. For example:  From a standing position, bend forward from the waist with your knees slightly bent. Let your arms dangle close to the floor.  Breathe in slowly and deeply as you return to a standing position. Roll up slowly and lift your head last.  Hold your breath for just a few seconds in the standing position.  Breathe out slowly and bend forward from the waist.  Let your feelings out. Talk, laugh, cry, and express anger when you need to. Talking with supportive friends or family, a counselor, or a francois leader about your feelings is a healthy way to relieve stress. Avoid discussing your feelings with people who make you feel worse.  Write. It may help to write about things that are bothering you. This helps you find out how much stress you feel and what is causing it. When you know this, you can find better ways to cope.  What can you do to prevent stress?  You might try some of these things to help prevent stress:  Manage your time. This helps you find time to do the things you want and need to do.  Get enough sleep. Your body recovers from the stresses of the day while you are sleeping.  Get support. Your family, friends, and community can make a difference in how you experience stress.  Limit your news feed. Avoid or limit time on social media or news that may make you feel stressed.  Do something active. Exercise or activity can help reduce stress. Walking is a great way to get started.  Where can you learn more?  Go to https://www.healthwise.net/patiented  Enter N032 in the search box to learn more about \"Learning About " "Stress.\"  Current as of: February 26, 2023               Content Version: 13.8    0947-7763 [employer].   Care instructions adapted under license by your healthcare professional. If you have questions about a medical condition or this instruction, always ask your healthcare professional. [employer] disclaims any warranty or liability for your use of this information.      Preventive Care Advice   This is general advice given by our system to help you stay healthy. However, your care team may have specific advice just for you. Please talk to your care team about your preventive care needs.  Nutrition  Eat 5 or more servings of fruits and vegetables each day.  Try wheat bread, brown rice and whole grain pasta (instead of white bread, rice, and pasta).  Get enough calcium and vitamin D. Check the label on foods and aim for 100% of the RDA (recommended daily allowance).  Lifestyle  Exercise at least 150 minutes each week  (30 minutes a day, 5 days a week).  Do muscle strengthening activities 2 days a week. These help control your weight and prevent disease.  No smoking.  Wear sunscreen to prevent skin cancer.  Have a dental exam and cleaning every 6 months.  Yearly exams  See your health care team every year to talk about:  Any changes in your health.  Any medicines your care team has prescribed.  Preventive care, family planning, and ways to prevent chronic diseases.  Shots (vaccines)   HPV shots (up to age 26), if you've never had them before.  Hepatitis B shots (up to age 59), if you've never had them before.  COVID-19 shot: Get this shot when it's due.  Flu shot: Get a flu shot every year.  Tetanus shot: Get a tetanus shot every 10 years.  Pneumococcal, hepatitis A, and RSV shots: Ask your care team if you need these based on your risk.  Shingles shot (for age 50 and up)  General health tests  Diabetes screening:  Starting at age 35, Get screened for diabetes at least every 3 years.  If " you are younger than age 35, ask your care team if you should be screened for diabetes.  Cholesterol test: At age 39, start having a cholesterol test every 5 years, or more often if advised.  Bone density scan (DEXA): At age 50, ask your care team if you should have this scan for osteoporosis (brittle bones).  Hepatitis C: Get tested at least once in your life.  STIs (sexually transmitted infections)  Before age 24: Ask your care team if you should be screened for STIs.  After age 24: Get screened for STIs if you're at risk. You are at risk for STIs (including HIV) if:  You are sexually active with more than one person.  You don't use condoms every time.  You or a partner was diagnosed with a sexually transmitted infection.  If you are at risk for HIV, ask about PrEP medicine to prevent HIV.  Get tested for HIV at least once in your life, whether you are at risk for HIV or not.  Cancer screening tests  Cervical cancer screening: If you have a cervix, begin getting regular cervical cancer screening tests starting at age 21.  Breast cancer scan (mammogram): If you've ever had breasts, begin having regular mammograms starting at age 40. This is a scan to check for breast cancer.  Colon cancer screening: It is important to start screening for colon cancer at age 45.  Have a colonoscopy test every 10 years (or more often if you're at risk) Or, ask your provider about stool tests like a FIT test every year or Cologuard test every 3 years.  To learn more about your testing options, visit:   https://[email or web address]/145078.pdf.  For help making a decision, visit:   https://bit.ly/bf77299.  Prostate cancer screening test: If you have a prostate, ask your care team if a prostate cancer screening test (PSA) at age 55 is right for you.  Lung cancer screening: If you are a current or former smoker ages 50 to 80, ask your care team if ongoing lung cancer screenings are right for you.  For informational purposes only. Not to  replace the advice of your health care provider. Copyright   2023 [facility]. All rights reserved. Clinically reviewed by the [facility]itions [facility]. [employer] 529031 - REV 01/24.  Discussed need for resistance training to help keep bones strong and decrease age -related muscle loss, decreasing insulin resistance and increasing basal metabolic rate to help burn more calories at rest and with exertion.     Clinical References    Resistance Training With Free Weights: Exercises  Introduction  Here are some examples of exercises for resistance training. Start each exercise slowly. Ease off the exercise if you start to have pain.  Your doctor or physical therapist will tell you when you can start these exercises and which ones will work best for you.  How to do the exercises  Chest fly    Lie on a bench or exercise ball, and hold the weights straight up over your chest. Do not lock your elbows. You can keep them slightly bent if that is comfortable for you.  Slowly lower your arms, keeping them extended, until the weights are level with your chest, or slightly lower.  Slowly raise your arms until you are in the starting position.  Repeat 8 to 12 times.  Rest for a minute, and repeat the exercise.  Arm raise to the side (elbows bent)    Stand with your feet shoulder-width apart and your knees slightly bent. Or sit up straight in a chair.  Hold a 1- to 2-pound weight in each hand. The weight may be a dumbbell, a can of food, or a filled water bottle.  Bend your elbows 90 degrees while keeping them at your sides. With your palms facing in, hold the weights straight in front of you.  Slowly lift the weights and your elbows out to the sides to shoulder level, keeping your elbows bent. Keep your shoulders down and relaxed as you lift. If you find that you are shrugging your shoulders up toward your ears, your weights may be too heavy. Try using lighter weights (or even no weights).  Slowly lower  the weights and your elbows until your elbows are back at your sides.  Repeat 8 to 12 times.  Biceps curls    Sit leaning forward with your legs slightly spread and your left hand on your left thigh.  Hold the weight in your right hand, and place your right elbow on your right thigh.  Slowly curl the weight up and toward your chest.  Slowly lower the weight to the original position.  Repeat 8 to 12 times.  Rest for a minute, and repeat the exercise.  Do the same exercise with your other arm.  Follow-up care is a key part of your treatment and safety. Be sure to make and go to all appointments, and call your doctor if you are having problems. It's also a good idea to know your test results and keep a list of the medicines you take.  Current as of: June 6, 2023               Content Version: 13.8    9663-6826 [employer].   Care instructions adapted under license by your healthcare professional. If you have questions about a medical condition or this instruction, always ask your healthcare professional. [employer] disclaims any warranty or liability for your use of this information.        Resistance Training With Surgical Tubing: Exercises  Introduction  Here are some examples of exercises for resistance training. Start each exercise slowly. Ease off the exercise if you start to have pain.  Your doctor or physical therapist will tell you when you can start these exercises and which ones will work best for you.  How to do the exercises  Side pull    Sit or stand up straight. Grasp an exercise band with your hands about shoulder-width apart.  Raise both arms overhead, palms of your hands facing forward.  Slowly pull one arm down and to the side, bending your elbow and stretching the band until your elbow is at shoulder height. Hold for 1 to 2 seconds.  Slowly return to the starting position with your arms straight up.  Repeat with the other arm.  Repeat 8 to 12 times with each arm.  Overhead  pull    Sit or stand up straight. Grasp an exercise band with your hands about shoulder-width apart.  Raise both arms overhead, palms of your hands facing forward.  Slowly pull your hands apart, stretching the band. Hold for 1 to 2 seconds.  Slowly return to the starting position with your arms straight up.  Repeat 8 to 12 times.  Up-down pull    Sit or stand up straight. Grasp an exercise band with your hands about shoulder width apart.  Raise both arms overhead.  Bend your elbows until they are at shoulder height, with the stretched band either behind or in front of your head. Hold for 1 to 2 seconds.  Slowly return to the starting position with your arms straight up.  Repeat 8 to 12 times.  Chest-level pull    Sit or stand up straight. Grasp an exercise band with your hands about shoulder-width apart.  Raise your arms to chest level and bend your elbows.  Slowly pull your hands apart and your shoulder blades together, stretching the band. Hold for 1 to 2 seconds. Try to keep your hands up at your chest level, and do not pull your shoulders up toward your ears.  Slowly return to your starting position.  Repeat 8 to 12 times.  Hip-level pull    Stand or sit up straight in a chair without arms. Grasp an exercise band with your hands about shoulder-width apart.  Hold your hands at the level of your hips, or near your lap if you are sitting down.  Slowly pull your hands apart, stretching the band. Hold for 1 or 2 seconds.  Slowly return to your starting position.  Repeat 8 to 12 times.  Follow-up care is a key part of your treatment and safety. Be sure to make and go to all appointments, and call your doctor if you are having problems. It's also a good idea to know your test results and keep a list of the medicines you take.  Current as of: June 6, 2023               Content Version: 13.8    6435-7675 [employer].   Care instructions adapted under license by your healthcare professional. If you have  questions about a medical condition or this instruction, always ask your healthcare professional. [employer] disclaims any warranty or liability for your use of this information.         Fitness: Increasing Your Core Stability (02:03)  Your health professional recommends that you watch this short online health video.  Learn how to keep your core strong and prevent injury with two simple exercises.  Purpose:  Shows belly tightening and bridging to improve core stability. Emphasizes how a strong core helps prevent injury.  Goal:  The user will learn belly tightening and bridging to improve core stability.      How to watch the video     Scan the QR code   OR Visit the website    [email or web address]   Current as of: July 18, 2023               Content Version: 13.8    0891-8662 [employer].   Care instructions adapted under license by your healthcare professional. If you have questions about a medical condition or this instruction, always ask your healthcare professional. [employer] disclaims any warranty or liability for your use of this information.          Muscle Conditioning: Exercises  Introduction  Here are some examples of exercises for muscle conditioning. Start each exercise slowly. Ease off the exercise if you start to have pain.  Your doctor or physical therapist will tell you when you can start these exercises and which ones will work best for you.  How to do the exercises  Wall push-ups    When you can do this exercise against a wall comfortably (without your muscles feeling tired), you can try it against a counter. Start with 5 repetitions again and work up to 8 to 12. You can then slowly progress to the end of a couch or a sturdy chair, and finally to the floor.  Stand facing a wall, about 12 to 18 inches away.  Place your hands on the wall at shoulder height.  Slowly bend your elbows and bring your face toward the wall, moving your hips  and shoulders forward together.  Push slowly back to the starting position.  Start with 5 repetitions and work up to 8 to 12.  Rest for a minute, and repeat the exercise.    Side-lying leg lift    If this exercise becomes easy, you can add a light weight around your ankle or tie an elastic resistance band to both ankles.  Lie on your side, with your legs extended. Keep your hips straight up and down during this exercise. Do not let your top hip rock toward the back. Support your head with your hand, and place the other hand on the floor near your waist.  Slowly raise your upper leg until it is about in line with your shoulder. Keep your toes pointed forward.  Slowly lower your leg to the starting position.  Repeat 8 to 12 times.  Rest for a minute, and repeat the exercise.  Turn to your other side and do the same exercise with your other leg.  Shallow standing knee bends    Stand with your hands lightly resting on a counter or chair in front of you with your feet shoulder-width apart.  Slowly bend your knees so that you squat down just like you were going to sit in a chair. Make sure your knees do not go in front of your toes.  Lower yourself about 6 inches. Your heels should remain on the floor at all times.  Rise slowly to a standing position.  Repeat 8 to 12 times.  Rest for a minute, and repeat the exercise.  Follow-up care is a key part of your treatment and safety. Be sure to make and go to all appointments, and call your doctor if you are having problems. It's also a good idea to know your test results and keep a list of the medicines you take.  Current as of: June 6, 2023               Content Version: 13.8    7018-8416 [employer].   Care instructions adapted under license by your healthcare professional. If you have questions about a medical condition or this instruction, always ask your healthcare professional. [employer] disclaims any warranty or liability for your use of this  "information.         How to Do the Wall Sit Exercise (00:46)  Your health professional recommends that you watch this short online health video.  Learn how to do the wall sit exercise to increase strength and stability in your core and your legs.  Purpose:  Demonstrates the steps required to perform the wall sit exercise to increase strength and stability in the core and legs.  Goal:  The user will learn how to do the wall sit exercise to increase strength and stability in the core and legs.      How to watch the video     Scan the QR code   OR Visit the website    [email or web address]   Current as of: July 18, 2023               Content Version: 13.8    5118-5038 [employer].   Care instructions adapted under license by your healthcare professional. If you have questions about a medical condition or this instruction, always ask your healthcare professional. [employer] disclaims any warranty or liability for your use of this information.       Thank you so much or choosing [facility]  for your Health Care. It was a pleasure seeing you at your visit today! Please contact us with any questions or concerns you may have.                   Maribel Short MD                              To reach your [facility] care team after hours call:   [phone] press #[phone] \"to speak with your care team\".  This will get you to our clinic instead of routing to central [facility]  scheduling.     PLEASE NOTE OUR HOURS HAVE CHANGED secondary to COVID-19 coronavirus pandemic, as we are trying to minimize patient exposure to the virus,  which is now widespread in the [facility].  These hours may change with very little notice.  We apologize for any inconvenience.       Our current clinic hours are:          Monday- Thursday   7:00am - 6:00pm  in person.      Friday  7:00am- 5:00pm                       Saturday and Sunday : " Closed to in person and virtual visits        We have telephone and virtual visit times available between    7:00am - 6pm on Monday-Friday as well.                                                Phone:  [phone]      Our pharmacy hours: Monday through Friday 8:00am to 5:00pm                        Saturday - 9:00 am to 12 noon       Sunday : Closed.              Phone:  [phone]              ###  Please note: at this time we are not accepting any walk-in visits. ###      There is also information available at our web site:  [email or web address]    If your provider ordered any lab tests and you do not receive the results within 10 business days, please call the clinic.    If you need a medication refill please contact your pharmacy.  Please allow 3 business days for your refill to be completed.    Our clinic offers telephone visits and e visits.  Please ask one of your team members to explain more.      Use [employer] (secure email communication and access to your chart) to send your primary care provider a message or make an appointment. Ask someone on your Team how to sign up for [employer].
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
## 2024-02-07 LAB
BKR LAB AP GYN ADEQUACY: NORMAL
BKR LAB AP GYN INTERPRETATION: NORMAL
BKR LAB AP HPV REFLEX: NORMAL
BKR LAB AP LMP: NORMAL
BKR LAB AP PREVIOUS ABNORMAL: NORMAL
PATH REPORT.COMMENTS IMP SPEC: NORMAL
PATH REPORT.COMMENTS IMP SPEC: NORMAL
PATH REPORT.RELEVANT HX SPEC: NORMAL

## 2024-02-08 LAB
HUMAN PAPILLOMA VIRUS 16 DNA: NEGATIVE
HUMAN PAPILLOMA VIRUS 18 DNA: NEGATIVE
HUMAN PAPILLOMA VIRUS FINAL DIAGNOSIS: NORMAL
HUMAN PAPILLOMA VIRUS OTHER HR: NEGATIVE

## 2024-02-29 DIAGNOSIS — M46.90 INFLAMMATORY SPONDYLOPATHY (H): ICD-10-CM

## 2024-02-29 DIAGNOSIS — Z51.81 ENCOUNTER FOR THERAPEUTIC DRUG MONITORING: Primary | ICD-10-CM

## 2024-03-14 ENCOUNTER — TRANSFERRED RECORDS (OUTPATIENT)
Dept: HEALTH INFORMATION MANAGEMENT | Facility: CLINIC | Age: 54
End: 2024-03-14
Payer: COMMERCIAL

## 2024-03-20 ENCOUNTER — LAB (OUTPATIENT)
Dept: LAB | Facility: CLINIC | Age: 54
End: 2024-03-20
Payer: COMMERCIAL

## 2024-03-20 DIAGNOSIS — Z51.81 ENCOUNTER FOR THERAPEUTIC DRUG MONITORING: ICD-10-CM

## 2024-03-20 DIAGNOSIS — F33.1 MAJOR DEPRESSIVE DISORDER, RECURRENT EPISODE, MODERATE (H): ICD-10-CM

## 2024-03-20 DIAGNOSIS — E55.9 VITAMIN D DEFICIENCY: ICD-10-CM

## 2024-03-20 DIAGNOSIS — F41.9 ACUTE ANXIETY: ICD-10-CM

## 2024-03-20 DIAGNOSIS — E78.5 HYPERLIPIDEMIA LDL GOAL <100: ICD-10-CM

## 2024-03-20 DIAGNOSIS — M46.90 INFLAMMATORY SPONDYLOPATHY (H): ICD-10-CM

## 2024-03-20 DIAGNOSIS — Z13.29 SCREENING FOR THYROID DISORDER: ICD-10-CM

## 2024-03-20 LAB
ALBUMIN SERPL BCG-MCNC: 4 G/DL (ref 3.5–5.2)
ALP SERPL-CCNC: 94 U/L (ref 40–150)
ALT SERPL W P-5'-P-CCNC: 12 U/L (ref 0–50)
ANION GAP SERPL CALCULATED.3IONS-SCNC: 9 MMOL/L (ref 7–15)
AST SERPL W P-5'-P-CCNC: 26 U/L (ref 0–45)
BASOPHILS # BLD AUTO: 0 10E3/UL (ref 0–0.2)
BASOPHILS NFR BLD AUTO: 1 %
BILIRUB SERPL-MCNC: 0.4 MG/DL
BUN SERPL-MCNC: 19.1 MG/DL (ref 6–20)
CALCIUM SERPL-MCNC: 8.9 MG/DL (ref 8.6–10)
CHLORIDE SERPL-SCNC: 105 MMOL/L (ref 98–107)
CHOLEST SERPL-MCNC: 173 MG/DL
CREAT SERPL-MCNC: 0.84 MG/DL (ref 0.51–0.95)
CREAT UR-MCNC: 129 MG/DL
DEPRECATED HCO3 PLAS-SCNC: 26 MMOL/L (ref 22–29)
EGFRCR SERPLBLD CKD-EPI 2021: 83 ML/MIN/1.73M2
EOSINOPHIL # BLD AUTO: 0.2 10E3/UL (ref 0–0.7)
EOSINOPHIL NFR BLD AUTO: 4 %
ERYTHROCYTE [DISTWIDTH] IN BLOOD BY AUTOMATED COUNT: 12 % (ref 10–15)
FASTING STATUS PATIENT QL REPORTED: NORMAL
GLUCOSE SERPL-MCNC: 84 MG/DL (ref 70–99)
HCT VFR BLD AUTO: 37.5 % (ref 35–47)
HDLC SERPL-MCNC: 61 MG/DL
HGB BLD-MCNC: 12.2 G/DL (ref 11.7–15.7)
IMM GRANULOCYTES # BLD: 0 10E3/UL
IMM GRANULOCYTES NFR BLD: 0 %
LDLC SERPL CALC-MCNC: 87 MG/DL
LYMPHOCYTES # BLD AUTO: 1.2 10E3/UL (ref 0.8–5.3)
LYMPHOCYTES NFR BLD AUTO: 28 %
MCH RBC QN AUTO: 31 PG (ref 26.5–33)
MCHC RBC AUTO-ENTMCNC: 32.5 G/DL (ref 31.5–36.5)
MCV RBC AUTO: 95 FL (ref 78–100)
MICROALBUMIN UR-MCNC: <12 MG/L
MICROALBUMIN/CREAT UR: NORMAL MG/G{CREAT}
MONOCYTES # BLD AUTO: 0.5 10E3/UL (ref 0–1.3)
MONOCYTES NFR BLD AUTO: 11 %
NEUTROPHILS # BLD AUTO: 2.5 10E3/UL (ref 1.6–8.3)
NEUTROPHILS NFR BLD AUTO: 57 %
NONHDLC SERPL-MCNC: 112 MG/DL
PLATELET # BLD AUTO: 171 10E3/UL (ref 150–450)
POTASSIUM SERPL-SCNC: 4.1 MMOL/L (ref 3.4–5.3)
PROT SERPL-MCNC: 6.3 G/DL (ref 6.4–8.3)
RBC # BLD AUTO: 3.94 10E6/UL (ref 3.8–5.2)
SODIUM SERPL-SCNC: 140 MMOL/L (ref 135–145)
TRIGL SERPL-MCNC: 127 MG/DL
TSH SERPL DL<=0.005 MIU/L-ACNC: 2.46 UIU/ML (ref 0.3–4.2)
WBC # BLD AUTO: 4.4 10E3/UL (ref 4–11)

## 2024-03-20 PROCEDURE — 36415 COLL VENOUS BLD VENIPUNCTURE: CPT

## 2024-03-20 PROCEDURE — 85025 COMPLETE CBC W/AUTO DIFF WBC: CPT

## 2024-03-20 PROCEDURE — 82043 UR ALBUMIN QUANTITATIVE: CPT

## 2024-03-20 PROCEDURE — 80061 LIPID PANEL: CPT

## 2024-03-20 PROCEDURE — 82306 VITAMIN D 25 HYDROXY: CPT

## 2024-03-20 PROCEDURE — 80053 COMPREHEN METABOLIC PANEL: CPT

## 2024-03-20 PROCEDURE — 84443 ASSAY THYROID STIM HORMONE: CPT

## 2024-03-20 PROCEDURE — 82570 ASSAY OF URINE CREATININE: CPT

## 2024-03-23 LAB
DEPRECATED CALCIDIOL+CALCIFEROL SERPL-MC: <33 UG/L (ref 20–75)
VITAMIN D2 SERPL-MCNC: <5 UG/L
VITAMIN D3 SERPL-MCNC: 28 UG/L

## 2024-04-01 ENCOUNTER — TRANSFERRED RECORDS (OUTPATIENT)
Dept: HEALTH INFORMATION MANAGEMENT | Facility: CLINIC | Age: 54
End: 2024-04-01
Payer: COMMERCIAL

## 2024-04-19 ENCOUNTER — APPOINTMENT (OUTPATIENT)
Dept: GENERAL RADIOLOGY | Facility: CLINIC | Age: 54
End: 2024-04-19
Attending: EMERGENCY MEDICINE
Payer: OTHER MISCELLANEOUS

## 2024-04-19 ENCOUNTER — HOSPITAL ENCOUNTER (EMERGENCY)
Facility: CLINIC | Age: 54
Discharge: HOME | End: 2024-04-19
Attending: EMERGENCY MEDICINE | Admitting: EMERGENCY MEDICINE
Payer: OTHER MISCELLANEOUS

## 2024-04-19 VITALS
BODY MASS INDEX: 29.73 KG/M2 | HEART RATE: 85 BPM | TEMPERATURE: 98.5 F | HEIGHT: 66 IN | OXYGEN SATURATION: 99 % | DIASTOLIC BLOOD PRESSURE: 80 MMHG | SYSTOLIC BLOOD PRESSURE: 129 MMHG | RESPIRATION RATE: 16 BRPM | WEIGHT: 185 LBS

## 2024-04-19 DIAGNOSIS — M54.50 LEFT-SIDED LOW BACK PAIN WITHOUT SCIATICA, UNSPECIFIED CHRONICITY: ICD-10-CM
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
## 2024-04-19 PROCEDURE — 99283 EMERGENCY DEPT VISIT LOW MDM: CPT | Performed by: EMERGENCY MEDICINE

## 2024-04-19 PROCEDURE — 250N000013 HC RX MED GY IP 250 OP 250 PS 637: Performed by: EMERGENCY MEDICINE

## 2024-04-19 PROCEDURE — 99284 EMERGENCY DEPT VISIT MOD MDM: CPT | Performed by: EMERGENCY MEDICINE

## 2024-04-19 PROCEDURE — 72100 X-RAY EXAM L-S SPINE 2/3 VWS: CPT

## 2024-04-19 PROCEDURE — 72072 X-RAY EXAM THORAC SPINE 3VWS: CPT

## 2024-04-19 RX ORDER — GABAPENTIN 300 MG/1
300 CAPSULE ORAL ONCE
Status: COMPLETED | OUTPATIENT
Start: 2024-04-19 | End: 2024-04-19

## 2024-04-19 RX ORDER — OXYCODONE HYDROCHLORIDE 5 MG/1
5 TABLET ORAL ONCE
Status: COMPLETED | OUTPATIENT
Start: 2024-04-19 | End: 2024-04-19

## 2024-04-19 RX ORDER — OXYCODONE HYDROCHLORIDE 5 MG/1
5 TABLET ORAL EVERY 6 HOURS PRN
Qty: 12 TABLET | Refills: 0 | Status: SHIPPED | OUTPATIENT
Start: 2024-04-19 | End: 2024-04-22

## 2024-04-19 RX ADMIN — GABAPENTIN 300 MG: 300 CAPSULE ORAL at 08:11

## 2024-04-19 RX ADMIN — OXYCODONE HYDROCHLORIDE 5 MG: 5 TABLET ORAL at 09:18

## 2024-04-19 ASSESSMENT — ACTIVITIES OF DAILY LIVING (ADL)
ADLS_ACUITY_SCORE: 36

## 2024-04-19 ASSESSMENT — COLUMBIA-SUICIDE SEVERITY RATING SCALE - C-SSRS
1. IN THE PAST MONTH, HAVE YOU WISHED YOU WERE DEAD OR WISHED YOU COULD GO TO SLEEP AND NOT WAKE UP?: NO
2. HAVE YOU ACTUALLY HAD ANY THOUGHTS OF KILLING YOURSELF IN THE PAST MONTH?: NO
6. HAVE YOU EVER DONE ANYTHING, STARTED TO DO ANYTHING, OR PREPARED TO DO ANYTHING TO END YOUR LIFE?: NO

## 2024-04-19 NOTE — ED NOTES
Patient provided discharge paperwork and instructions. Educated on prescribed medication, pain management, and return precautions. Agrees to learning.  to  from lobby. Ambulates to exit with even and steady gait.

## 2024-04-19 NOTE — ED TRIAGE NOTES
Patient reports new tingling to left outer thigh after the fall.      Triage Assessment (Adult)       Row Name 04/19/24 0714          Triage Assessment    Airway WDL WDL        Respiratory WDL    Respiratory WDL WDL        Skin Circulation/Temperature WDL    Skin Circulation/Temperature WDL WDL        Cardiac WDL    Cardiac WDL WDL        Peripheral/Neurovascular WDL    Peripheral Neurovascular WDL WDL

## 2024-04-19 NOTE — Clinical Note
Milagro Frye was seen and treated in our emergency department on 4/19/2024.  She may return to work on 04/22/2024.  Lilia can return to work sooner if she feels improved.     If you have any questions or concerns, please don't hesitate to call.      Gisselle Pedroza MD

## 2024-04-19 NOTE — DISCHARGE INSTRUCTIONS
As discussed, call your orthopedist to move up your appointment.  They may decide to order repeat MRI, that will be their decision.  Take your gabapentin as needed as prescribed.  Only take oxycodone for breakthrough pain that is not controlled with Tylenol, Motrin, gabapentin, heat packs and ice packs.  Get plenty of rest.  Return to the emergency department if you are having weakness, inability to control your bowel or bladder, loss of sensation or if you have concerns.
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
## 2024-05-07 ENCOUNTER — HOSPITAL ENCOUNTER (OUTPATIENT)
Dept: MAMMOGRAPHY | Facility: CLINIC | Age: 54
Discharge: HOME | End: 2024-05-07
Attending: FAMILY MEDICINE | Admitting: FAMILY MEDICINE
Payer: COMMERCIAL

## 2024-05-07 DIAGNOSIS — Z12.31 VISIT FOR SCREENING MAMMOGRAM: ICD-10-CM

## 2024-05-07 LAB — SCANNED LAB RESULT: NORMAL

## 2024-05-07 PROCEDURE — 77063 BREAST TOMOSYNTHESIS BI: CPT

## 2024-05-20 ENCOUNTER — TRANSFERRED RECORDS (OUTPATIENT)
Dept: HEALTH INFORMATION MANAGEMENT | Facility: CLINIC | Age: 54
End: 2024-05-20
Payer: COMMERCIAL

## 2024-05-20 LAB
ALT SERPL-CCNC: 14 IU/L (ref 5–35)
AST SERPL-CCNC: 24 U/L (ref 5–34)
CREATININE (EXTERNAL): 0.7 MG/DL (ref 0.5–1.3)

## 2024-06-10 ENCOUNTER — TRANSFERRED RECORDS (OUTPATIENT)
Dept: HEALTH INFORMATION MANAGEMENT | Facility: CLINIC | Age: 54
End: 2024-06-10
Payer: COMMERCIAL

## 2024-08-09 ENCOUNTER — VIRTUAL VISIT (OUTPATIENT)
Dept: FAMILY MEDICINE | Facility: CLINIC | Age: 54
End: 2024-08-09
Attending: FAMILY MEDICINE
Payer: COMMERCIAL

## 2024-08-09 DIAGNOSIS — R45.851 SUICIDAL IDEATION: ICD-10-CM

## 2024-08-09 DIAGNOSIS — H81.12 BPPV (BENIGN PAROXYSMAL POSITIONAL VERTIGO), LEFT: ICD-10-CM

## 2024-08-09 DIAGNOSIS — Z79.899 CONTROLLED SUBSTANCE AGREEMENT SIGNED: ICD-10-CM

## 2024-08-09 DIAGNOSIS — G89.29 CHRONIC LEFT-SIDED LOW BACK PAIN WITH LEFT-SIDED SCIATICA: ICD-10-CM

## 2024-08-09 DIAGNOSIS — F41.9 ACUTE ANXIETY: ICD-10-CM

## 2024-08-09 DIAGNOSIS — M54.42 CHRONIC LEFT-SIDED LOW BACK PAIN WITH LEFT-SIDED SCIATICA: ICD-10-CM

## 2024-08-09 DIAGNOSIS — F33.1 MAJOR DEPRESSIVE DISORDER, RECURRENT EPISODE, MODERATE (H): Primary | ICD-10-CM

## 2024-08-09 DIAGNOSIS — E78.5 HYPERLIPIDEMIA LDL GOAL <100: ICD-10-CM

## 2024-08-09 PROCEDURE — 99214 OFFICE O/P EST MOD 30 MIN: CPT | Mod: 95 | Performed by: FAMILY MEDICINE

## 2024-08-09 RX ORDER — ATORVASTATIN CALCIUM 20 MG/1
20 TABLET, FILM COATED ORAL DAILY
Qty: 90 TABLET | Refills: 3 | Status: SHIPPED | OUTPATIENT
Start: 2024-08-09

## 2024-08-09 RX ORDER — GABAPENTIN 300 MG/1
CAPSULE ORAL
Qty: 90 CAPSULE | Refills: 1 | Status: SHIPPED | OUTPATIENT
Start: 2024-08-09

## 2024-08-09 RX ORDER — METHOTREXATE 25 MG/ML
INJECTION INTRA-ARTERIAL; INTRAMUSCULAR; INTRATHECAL; INTRAVENOUS
COMMUNITY
Start: 2024-05-20

## 2024-08-09 RX ORDER — LORAZEPAM 1 MG/1
1 TABLET ORAL DAILY PRN
Qty: 5 TABLET | Refills: 0 | Status: SHIPPED | OUTPATIENT
Start: 2024-08-09

## 2024-08-09 RX ORDER — ONDANSETRON 8 MG/1
8 TABLET, ORALLY DISINTEGRATING ORAL EVERY 8 HOURS PRN
Qty: 20 TABLET | Refills: 5 | Status: SHIPPED | OUTPATIENT
Start: 2024-08-09

## 2024-08-09 ASSESSMENT — ANXIETY QUESTIONNAIRES
6. BECOMING EASILY ANNOYED OR IRRITABLE: SEVERAL DAYS
7. FEELING AFRAID AS IF SOMETHING AWFUL MIGHT HAPPEN: SEVERAL DAYS
2. NOT BEING ABLE TO STOP OR CONTROL WORRYING: SEVERAL DAYS
4. TROUBLE RELAXING: SEVERAL DAYS
GAD7 TOTAL SCORE: 7
1. FEELING NERVOUS, ANXIOUS, OR ON EDGE: SEVERAL DAYS
IF YOU CHECKED OFF ANY PROBLEMS ON THIS QUESTIONNAIRE, HOW DIFFICULT HAVE THESE PROBLEMS MADE IT FOR YOU TO DO YOUR WORK, TAKE CARE OF THINGS AT HOME, OR GET ALONG WITH OTHER PEOPLE: SOMEWHAT DIFFICULT
GAD7 TOTAL SCORE: 7
7. FEELING AFRAID AS IF SOMETHING AWFUL MIGHT HAPPEN: SEVERAL DAYS
8. IF YOU CHECKED OFF ANY PROBLEMS, HOW DIFFICULT HAVE THESE MADE IT FOR YOU TO DO YOUR WORK, TAKE CARE OF THINGS AT HOME, OR GET ALONG WITH OTHER PEOPLE?: SOMEWHAT DIFFICULT
3. WORRYING TOO MUCH ABOUT DIFFERENT THINGS: SEVERAL DAYS
GAD7 TOTAL SCORE: 7
5. BEING SO RESTLESS THAT IT IS HARD TO SIT STILL: SEVERAL DAYS

## 2024-08-09 ASSESSMENT — COLUMBIA-SUICIDE SEVERITY RATING SCALE - C-SSRS
6. HAVE YOU EVER DONE ANYTHING, STARTED TO DO ANYTHING, OR PREPARED TO DO ANYTHING TO END YOUR LIFE?: NO
1. WITHIN THE PAST MONTH, HAVE YOU WISHED YOU WERE DEAD OR WISHED YOU COULD GO TO SLEEP AND NOT WAKE UP?: YES
2. IN THE PAST MONTH, HAVE YOU ACTUALLY HAD ANY THOUGHTS OF KILLING YOURSELF?: NO

## 2024-08-09 ASSESSMENT — PATIENT HEALTH QUESTIONNAIRE - PHQ9
10. IF YOU CHECKED OFF ANY PROBLEMS, HOW DIFFICULT HAVE THESE PROBLEMS MADE IT FOR YOU TO DO YOUR WORK, TAKE CARE OF THINGS AT HOME, OR GET ALONG WITH OTHER PEOPLE: SOMEWHAT DIFFICULT
SUM OF ALL RESPONSES TO PHQ QUESTIONS 1-9: 6
SUM OF ALL RESPONSES TO PHQ QUESTIONS 1-9: 6
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
## 2024-08-09 NOTE — PATIENT INSTRUCTIONS
[facility]  [address] [place] [address]  Office: [phone]   Fax:    [phone]       Community Resources:    If you are having thoughts of suicide, please know there is always help, 24/7/365. :     You can call ALWAYS CALL  988 - this will connect you to a suicide/mental health crisis lifeline (via the National Suicide Prevention Lifeline) . There is also a text and online chat feature to this number.     Other Resources:  National Institutes of Mental Myiaag590-471-9221liw.[facility] Ja[facility] Hospitalh.nih.gov  National [place] on Mental Uadaerf813-560-1662zvs.prateek.org   Mental Health Tjebqvu962-728-2265xsv.nmha.org  National Suicide Hfwbdew405-016-5935 (800-SUICIDE)  National Suicide Prevention Lifeline 181-231-3214 (218-223-IMCJ)www.suicidepreventionlifeline.org  Mental Health Association (www.mentalhealth.org): 593.409.7476 or 908-459-7762.   [place] Crisis Text Line. Text [place] to 330914  Suicide LifeLine Chat: suicidepreGames2Win.org/chat/

## 2024-08-17 ENCOUNTER — TRANSFERRED RECORDS (OUTPATIENT)
Dept: HEALTH INFORMATION MANAGEMENT | Facility: CLINIC | Age: 54
End: 2024-08-17
Payer: COMMERCIAL

## 2024-08-22 ENCOUNTER — TRANSFERRED RECORDS (OUTPATIENT)
Dept: HEALTH INFORMATION MANAGEMENT | Facility: CLINIC | Age: 54
End: 2024-08-22
Payer: COMMERCIAL

## 2024-09-09 ENCOUNTER — TRANSFERRED RECORDS (OUTPATIENT)
Dept: HEALTH INFORMATION MANAGEMENT | Facility: CLINIC | Age: 54
End: 2024-09-09
Payer: COMMERCIAL

## 2024-10-02 ENCOUNTER — IMMUNIZATION (OUTPATIENT)
Dept: FAMILY MEDICINE | Facility: CLINIC | Age: 54
End: 2024-10-02
Payer: COMMERCIAL

## 2024-10-02 PROCEDURE — 90471 IMMUNIZATION ADMIN: CPT

## 2024-10-02 PROCEDURE — 91320 SARSCV2 VAC 30MCG TRS-SUC IM: CPT

## 2024-10-02 PROCEDURE — 90656 IIV3 VACC NO PRSV 0.5 ML IM: CPT

## 2024-10-02 PROCEDURE — 90480 ADMN SARSCOV2 VAC 1/ONLY CMP: CPT

## 2024-10-08 ENCOUNTER — TRANSFERRED RECORDS (OUTPATIENT)
Dept: HEALTH INFORMATION MANAGEMENT | Facility: CLINIC | Age: 54
End: 2024-10-08
Payer: COMMERCIAL

## 2024-10-22 ASSESSMENT — ANXIETY QUESTIONNAIRES: GAD7 TOTAL SCORE: 0

## 2025-02-06 ENCOUNTER — TRANSFERRED RECORDS (OUTPATIENT)
Dept: HEALTH INFORMATION MANAGEMENT | Facility: CLINIC | Age: 55
End: 2025-02-06
Payer: COMMERCIAL

## 2025-02-06 LAB
ALT SERPL-CCNC: 12 IU/L (ref 6–32)
AST SERPL-CCNC: 23 U/L (ref 10–35)
CREATININE (EXTERNAL): 0.7 MG/DL (ref 0.5–1.05)

## 2025-02-19 ENCOUNTER — TRANSFERRED RECORDS (OUTPATIENT)
Dept: HEALTH INFORMATION MANAGEMENT | Facility: CLINIC | Age: 55
End: 2025-02-19
Payer: COMMERCIAL

## 2025-02-20 DIAGNOSIS — N95.1 SYMPTOMATIC MENOPAUSAL OR FEMALE CLIMACTERIC STATES: ICD-10-CM

## 2025-02-20 RX ORDER — PROGESTERONE 100 MG/1
100 CAPSULE ORAL DAILY
Qty: 90 CAPSULE | Refills: 0 | Status: SHIPPED | OUTPATIENT
Start: 2025-02-20

## 2025-02-21 RX ORDER — ESTRADIOL 0.5 MG/1
0.5 TABLET ORAL DAILY
Qty: 90 TABLET | Refills: 0 | Status: SHIPPED | OUTPATIENT
Start: 2025-02-21

## 2025-02-21 NOTE — TELEPHONE ENCOUNTER
Pt overdue for px - last one was 2/2/2024.  Up to date with mammography.     Please assist pt in making appt(s) for the above px in the next 2 months, please.

## 2025-03-17 ENCOUNTER — LAB (OUTPATIENT)
Dept: LAB | Facility: CLINIC | Age: 55
End: 2025-03-17
Payer: COMMERCIAL

## 2025-03-17 ENCOUNTER — PATIENT OUTREACH (OUTPATIENT)
Dept: CARE COORDINATION | Facility: CLINIC | Age: 55
End: 2025-03-17

## 2025-03-17 DIAGNOSIS — Z13.6 CARDIOVASCULAR SCREENING; LDL GOAL LESS THAN 130: ICD-10-CM

## 2025-03-17 LAB
ERYTHROCYTE [DISTWIDTH] IN BLOOD BY AUTOMATED COUNT: 11.8 % (ref 10–15)
HCT VFR BLD AUTO: 38.6 % (ref 35–47)
HGB BLD-MCNC: 12.6 G/DL (ref 11.7–15.7)
MCH RBC QN AUTO: 30.9 PG (ref 26.5–33)
MCHC RBC AUTO-ENTMCNC: 32.6 G/DL (ref 31.5–36.5)
MCV RBC AUTO: 95 FL (ref 78–100)
PLATELET # BLD AUTO: 161 10E3/UL (ref 150–450)
RBC # BLD AUTO: 4.08 10E6/UL (ref 3.8–5.2)
WBC # BLD AUTO: 4.4 10E3/UL (ref 4–11)

## 2025-03-17 PROCEDURE — 80061 LIPID PANEL: CPT

## 2025-03-17 PROCEDURE — 84443 ASSAY THYROID STIM HORMONE: CPT

## 2025-03-17 PROCEDURE — 36415 COLL VENOUS BLD VENIPUNCTURE: CPT

## 2025-03-17 PROCEDURE — 85027 COMPLETE CBC AUTOMATED: CPT

## 2025-03-17 PROCEDURE — 80053 COMPREHEN METABOLIC PANEL: CPT

## 2025-03-18 LAB
ALBUMIN SERPL BCG-MCNC: 4 G/DL (ref 3.5–5.2)
ALP SERPL-CCNC: 89 U/L (ref 40–150)
ALT SERPL W P-5'-P-CCNC: 15 U/L (ref 0–50)
ANION GAP SERPL CALCULATED.3IONS-SCNC: 10 MMOL/L (ref 7–15)
AST SERPL W P-5'-P-CCNC: 25 U/L (ref 0–45)
BILIRUB SERPL-MCNC: 0.3 MG/DL
BUN SERPL-MCNC: 20.7 MG/DL (ref 6–20)
CALCIUM SERPL-MCNC: 9.3 MG/DL (ref 8.8–10.4)
CHLORIDE SERPL-SCNC: 106 MMOL/L (ref 98–107)
CHOLEST SERPL-MCNC: 195 MG/DL
CREAT SERPL-MCNC: 0.84 MG/DL (ref 0.51–0.95)
EGFRCR SERPLBLD CKD-EPI 2021: 82 ML/MIN/1.73M2
FASTING STATUS PATIENT QL REPORTED: ABNORMAL
FASTING STATUS PATIENT QL REPORTED: ABNORMAL
GLUCOSE SERPL-MCNC: 85 MG/DL (ref 70–99)
HCO3 SERPL-SCNC: 24 MMOL/L (ref 22–29)
HDLC SERPL-MCNC: 60 MG/DL
LDLC SERPL CALC-MCNC: 102 MG/DL
NONHDLC SERPL-MCNC: 135 MG/DL
POTASSIUM SERPL-SCNC: 4 MMOL/L (ref 3.4–5.3)
PROT SERPL-MCNC: 6.3 G/DL (ref 6.4–8.3)
SODIUM SERPL-SCNC: 140 MMOL/L (ref 135–145)
TRIGL SERPL-MCNC: 164 MG/DL
TSH SERPL DL<=0.005 MIU/L-ACNC: 2.09 UIU/ML (ref 0.3–4.2)

## 2025-03-24 ENCOUNTER — MYC MEDICAL ADVICE (OUTPATIENT)
Dept: FAMILY MEDICINE | Facility: CLINIC | Age: 55
End: 2025-03-24
Payer: COMMERCIAL

## 2025-03-24 DIAGNOSIS — F41.9 ACUTE ANXIETY: ICD-10-CM

## 2025-03-25 NOTE — TELEPHONE ENCOUNTER
Please advise sending Lorazepam rx to [facility] [place]. Pharmacy desired attached.     See Testint message below

## 2025-03-26 RX ORDER — LORAZEPAM 1 MG/1
1 TABLET ORAL DAILY PRN
Qty: 10 TABLET | Refills: 0 | Status: SHIPPED | OUTPATIENT
Start: 2025-03-26

## 2025-03-29 ENCOUNTER — HEALTH MAINTENANCE LETTER (OUTPATIENT)
Age: 55
End: 2025-03-29

## 2025-04-23 ENCOUNTER — DOCUMENTATION ONLY (OUTPATIENT)
Dept: ONCOLOGY | Facility: CLINIC | Age: 55
End: 2025-04-23
Payer: COMMERCIAL

## 2025-04-23 DIAGNOSIS — Z80.0 FAMILY HISTORY OF MALIGNANT NEOPLASM OF PANCREAS: ICD-10-CM

## 2025-04-23 DIAGNOSIS — Z80.3 FAMILY HISTORY OF MALIGNANT NEOPLASM OF BREAST: Primary | ICD-10-CM

## 2025-04-23 DIAGNOSIS — Z80.8 FAMILY HISTORY OF MALIGNANT MELANOMA: ICD-10-CM

## 2025-04-23 NOTE — LETTER
April 23, 2025    Milagro Frye  [address] [address] [address]      Dear Milagro,    I hope this letter finds you well. Below, I have included a note summarizing a recent update to your prior genetic testing results. If you have any questions after receiving this letter, please feel free to contact me.    Referring Provider: Maribel Short MD      Presenting Information:  Milagro was previously seen in the Cancer Risk Management Program due to the family history of cancer. A Custom Cancer panel was ordered from [facility]. At that time, Milagro was found to have a variant of unknown significance (VUS) in the MSH2 and MUTYH genes. This variant in the MSH2 gene is called c.260C>G (p.Cmk24Gte) and the variant in the MUTYH gene is called c.925C>T (p.Kwd745Sif). No clinically significant variants were detected at that time in the 51 genes tested: APC, DUC, AXIN2, BAP1, BARD1, BMPR1A, BRCA1, BRCA2, BRIP1, CDH1, CDK4, CDKN2A, CHEK2, CTNNA1, DICER1, EPCAM, GREM1, HOXB13, KIT, MEN1, MITF, MLH1, MSH2, MSH3, MSH6, MUTYH, NBN, NF1, NTHL1, PALB2, PDGFRA, PMS2, POLD1, POLE, POT1, PTEN, RAD50, RAD51C, RAD51D, RB1, SDHA, SDHB, SDHC, SDHD, SMAD4, SMARCA4, STK11, TP53, TSC1, TSC2, and VHL.     Result Reclassification:  Recently Dede contacted me with a new report. The VUS in the MSH2 gene has been reclassified as Likely Benign. This means that the MSH2 variant found in Milagro is most likely NOT associated with Zhou syndrome nor an increased risk for cancer.       Of note, the variant in the MUTYH gene is still unchanged at this time.     Screening Recommendations:  Milagro should continue with cancer screening based on personal medical and family history, as previously discussed.     Summary:  We do not have an explanation for Milagro's family history of cancer. While no genetic changes were identified, Milagro may still be at risk for certain cancers due to family history, environmental factors, or other  genetic causes not identified by this test. Because of that, it is important that she continue with cancer screening based on her personal and family history as previously discussed.     Genetic testing is rapidly advancing, and new cancer susceptibility genes will most likely be identified in the future. Therefore, I encouraged Milagro to contact me annually or if there are changes in her personal or family history. This may change how we assess her cancer risk, screening, and the testing we would offer.     Plan:   1) I will send Milagro a copy of the new test report that reflects the change in classification.  2) If there are any further questions or concerns, she should not hesitate to contact me via [employer].     Hoarce Quiles MS, [facility]  Licensed, Certified Genetic Counselor

## 2025-04-23 NOTE — PROGRESS NOTES
4/23/2025    Referring Provider: Maribel Short MD     Presenting Information:  Milagro was previously seen in the Cancer Risk Management Program due to the family history of cancer. A Custom Cancer panel was ordered from [facility]. At that time, Milagro was found to have a variant of unknown significance (VUS) in the MSH2 and MUTYH genes. This variant in the MSH2 gene is called c.260C>G (p.Oyr29Vpm) and the variant in the MUTYH gene is called c.925C>T (p.Mcf653Xbk). No clinically significant variants were detected at that time in the 51 genes tested: APC, DUC, AXIN2, BAP1, BARD1, BMPR1A, BRCA1, BRCA2, BRIP1, CDH1, CDK4, CDKN2A, CHEK2, CTNNA1, DICER1, EPCAM, GREM1, HOXB13, KIT, MEN1, MITF, MLH1, MSH2, MSH3, MSH6, MUTYH, NBN, NF1, NTHL1, PALB2, PDGFRA, PMS2, POLD1, POLE, POT1, PTEN, RAD50, RAD51C, RAD51D, RB1, SDHA, SDHB, SDHC, SDHD, SMAD4, SMARCA4, STK11, TP53, TSC1, TSC2, and VHL.    Result Reclassification:  [facility]. The VUS in the MSH2 gene has been reclassified as Likely Benign. This means that the MSH2 variant found in Milagro is most likely NOT associated with Zhou syndrome nor an increased risk for cancer.      Of note, the variant in the MUTYH gene is still unchanged at this time.    Screening Recommendations:  Milagro should continue with cancer screening based on personal medical and family history, as previously discussed.    Summary:  We do not have an explanation for Milagro's family history of cancer. While no genetic changes were identified, Milagro may still be at risk for certain cancers due to family history, environmental factors, or other genetic causes not identified by this test. Because of that, it is important that she continue with cancer screening based on her personal and family history as previously discussed.    Genetic testing is rapidly advancing, and new cancer susceptibility genes will most likely be identified in the future. Therefore, I  encouraged Milagro to contact me annually or if there are changes in her personal or family history. This may change how we assess her cancer risk, screening, and the testing we would offer.    Plan:   1) I will send Milagro a copy of the new test report that reflects the change in classification.  2) If there are any further questions or concerns, she should not hesitate to contact me via [employer].    Horace Quiles MS, [facility]  Licensed, Certified Genetic Counselor

## 2025-05-09 ENCOUNTER — ANCILLARY PROCEDURE (OUTPATIENT)
Dept: MAMMOGRAPHY | Facility: CLINIC | Age: 55
End: 2025-05-09
Attending: FAMILY MEDICINE
Payer: COMMERCIAL

## 2025-05-09 DIAGNOSIS — Z12.31 VISIT FOR SCREENING MAMMOGRAM: ICD-10-CM

## 2025-05-09 PROCEDURE — 77067 SCR MAMMO BI INCL CAD: CPT | Mod: TC | Performed by: RADIOLOGY

## 2025-05-09 PROCEDURE — 77063 BREAST TOMOSYNTHESIS BI: CPT | Mod: TC | Performed by: RADIOLOGY

## 2025-05-16 NOTE — TELEPHONE ENCOUNTER
Pt was discharged from [facility] on 1/20 after being treated for Chest Pain, Unspecified Type. Pelase call the pt. Thank you.  Florinda Uriostegui,      [facility]

## 2025-06-26 ENCOUNTER — ANCILLARY PROCEDURE (OUTPATIENT)
Dept: BONE DENSITY | Facility: CLINIC | Age: 55
End: 2025-06-26
Attending: FAMILY MEDICINE
Payer: COMMERCIAL

## 2025-06-26 DIAGNOSIS — N95.1 SYMPTOMATIC MENOPAUSAL OR FEMALE CLIMACTERIC STATES: ICD-10-CM

## 2025-06-26 PROCEDURE — 77080 DXA BONE DENSITY AXIAL: CPT | Mod: TC | Performed by: PHYSICIAN ASSISTANT

## 2025-06-30 ENCOUNTER — TRANSFERRED RECORDS (OUTPATIENT)
Dept: HEALTH INFORMATION MANAGEMENT | Facility: CLINIC | Age: 55
End: 2025-06-30
Payer: COMMERCIAL

## 2025-07-15 ENCOUNTER — TRANSFERRED RECORDS (OUTPATIENT)
Dept: HEALTH INFORMATION MANAGEMENT | Facility: CLINIC | Age: 55
End: 2025-07-15
Payer: COMMERCIAL

## 2025-07-15 LAB
ALT SERPL-CCNC: 16 IU/L (ref 6–32)
AST SERPL-CCNC: 18 U/L (ref 10–35)
CREATININE (EXTERNAL): 0.9 MG/DL (ref 0.5–1.05)

## 2025-07-21 ENCOUNTER — RESULTS FOLLOW-UP (OUTPATIENT)
Dept: FAMILY MEDICINE | Facility: CLINIC | Age: 55
End: 2025-07-21

## 2025-07-21 DIAGNOSIS — H81.12 BPPV (BENIGN PAROXYSMAL POSITIONAL VERTIGO), LEFT: ICD-10-CM

## 2025-08-20 ENCOUNTER — TRANSFERRED RECORDS (OUTPATIENT)
Dept: HEALTH INFORMATION MANAGEMENT | Facility: CLINIC | Age: 55
End: 2025-08-20
Payer: COMMERCIAL

## 2025-08-25 DIAGNOSIS — H81.12 BPPV (BENIGN PAROXYSMAL POSITIONAL VERTIGO), LEFT: ICD-10-CM

## 2025-08-25 RX ORDER — ONDANSETRON 8 MG/1
8 TABLET, ORALLY DISINTEGRATING ORAL EVERY 8 HOURS PRN
Qty: 20 TABLET | Refills: 6 | Status: SHIPPED | OUTPATIENT
Start: 2025-08-25

## 2025-09-01 RX ORDER — ONDANSETRON 8 MG/1
8 TABLET, ORALLY DISINTEGRATING ORAL EVERY 8 HOURS PRN
Qty: 20 TABLET | Refills: 5 | Status: SHIPPED | OUTPATIENT
Start: 2025-09-01

## (undated) RX ORDER — DOBUTAMINE HYDROCHLORIDE 200 MG/100ML
INJECTION INTRAVENOUS
Status: DISPENSED
Start: 2020-01-20

## (undated) RX ORDER — ATROPINE SULFATE 0.4 MG/ML
AMPUL (ML) INJECTION
Status: DISPENSED
Start: 2020-01-20

## (undated) RX ORDER — METOPROLOL TARTRATE 1 MG/ML
INJECTION, SOLUTION INTRAVENOUS
Status: DISPENSED
Start: 2020-01-20